# Patient Record
Sex: MALE | Race: ASIAN | Employment: FULL TIME | URBAN - METROPOLITAN AREA
[De-identification: names, ages, dates, MRNs, and addresses within clinical notes are randomized per-mention and may not be internally consistent; named-entity substitution may affect disease eponyms.]

---

## 2017-05-23 ENCOUNTER — OFFICE VISIT (OUTPATIENT)
Dept: HEMATOLOGY | Age: 67
End: 2017-05-23

## 2017-05-23 VITALS
WEIGHT: 127.4 LBS | TEMPERATURE: 97.5 F | DIASTOLIC BLOOD PRESSURE: 77 MMHG | BODY MASS INDEX: 24.07 KG/M2 | SYSTOLIC BLOOD PRESSURE: 128 MMHG | HEART RATE: 61 BPM

## 2017-05-23 DIAGNOSIS — K75.4 AUTOIMMUNE HEPATITIS (HCC): Primary | ICD-10-CM

## 2017-05-23 DIAGNOSIS — K86.1 AUTOIMMUNE SCLEROSING PANCREATITIS (HCC): ICD-10-CM

## 2017-05-23 NOTE — MR AVS SNAPSHOT
Visit Information Date & Time Provider Department Dept. Phone Encounter #  
 5/23/2017  8:30 AM Trena Regalado NP [facility] [address] [place] [address] [id number] Follow-up Instructions Return in about 6 months (around 11/23/2017) for FibroScan. Upcoming Health Maintenance Date Due Hepatitis C Screening 1950 DTaP/Tdap/Td series (1 - Tdap) 2/20/1972 FOBT Q 1 YEAR AGE 50-75 2/20/2001 ZOSTER VACCINE AGE 60> 2/20/2011 GLAUCOMA SCREENING Q2Y 2/20/2016 Pneumococcal 65+ Low/Medium Risk (1 of 2 - PCV13) 2/20/2016 MEDICARE YEARLY EXAM 2/20/2016 INFLUENZA AGE 9 TO ADULT 8/1/2017 Allergies as of 5/23/2017  Review Complete On: 5/23/2017 By: Trena Regalado NP No Known Allergies Current Immunizations  Never Reviewed No immunizations on file. Not reviewed this visit You Were Diagnosed With   
  
 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  
  
   
This list is accurate as of: 5/23/17  9:01 AM.  Always use your most recent med list.  
  
  
  
  
 tacrolimus 1 mg capsule Commonly known as:  PROGRAF Take 1 Cap by mouth every twelve (12) hours. 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 To-Do List   
 11/29/2017 10:00 AM  
  Appointment with Trena Escamilla NP at [facility] ([phone]) Patient Instructions FIBROSCAN PATIENT INFORMATION What is Fibroscan:? 
 
Fibroscan is an ultrasound device that measures liver stiffness by sending a pulse of vibrations through the liver. This translated into an immediate result that can help your healthcare team determine the level of damage to the liver as well as monitor the condition of various liver diseases over time. Fibroscan is helpful in the evaluation of the following conditions: 
 
Chronic Hepatitis C Chronic Hepatitis B Fatty Liver Disease Alcohol Liver Disease Chronic Cholestatic Liver Diseases What happens During the Scan? Patients receiving this exam lie flat on an examination table and raise the right arm above the head. The skin over the right lower rib cage is exposed and the examiner locates the correct area to be scanned. The prove of the scanner is placed directly on the patient and triggered to start. This fells like a gentle flick against the skin and should not be uncomfortable. At least ten (10) readings are taken and the average is calculated to score the amount of liver stiffness or scar tissue. The exam should take 10-20 minutes. What do I need to do to prepare for the scan? Please do not eat or drink anything 2-4 hours  Before your Fibroscan. You should continue taking any prescribed medication and can take small sips of water or clear fluid to do so,  But avoid drinking large amounts of fluid. Please dress comfortably in clothes that will allow for easy access to the right side of the abdomen. Women are discouraged from wearing a dress on the day of the exam. 
 
Are there any special precautions?  
 
Patients who are pregnant or have an implantable device (for example, pacemaker or defibrillator) should not have this exam performed. Patients with a significant amount of fat tissue in the area the probe is pressed may be unable to have test performed. Introducing hospitals & [facility] SERVICES! Dear Maximilian Caballero: Thank you for requesting a AisleFinder account. Our records indicate that you have previously registered for a AisleFinder account but its currently inactive. Please call our AisleFinder support line at [phone]. Additional Information If you have questions, please visit the Frequently Asked Questions section of the AisleFinder website at [email or web address] Remember, AisleFinder is NOT to be used for urgent needs. For medical emergencies, dial 911. Now available from your iPhone and Android! Please provide this summary of care documentation to your next provider. Your primary care clinician is listed as Jojo Huber. If you have any questions after today's visit, please call [phone].

## 2017-05-24 NOTE — PROGRESS NOTES
[address], MD, [facility]     April Sarita Payton, NP Selma Manges, PA-C Norlene Leyden, MD, [place], MD Ellen Moore NP Larene Mccallum, NP        [address] [facility], [address] [address]  22.     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 The active problem list, all pertinent past medical history, medications, endoscopic studies, radiologic findings and laboratory findings related to the liver disorder were reviewed with the patient. The patient also has autoimmune pancreatitis. Both autoimmune diseases were treated with prednisone and Imuran. However, whenever the prednisone dose is lowered he relapse of both the hepatitis and pancreatitis. He was started on tacrolimus in 1/2016. He was instructed to discontinue prednisone and Imuran. He is tolerating tacrolimus well. His liver enzymes and function have both remained normal with his current dose. MRI of the liver was performed in 11/2015. There was a stricture of the common bile duct. Dr. Mirna Goode treated this with a stent but it did not remain open so he placed a metal stent in the PD in 4/2016. The patient feels well today and has no new physical complaints. He continues to complain of increased gas. Probiotic improves his symptoms.  The patient has no limitations in functional activities which can be attributed to the liver disease. Today, patient denies abdominal pain, change in bowel habits, dark urine, myalgias, arthralgias, pruritus and problems concentrating. ALLERGIES:  No Known Allergies    MEDICATIONS  Current Outpatient Prescriptions   Medication Sig    tacrolimus (PROGRAF) 1 mg capsule Take 1 Cap by mouth every twelve (12) hours. 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 SYSTEM REVIEW NOT RELATED TO LIVER DISEASE OR REVIEWED ABOVE:  Constitution systems: Negative for fever, chills, weight gain, weight loss. Eyes: Negative for visual changes. ENT: Negative for sore throat, painful swallowing. Respiratory: Negative for cough, hemoptysis, SOB. Cardiology: Negative for chest pain, palpitations. GI:  Negative for constipation or diarrhea. : Negative for urinary frequency, dysuria, hematuria, nocturia. Skin: Negative for rash. Hematology: Negative for easy bruising, blood clots. Musculo-skelatal: Negative for back pain, muscle pain, weakness. Neurologic: Negative for headaches, dizziness, vertigo, memory problems not related to HE. Psychology: Negative for anxiety, depression. FAMILY HISTORY:  The father  of colon cancer. The mother has the following chronic diseases: has an autoimmune disease. There is no family history of liver disease. There is a family history of autoimmune diseases. SOCIAL HISTORY:  The patient is . The patient has 2 children. The patient has never used tobacco products. The patient has never consumed significant amounts of alcohol. The patient currently works full time in the [employer]. PHYSICAL EXAMINATION:  Visit Vitals    /77    Pulse 61    Temp 97.5 °F (36.4 °C) (Tympanic)    Wt 127 lb 6.4 oz (57.8 kg)    BMI 24.07 kg/m2     General: No acute distress. Eyes: Sclera anicteric. ENT: No oral lesions. Thyroid normal.  Nodes: No adenopathy. Skin: No spider angiomata. No jaundice. No palmar erythema. Respiratory: Lungs clear to auscultation. Cardiovascular: Regular heart rate. No murmurs. No JVD. Abdomen: Soft non-tender. Liver size normal to percussion/palpation. Spleen not palpable. No obvious ascites. Extremities: No edema. No muscle wasting. No gross arthritic changes. Neurologic: Alert and oriented. Cranial nerves grossly intact. No asterixsis. LABORATORY STUDIES:  5/2017: Local Labs  WBC/HGB/PLT: 6.3/12.4/265  AST/ALT/ALP/BILI/ALB: 19/14/87/0.3/4.5  BUN/CR: 23/0.91    [facility] Ref Rng & Units 11/29/2016 8/9/2016 4/29/2016   WBC 3.4 - 10.8 x10E3/uL  6.5 7.5   ANC x10E3/uL  3.6 4.1   HGB g/dL  11.8 14.1    - 379 x10E3/uL  249 268   INR 0.8 - 1.2      AST 0 - 40 IU/L 22 18 21   ALT 0 - 44 IU/L 24 15 18   Alk Phos 39 - 117 IU/L 99 100 106   Bili, Total 0.0 - 1.2 mg/dL 0.4 0.4 0.5   Bili, Direct 0.00 - 0.40 mg/dL      Albumin 3.6 - 4.8 g/dL 4.4 4.2 4.4   BUN 8 - 27 mg/dL 22 19 17   Creat 0.76 - 1.27 mg/dL 1.08 0.86 0.93   Na 136 - 144 mmol/L 139 142 139   K 3.5 - 5.2 mmol/L 5.0 4.9 4.9   Cl 97 - 106 mmol/L 100 105 100   CO2 18 - 29 mmol/L 24 24 23   Glucose 65 - 99 mg/dL 148 (H) 99 113 (H)     Liver Virology and Transplant Immune Suppression Tacrolimus Level   Latest Ref Rng 2.0 - 20.0 ng/mL   11/29/2016 8.3   8/9/2016 9.9   4/29/2016 7.0   3/25/2016 21.4 (HH)     SEROLOGIES:  10/2013. HBsAntigen negative, anti-HBcore positive, anti-HBsurface positive, HBE antigen negative, Anti-HBE positive,HBV DNA undetectable, Ferritin 797,     Serologies Latest Ref Rng 6/3/2014   APRIL, IFA  See patterns   APRIL Pattern  1:160 (H)   C-ANCA Neg:<1:20 titer <1:20   P-ANCA Neg:<1:20 titer <1:20   ANCA Neg:<1:20 titer <1:20   ASMCA 0 - 19 Units 25 (H)   M2 Ab 0.0 - 20.0 Units 3.8     LIVER HISTOLOGY:  2013. Reviewed by MLS. Moderate inflammation with stage 3 fibrosis. 8/2016. FibroScan performed at [facility]. EkPa was 7.4. Suggested fibrosis level is F1.    ENDOSCOPIC PROCEDURES:  2013. EGD by Dr Parminder Gibson. Gastritis and small gastric ulcer. 2013. EGD by Dr Parminder Gibson. Healed gastritis and .  2015. ERCP. Common bile duct stricture. Stent placed. 2016. ERCP by Dr Rosi Walden. Pancreatic stricture. Covered metal stent placed. RADIOLOGY:  10/2013. MRI of liver. Diffuse swelling of pancreas with peripancreatic edema. Encasement of CDB by edematous pancreas head. Normal appearing liver. No liver mass lesions. Normal spleen. No dilated bile ducts. No bile duct strictures. No ascites. 2014. MRI of liver. Short yet severe stricture of the common duct within the pancreatic head without associated pancreatic ductal or intrahepatic biliary dilatation. There is questionable subtle decreased contrast enhancement within the pancreatic head. ERCP with endoscopic ultrasound suggested for further evaluation. 2015. MRCP. Obstruction of CBD in region of pennie hepatitis. Distal CBD is normal. Proximal to obstruction the intrahepatic bile ducts are dilated. OTHER TESTIN2013.  17. ASSESSMENT AND PLAN:  Autoimmune hepatitis with bridging fibrosis in . FibroScan in 2016 demonstrated improvement in his liver disease after treatment with a fibrosis score of stage 1 out of 4. He will continue his current regimen. This has been effective and is well tolerated. Discussed with patient today the importance of continuing this regimen long-term. He has had no abdominal pain and exacerbation of autoimmune pancreatitis since starting tacrolimus in 2016. Will obtain blood work a few days prior to the next appointment in 6 months. This will include tacrolimus blood level (patient instructed to hold tacrolimus until after lab work). This was given to patient today. The patient was directed to continue all current medications at the current dosages.  There are no contraindications for the patient to take any medications that are necessary for treatment of other medical issues. All of the above issues were discussed with the patient. All questions were answered. The patient expressed a clear understanding of the above. [facility] [place] [address] in 6 months with FibroScan.     Veleta Eisenmenger, NP  Liver [place] [address]  Ph: [phone]  Fax: [phone]  Email: [email or web address]

## 2017-10-11 ENCOUNTER — HOSPITAL ENCOUNTER (OUTPATIENT)
Age: 67
Setting detail: OUTPATIENT SURGERY
Discharge: HOME | End: 2017-10-11
Attending: INTERNAL MEDICINE | Admitting: INTERNAL MEDICINE
Payer: COMMERCIAL

## 2017-10-11 ENCOUNTER — ANESTHESIA EVENT (OUTPATIENT)
Dept: ENDOSCOPY | Age: 67
End: 2017-10-11
Payer: COMMERCIAL

## 2017-10-11 ENCOUNTER — ANESTHESIA (OUTPATIENT)
Dept: ENDOSCOPY | Age: 67
End: 2017-10-11
Payer: COMMERCIAL

## 2017-10-11 ENCOUNTER — APPOINTMENT (OUTPATIENT)
Dept: GENERAL RADIOLOGY | Age: 67
End: 2017-10-11
Attending: INTERNAL MEDICINE
Payer: COMMERCIAL

## 2017-10-11 VITALS
SYSTOLIC BLOOD PRESSURE: 113 MMHG | DIASTOLIC BLOOD PRESSURE: 81 MMHG | HEART RATE: 59 BPM | WEIGHT: 126.5 LBS | RESPIRATION RATE: 18 BRPM | OXYGEN SATURATION: 100 % | TEMPERATURE: 97.7 F | BODY MASS INDEX: 23.88 KG/M2 | HEIGHT: 61 IN

## 2017-10-11 LAB — CEA SERPL-MCNC: 0.5 NG/ML

## 2017-10-11 PROCEDURE — 76040000007: Performed by: INTERNAL MEDICINE

## 2017-10-11 PROCEDURE — 77030007288 HC DEV LOK BILI BSC -A: Performed by: INTERNAL MEDICINE

## 2017-10-11 PROCEDURE — 77030009038 HC CATH BILI STN RTVR BSC -C: Performed by: INTERNAL MEDICINE

## 2017-10-11 PROCEDURE — 77030012596 HC SPHNTOM BILI BSC -E: Performed by: INTERNAL MEDICINE

## 2017-10-11 PROCEDURE — 88305 TISSUE EXAM BY PATHOLOGIST: CPT | Performed by: INTERNAL MEDICINE

## 2017-10-11 PROCEDURE — 88112 CYTOPATH CELL ENHANCE TECH: CPT | Performed by: INTERNAL MEDICINE

## 2017-10-11 PROCEDURE — 86301 IMMUNOASSAY TUMOR CA 19-9: CPT | Performed by: INTERNAL MEDICINE

## 2017-10-11 PROCEDURE — C2625 STENT, NON-COR, TEM W/DEL SY: HCPCS | Performed by: INTERNAL MEDICINE

## 2017-10-11 PROCEDURE — 82378 CARCINOEMBRYONIC ANTIGEN: CPT | Performed by: INTERNAL MEDICINE

## 2017-10-11 PROCEDURE — 74011250636 HC RX REV CODE- 250/636

## 2017-10-11 PROCEDURE — 36415 COLL VENOUS BLD VENIPUNCTURE: CPT | Performed by: INTERNAL MEDICINE

## 2017-10-11 PROCEDURE — 77030009426 HC FCPS BIOP ENDOSC BSC -B: Performed by: INTERNAL MEDICINE

## 2017-10-11 PROCEDURE — 77030036933 HC BRSH RX CYTO WREGD BSC -C: Performed by: INTERNAL MEDICINE

## 2017-10-11 PROCEDURE — 74011000250 HC RX REV CODE- 250

## 2017-10-11 PROCEDURE — 76060000032 HC ANESTHESIA 0.5 TO 1 HR: Performed by: INTERNAL MEDICINE

## 2017-10-11 DEVICE — BILIARY STENT WITH NAVIFLEXTM RX DELIVERY SYSTEM
Type: IMPLANTABLE DEVICE | Site: BILE DUCT | Status: FUNCTIONAL
Brand: ADVANIX™ BILIARY

## 2017-10-11 RX ORDER — SODIUM CHLORIDE 0.9 % (FLUSH) 0.9 %
5-10 SYRINGE (ML) INJECTION AS NEEDED
Status: DISCONTINUED | OUTPATIENT
Start: 2017-10-11 | End: 2017-10-11 | Stop reason: HOSPADM

## 2017-10-11 RX ORDER — LIDOCAINE HYDROCHLORIDE 20 MG/ML
INJECTION, SOLUTION EPIDURAL; INFILTRATION; INTRACAUDAL; PERINEURAL AS NEEDED
Status: DISCONTINUED | OUTPATIENT
Start: 2017-10-11 | End: 2017-10-11 | Stop reason: HOSPADM

## 2017-10-11 RX ORDER — FLUMAZENIL 0.1 MG/ML
0.2 INJECTION INTRAVENOUS
Status: DISCONTINUED | OUTPATIENT
Start: 2017-10-11 | End: 2017-10-11 | Stop reason: HOSPADM

## 2017-10-11 RX ORDER — SODIUM CHLORIDE 9 MG/ML
50 INJECTION, SOLUTION INTRAVENOUS CONTINUOUS
Status: DISCONTINUED | OUTPATIENT
Start: 2017-10-11 | End: 2017-10-11 | Stop reason: HOSPADM

## 2017-10-11 RX ORDER — DEXTROMETHORPHAN/PSEUDOEPHED 2.5-7.5/.8
1.2 DROPS ORAL
Status: DISCONTINUED | OUTPATIENT
Start: 2017-10-11 | End: 2017-10-11 | Stop reason: HOSPADM

## 2017-10-11 RX ORDER — ATROPINE SULFATE 0.1 MG/ML
0.5 INJECTION INTRAVENOUS
Status: DISCONTINUED | OUTPATIENT
Start: 2017-10-11 | End: 2017-10-11 | Stop reason: HOSPADM

## 2017-10-11 RX ORDER — SODIUM CHLORIDE 0.9 % (FLUSH) 0.9 %
5-10 SYRINGE (ML) INJECTION EVERY 8 HOURS
Status: DISCONTINUED | OUTPATIENT
Start: 2017-10-11 | End: 2017-10-11 | Stop reason: HOSPADM

## 2017-10-11 RX ORDER — EPINEPHRINE 0.1 MG/ML
1 INJECTION INTRACARDIAC; INTRAVENOUS
Status: DISCONTINUED | OUTPATIENT
Start: 2017-10-11 | End: 2017-10-11 | Stop reason: HOSPADM

## 2017-10-11 RX ORDER — NALOXONE HYDROCHLORIDE 0.4 MG/ML
0.4 INJECTION, SOLUTION INTRAMUSCULAR; INTRAVENOUS; SUBCUTANEOUS
Status: DISCONTINUED | OUTPATIENT
Start: 2017-10-11 | End: 2017-10-11 | Stop reason: HOSPADM

## 2017-10-11 RX ORDER — MIDAZOLAM HYDROCHLORIDE 1 MG/ML
.25-1 INJECTION, SOLUTION INTRAMUSCULAR; INTRAVENOUS
Status: DISCONTINUED | OUTPATIENT
Start: 2017-10-11 | End: 2017-10-11 | Stop reason: HOSPADM

## 2017-10-11 RX ORDER — PROPOFOL 10 MG/ML
INJECTION, EMULSION INTRAVENOUS AS NEEDED
Status: DISCONTINUED | OUTPATIENT
Start: 2017-10-11 | End: 2017-10-11 | Stop reason: HOSPADM

## 2017-10-11 RX ORDER — SODIUM CHLORIDE 9 MG/ML
INJECTION, SOLUTION INTRAVENOUS
Status: DISCONTINUED | OUTPATIENT
Start: 2017-10-11 | End: 2017-10-11 | Stop reason: HOSPADM

## 2017-10-11 RX ORDER — FENTANYL CITRATE 50 UG/ML
100 INJECTION, SOLUTION INTRAMUSCULAR; INTRAVENOUS
Status: DISCONTINUED | OUTPATIENT
Start: 2017-10-11 | End: 2017-10-11 | Stop reason: HOSPADM

## 2017-10-11 RX ADMIN — PROPOFOL 25 MG: 10 INJECTION, EMULSION INTRAVENOUS at 12:56

## 2017-10-11 RX ADMIN — PROPOFOL 25 MG: 10 INJECTION, EMULSION INTRAVENOUS at 13:00

## 2017-10-11 RX ADMIN — PROPOFOL 25 MG: 10 INJECTION, EMULSION INTRAVENOUS at 13:14

## 2017-10-11 RX ADMIN — LIDOCAINE HYDROCHLORIDE 60 MG: 20 INJECTION, SOLUTION EPIDURAL; INFILTRATION; INTRACAUDAL; PERINEURAL at 12:51

## 2017-10-11 RX ADMIN — PROPOFOL 25 MG: 10 INJECTION, EMULSION INTRAVENOUS at 13:04

## 2017-10-11 RX ADMIN — PROPOFOL 25 MG: 10 INJECTION, EMULSION INTRAVENOUS at 13:08

## 2017-10-11 RX ADMIN — PROPOFOL 25 MG: 10 INJECTION, EMULSION INTRAVENOUS at 13:11

## 2017-10-11 RX ADMIN — PROPOFOL 50 MG: 10 INJECTION, EMULSION INTRAVENOUS at 12:51

## 2017-10-11 RX ADMIN — PROPOFOL 25 MG: 10 INJECTION, EMULSION INTRAVENOUS at 13:02

## 2017-10-11 RX ADMIN — PROPOFOL 25 MG: 10 INJECTION, EMULSION INTRAVENOUS at 12:54

## 2017-10-11 RX ADMIN — PROPOFOL 25 MG: 10 INJECTION, EMULSION INTRAVENOUS at 13:16

## 2017-10-11 RX ADMIN — PROPOFOL 25 MG: 10 INJECTION, EMULSION INTRAVENOUS at 13:09

## 2017-10-11 RX ADMIN — PROPOFOL 25 MG: 10 INJECTION, EMULSION INTRAVENOUS at 13:05

## 2017-10-11 RX ADMIN — PROPOFOL 25 MG: 10 INJECTION, EMULSION INTRAVENOUS at 13:07

## 2017-10-11 RX ADMIN — PROPOFOL 25 MG: 10 INJECTION, EMULSION INTRAVENOUS at 12:58

## 2017-10-11 RX ADMIN — PROPOFOL 25 MG: 10 INJECTION, EMULSION INTRAVENOUS at 12:52

## 2017-10-11 RX ADMIN — SODIUM CHLORIDE: 9 INJECTION, SOLUTION INTRAVENOUS at 12:40

## 2017-10-11 NOTE — ROUTINE PROCESS
Kranthi Johnson  1950  [id number]    Situation:  Verbal report received from: Yvonne Cisneros RN  Procedure: Procedure(s):  ENDOSCOPIC RETROGRADE CHOLANGIOPANCREATOGRAPHY  ENDOSCOPIC STONE EXTRACTION/BALLOON SWEEP  ESOPHAGOGASTRODUODENAL (EGD) BIOPSY  ENDOSCOPIC BRUSHING    Background:    Preoperative diagnosis: AUTOIMMUNE CHRONIC PANCREATITIS, COMMOM BILE DUCT CALCULUS, BILE DUCT STRICTURE  Postoperative diagnosis: 1. Bile Duct Stricture  2. Bile Duct Stone    :  Dr. Macie Souza  Assistant(s): Endoscopy Technician-1: Maldonado Rae  Endoscopy RN-1: Milton Ortega RN    Specimens: * No specimens in log *  H. Pylori  no    Assessment:  Intra-procedure medications   Anesthesia gave intra-procedure sedation and medications, see anesthesia flow sheet yes    Intravenous fluids: NS@ KVO     Vital signs stable     Abdominal assessment: round and soft     Recommendation:  Discharge patient per MD order.   Son  Permission to share finding with family or friend yes

## 2017-10-11 NOTE — H&P
118 [facility].  217 [facility] 140 [facility], [address]  [phone]                                History and Physical     NAME: Sera Hooper   :  1950   MRN:  [id number]     HPI:  The patient was seen and examined. Past Surgical History:   Procedure Laterality Date    ABDOMEN SURGERY PROC UNLISTED      hernia repair    HX OTHER SURGICAL      Surgury on testicles     Past Medical History:   Diagnosis Date    Liver disease     cholangitis     Social History   Substance Use Topics    Smoking status: Never Smoker    Smokeless tobacco: Never Used    Alcohol use No     No Known Allergies  Family History   Problem Relation Age of Onset    Cancer Mother     Cancer Sister     Cancer Brother      No current facility-administered medications for this encounter. PHYSICAL EXAM:  General: WD, WN. Alert, cooperative, no acute distress    HEENT: NC, Atraumatic. PERRLA, EOMI. Anicteric sclerae. Lungs:  CTA Bilaterally. No Wheezing/Rhonchi/Rales. Heart:  Regular  rhythm,  No murmur, No Rubs, No Gallops  Abdomen: Soft, Non distended, Non tender.  +Bowel sounds, no HSM  Extremities: No c/c/e  Neurologic:  CN 2-12 gi, Alert and oriented X 3. No acute neurological distress   Psych:   Good insight. Not anxious nor agitated. The heart, lungs and mental status were satisfactory for the administration of GA and for the procedure.       Mallampati score: 2       Assessment:   · Bile duct stricture    Plan:   · Endoscopic procedure  · GA

## 2017-10-11 NOTE — PROGRESS NOTES
Discharge instructions given verbally and in writing to patients son who gave a verbal understanding. Unable to sign the note because the signature pad is not working.

## 2017-10-11 NOTE — DISCHARGE INSTRUCTIONS
118 [facility].  217 [facility] [address] [facility], [address]  [phone]                     DISCHARGE INSTRUCTIONS    Manny Reynoso  [id number]  1950    DISCOMFORT:  Sore throat- throat lozenges or warm salt water gargle  redness at IV site- apply warm compress to area; if redness or soreness persist- contact your physician  Gaseous discomfort- walking, belching will help relieve any discomfort  You may not operate a vehicle for 12 hours  You may not engage in an occupation involving machinery or appliances for rest of today  You may not drink alcoholic beverages for at least 12 hours  Avoid making any critical decisions for at least 24 hour  DIET  You may eat and drink after you leave. You may resume your regular diet - however -  remember your colon is empty and a heavy meal will produce gas. Avoid these foods:  vegetables, fried / greasy foods, carbonated drinks    ACTIVITY  You may resume your normal daily activities   Spend the remainder of the day resting -  avoid any strenuous activity. CALL M.D. ANY SIGN OF   Increasing pain, nausea, vomiting  Abdominal distension (swelling)  New increased bleeding (oral or rectal)  Fever (chills)  Pain in chest area  Bloody discharge from nose or mouth  Shortness of breath    Follow-up Instructions:   Call Dr. Maureen Gardner for any questions or problems. If we took a biopsy please call the office within 2 weeks to discuss your                             pathology results. Telephone # [phone]        Continue same medications.

## 2017-10-11 NOTE — PROGRESS NOTES

## 2017-10-11 NOTE — PROGRESS NOTES
[address] October 11, 2017       RE: Gordon Hollins      To Whom It May Concern,    This is to certify that Gordon Hollins may return to work on 10/17/2017. Please excuse Mr. Guillermo Short from work on 10/11/17-10/16/17 due to medical reasons. Please feel free to contact my office if you have any questions or concerns. Thank you for your assistance in this matter.       Sincerely,  Dr. Pam Harvey   [phone]

## 2017-10-11 NOTE — IP AVS SNAPSHOT
[address] 
[phone] Patient: Farrukh Keller MRN: [id number] SDC:8/96/6690 You are allergic to the following No active allergies Recent Documentation Height Weight BMI Smoking Status 1.549 m 57.4 kg 23.9 kg/m2 Never Smoker Emergency Contacts Name Discharge Info Relation Home Work Mobile Giorgio Trevizo DISCHARGE CAREGIVER [3] Child [2] [phone] Melissa Mathis CAREGIVER [3] Spouse [3] [phone] About your hospitalization You were admitted on:  October 11, 2017 You last received care in the:  [facility] ENDOSCOPY You were discharged on:  October 11, 2017 Unit phone number:  [phone] Why you were hospitalized Your primary diagnosis was:  Not on File Providers Seen During Your Hospitalizations Provider Role Specialty Primary office phone Octavio Candelario MD Attending Provider Gastroenterology [phone] Your Primary Care Physician (PCP) Primary Care Physician Office Phone Office Fax Awilda Dwyer [phone] Follow-up Information None Current Discharge Medication List  
  
ASK your doctor about these medications Dose & Instructions Dispensing Information Comments Morning Noon Evening Bedtime  
 tacrolimus 1 mg capsule Commonly known as:  PROGRAF Your last dose was: Your next dose is:    
   
   
 Dose:  1 mg Take 1 Cap by mouth every twelve (12) hours. 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 
[address]  
[phone] DISCHARGE INSTRUCTIONS Farrukh Keller 
[id number] 
1950 DISCOMFORT: 
 Sore throat- throat lozenges or warm salt water gargle 
redness at IV site- apply warm compress to area; if redness or soreness persist- contact your physician Gaseous discomfort- walking, belching will help relieve any discomfort You may not operate a vehicle for 12 hours You may not engage in an occupation involving machinery or appliances for rest of today You may not drink alcoholic beverages for at least 12 hours Avoid making any critical decisions for at least 24 hour DIET You may eat and drink after you leave. You may resume your regular diet  however -  remember your colon is empty and a heavy meal will produce gas. Avoid these foods:  vegetables, fried / greasy foods, carbonated drinks ACTIVITY You may resume your normal daily activities Spend the remainder of the day resting -  avoid any strenuous activity. CALL M.D. ANY SIGN OF Increasing pain, nausea, vomiting Abdominal distension (swelling) New increased bleeding (oral or rectal) Fever (chills) Pain in chest area Bloody discharge from nose or mouth Shortness of breath Follow-up Instructions: 
 Call Dr. Sho Moreno for any questions or problems. If we took a biopsy please call the office within 2 weeks to discuss your                             pathology results. Telephone # [phone] Continue same medications. Discharge Orders None Introducing [facility] & HEALTH SERVICES! Dear Tressa Obregon: Thank you for requesting a Fangdd account. Our records indicate that you have previously registered for a Fangdd account but its currently inactive. Please call our Fangdd support line at [phone]. Additional Information If you have questions, please visit the Frequently Asked Questions section of the Fangdd website at [email or web address] Remember, Fangdd is NOT to be used for urgent needs. For medical emergencies, dial 911. Now available from your iPhone and Android! General Information Please provide this summary of care documentation to your next provider. Patient Signature:  ____________________________________________________________ Date:  ____________________________________________________________  
  
Marvetta Land Provider Signature:  ____________________________________________________________ Date:  ____________________________________________________________

## 2017-10-11 NOTE — ANESTHESIA POSTPROCEDURE EVALUATION
Post-Anesthesia Evaluation and Assessment    Patient: Farrukh Keller MRN: [id number]  SSN: [id number]    YOB: 1950  Age: 78 y.o. Sex: male       Cardiovascular Function/Vital Signs  Visit Vitals    /65    Pulse 70    Temp 36.8 °C (98.3 °F)    Resp 12    Ht 5' 1\" (1.549 m)    Wt 57.4 kg (126 lb 8 oz)    SpO2 98%    BMI 23.9 kg/m2       Patient is status post MAC anesthesia for Procedure(s):  ENDOSCOPIC RETROGRADE CHOLANGIOPANCREATOGRAPHY  ENDOSCOPIC STONE EXTRACTION/BALLOON SWEEP  ESOPHAGOGASTRODUODENAL (EGD) BIOPSY  ENDOSCOPIC BRUSHING. Nausea/Vomiting: None    Postoperative hydration reviewed and adequate. Pain:  Pain Scale 1: Numeric (0 - 10) (10/11/17 1129)  Pain Intensity 1: 3 (10/11/17 1103)   Managed    Neurological Status: At baseline    Mental Status and Level of Consciousness: Arousable    Pulmonary Status:   O2 Device: Nasal cannula (10/11/17 1320)   Adequate oxygenation and airway patent    Complications related to anesthesia: None    Post-anesthesia assessment completed.  No concerns    Signed By: Viviane Gitelman, MD     October 11, 2017

## 2017-10-11 NOTE — ANESTHESIA PREPROCEDURE EVALUATION
Anesthetic History   No history of anesthetic complications            Review of Systems / Medical History  Patient summary reviewed, nursing notes reviewed and pertinent labs reviewed    Pulmonary  Within defined limits                 Neuro/Psych   Within defined limits           Cardiovascular                  Exercise tolerance: >4 METS     GI/Hepatic/Renal           Liver disease    Comments: Common bile duct obstruction Endo/Other             Other Findings   Comments: Pt has an oral ulcer on right of hard palate         Physical Exam    Airway  Mallampati: I  TM Distance: > 6 cm  Neck ROM: normal range of motion   Mouth opening: Normal     Cardiovascular    Rhythm: regular  Rate: normal         Dental  No notable dental hx       Pulmonary  Breath sounds clear to auscultation               Abdominal         Other Findings            Anesthetic Plan    ASA: 2  Anesthesia type: MAC          Induction: Intravenous  Anesthetic plan and risks discussed with: Patient

## 2017-10-11 NOTE — PROCEDURES
118 [facility].  217 [facility] [address], [address]  [phone]                   ERCP NOTE    NAME:  Dana Holley   :   1950   MRN:   [id number]     Date/Time:  10/11/2017 1:32 PM    Procedure Type:   ERCPwith biliary stone removal, biliary stent placement, biliary tissue sampling     Indications: abnormal CT/MRCP  Pre-operative Diagnosis: see indication above  Post-operative Diagnosis:  See findings below  : Isrrael Soto MD    Referring Provider:     Rubi Arshad MD    Sedation:  MAC anesthesia    Procedure Details:  After informed consent was obtained with all risks and benefits of procedure explained, the patient was taken to the fluoroscopy suite and placed in the prone position. Upon sequential sedation as per above, the Olympus duodenoscope [id number]   was inserted via the mouthpeice and carefully advanced to the second portion of the duodenum. The quality of visualization was good. The duodenoscope was withdrawn into a short position. Findings:   Esophagus: not examined in detail  Stomach: not examined in detail  Duodenum/jejunum: not examined in detail  Ampulla:-normal   previous sphincterotomy  Cholangiogram: -Bile duct was cannulated using Dreamwire. Contrast was injected. A stricture was seen in the distal common bile duct immediatelyu proximal to the ampulla. This was 1cm in length. Shoulder effect was seen. An oblong filing defect was seen in the mid common bile duct. Biliary brushings and biopsy were performed. Balloon sweep with an extraction balloon 9-12 mm injected below yielded sludge and an oblong 15 mm X 6 mm stone. No stone remained. A 1-0 Fr X 7 cm plastic biliary stent (Advanix) was placed. Bile was draining well and stent was in good position. Pancreatogram:not performed    Specimen Removed: * No specimens in log *    Complications: None. EBL:  None.     Interventions:    Pancreatic: none  Biliary: Bile duct was cannulated using Dreamwire. Contrast was injected. A stricture was seen in the distal common bile duct immediatelyu proximal to the ampulla. This was 1cm in length. Shoulder effect was seen. An oblong filing defect was seen in the mid common bile duct. Biliary brushings and biopsy were performed. Balloon sweep with an extraction balloon 9-12 mm injected below yielded sludge and an oblong 15 mm X 6 mm stone. No stone remained. A 1-0 Fr X 7 cm plastic biliary stent (AdvaniDubizzle) was placed. Bile was draining well and stent was in good position.      Impression:  -Bile duct stricture and stented  -Bile duct stone- extracted    Recommendations:   -Await cytology and histology results  -CEA and CA 19-9 levels  -Consider spyglass for biliary tissue sampling based upon the results  -ERCP with stent change in 3 months unless a spyglass procedure is performed earlier based upon the biopsy results      Discharge Disposition:  Home following recovery in [facility] Aster Kovacs MD  10/11/2017  1:32 PM

## 2017-10-13 LAB — CANCER AG19-9 SERPL-ACNC: 8 U/ML (ref 0–35)

## 2017-11-29 ENCOUNTER — OFFICE VISIT (OUTPATIENT)
Dept: HEMATOLOGY | Age: 67
End: 2017-11-29

## 2017-11-29 VITALS
WEIGHT: 126.4 LBS | BODY MASS INDEX: 23.86 KG/M2 | DIASTOLIC BLOOD PRESSURE: 66 MMHG | HEIGHT: 61 IN | HEART RATE: 65 BPM | TEMPERATURE: 96.1 F | OXYGEN SATURATION: 99 % | SYSTOLIC BLOOD PRESSURE: 109 MMHG

## 2017-11-29 DIAGNOSIS — K86.1 AUTOIMMUNE SCLEROSING PANCREATITIS (HCC): ICD-10-CM

## 2017-11-29 DIAGNOSIS — K75.4 AUTOIMMUNE HEPATITIS (HCC): Primary | ICD-10-CM

## 2017-11-29 RX ORDER — TACROLIMUS 1 MG/1
1 CAPSULE ORAL EVERY 12 HOURS
Qty: 180 CAP | Refills: 3 | Status: SHIPPED | OUTPATIENT
Start: 2017-11-29 | End: 2019-01-23 | Stop reason: SDUPTHER

## 2017-11-29 NOTE — MR AVS SNAPSHOT
Visit Information Date & Time Provider Department Dept. Phone Encounter #  
 11/29/2017 10:00 AM April G Bunny Castillo, [facility] 219 [id number] Upcoming Health Maintenance Date Due Hepatitis C Screening 1950 DTaP/Tdap/Td series (1 - Tdap) 2/20/1972 FOBT Q 1 YEAR AGE 50-75 2/20/2001 ZOSTER VACCINE AGE 60> 12/20/2010 GLAUCOMA SCREENING Q2Y 2/20/2016 Pneumococcal 65+ Low/Medium Risk (1 of 2 - PCV13) 2/20/2016 MEDICARE YEARLY EXAM 2/20/2016 Influenza Age 5 to Adult 8/1/2017 Allergies as of 11/29/2017  Review Complete On: 11/29/2017 By: Jose Coe LPN No Known Allergies Current Immunizations  Never Reviewed No immunizations on file. Not reviewed this visit Vitals BP Pulse Temp Height(growth percentile) 109/66 (BP 1 Location: Left arm, BP Patient Position: Sitting) 65 96.1 °F (35.6 °C) (Tympanic) 5' 1\" (1.549 m) Weight(growth percentile) SpO2 BMI Smoking Status 126 lb 6.4 oz (57.3 kg) 99% 23.88 kg/m2 Never Smoker BMI and BSA Data Body Mass Index Body Surface Area  
 23.88 kg/m 2 1.57 m 2 Preferred Pharmacy Pharmacy Name Phone Min Alejandro [address] [facility], [address] [place] [address] [phone] Your Updated Medication List  
  
   
This list is accurate as of: 11/29/17 10:06 AM.  Always use your most recent med list.  
  
  
  
  
 CALCIUM 600 + D 600-125 mg-unit Tab Generic drug:  calcium-cholecalciferol (d3) Take  by mouth. FISH  mg Cap Generic drug:  Omega-3 Fatty Acids Take  by mouth. [address] Take  by mouth. tacrolimus 1 mg capsule Commonly known as:  PROGRAF Take 1 Cap by mouth every twelve (12) hours. 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  
 tacrolimus (PROGRAF) 1 mg capsule 3  
 Sig: Take 1 Cap by mouth every twelve (12) hours. 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  
 Pharmacy: [facility] [address]  [address] [place] [address] Cruz Camara Ph #: [phone] Route: Oral  
  
Introducing [facility] & HEALTH SERVICES! Dear Sandra Watson: Thank you for requesting a Songkick account. Our records indicate that you have previously registered for a Songkick account but its currently inactive. Please call our Songkick support line at [phone]. Additional Information If you have questions, please visit the Frequently Asked Questions section of the Songkick website at [email or web address] Remember, Songkick is NOT to be used for urgent needs. For medical emergencies, dial 911. Now available from your iPhone and Android! Please provide this summary of care documentation to your next provider. Your primary care clinician is listed as Terese Carias. If you have any questions after today's visit, please call [phone].

## 2017-11-29 NOTE — PROGRESS NOTES
[address] Marybel Kirkpatrick MD, Marcelo Wesley, [facility] [place], [place]       Sanya Hunt, MATTY Sood, [facility]   RONI Zendejas NP        at [address], [address] [address]     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 [address] K75.4        Ignacio Cheek [facility] for management of autoimmune hepatitis. The active problem list, all pertinent past medical history, medications, endoscopic studies, radiologic findings and laboratory findings related to the liver disorder were reviewed with the patient. The patient also has autoimmune pancreatitis. Both autoimmune diseases were treated with prednisone and Imuran. However, whenever the prednisone dose is lowered, both the hepatitis and pancreatitis flare. 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 Patient presents to the clinic today for a Fibroscan to reassess liver fibrosis or scarring. Fibroscan in August 2016 demonstrated portal fibrosis after successful treatment of AIH. He was started on tacrolimus in 1/2016. He was instructed to discontinue prednisone and Imuran. He is tolerating tacrolimus well. His liver enzymes and function have both remained normal with his current dose. MRI of the liver was performed in 11/2015. There was a stricture of the common bile duct. Dr. Jeannette Grullon treated this with a stent but it did not remain open so he placed a metal stent in the PD in 4/2016.  In 2017, Dr. Kobe Flores went back in and place another stent and removed a bile duct stone with no complications. The patient feels well today and has no new physical complaints. He continues to complain of increased gas. Probiotic improves his symptoms. The patient has no limitations in functional activities which can be attributed to the liver disease. Today, patient denies abdominal pain, change in bowel habits, dark urine, myalgias, arthralgias, pruritus and problems concentrating. ALLERGIES:  No Known Allergies    MEDICATIONS  Current Outpatient Prescriptions   Medication Sig    calcium-cholecalciferol, d3, (CALCIUM 600 + D) 600-125 mg-unit tab Take  by mouth.  Omega-3 Fatty Acids (FISH OIL) 300 mg cap Take  by mouth.  GLUCOSAM/CHOND/HYALU/CF BORATE (MOVE FREE MatchMate.Me PO) Take  by mouth.  tacrolimus (PROGRAF) 1 mg capsule Take 1 Cap by mouth every twelve (12) hours. 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 SYSTEM REVIEW NOT RELATED TO LIVER DISEASE OR REVIEWED ABOVE:  Constitution systems: Negative for fever, chills, weight gain, weight loss. Eyes: Negative for visual changes. ENT: Negative for sore throat, painful swallowing. Respiratory: Negative for cough, hemoptysis, SOB. Cardiology: Negative for chest pain, palpitations. GI:  Negative for constipation or diarrhea. : Negative for urinary frequency, dysuria, hematuria, nocturia. Skin: Negative for rash. Hematology: Negative for easy bruising, blood clots. Musculo-skelatal: Negative for back pain, muscle pain, weakness. Neurologic: Negative for headaches, dizziness, vertigo, memory problems not related to HE. Psychology: Negative for anxiety, depression. FAMILY HISTORY:  The father  of colon cancer. The mother has the following chronic diseases: has an autoimmune disease. There is no family history of liver disease. There is a family history of autoimmune diseases. SOCIAL HISTORY:  The patient is . The patient has 2 children. The patient has never used tobacco products. The patient has never consumed significant amounts of alcohol. The patient currently works full time in the [employer]. PHYSICAL EXAMINATION:  Visit Vitals    /66 (BP 1 Location: Left arm, BP Patient Position: Sitting)    Pulse 65    Temp 96.1 °F (35.6 °C) (Tympanic)    Ht 5' 1\" (1.549 m)    Wt 126 lb 6.4 oz (57.3 kg)    SpO2 99%    BMI 23.88 kg/m2     General: No acute distress. Eyes: Sclera anicteric. ENT: No oral lesions. Thyroid normal.  Nodes: No adenopathy. Skin: No spider angiomata. No jaundice. No palmar erythema. Respiratory: Lungs clear to auscultation. Cardiovascular: Regular heart rate. No murmurs. No JVD. Abdomen: Soft non-tender. Liver size normal to percussion/palpation. Spleen not palpable. No obvious ascites. Extremities: No edema. No muscle wasting. No gross arthritic changes. Neurologic: Alert and oriented. Cranial nerves grossly intact. No asterixsis. LABORATORY STUDIES:  11/2017: Local Labs-results have been requested.   WBC/HGB/PLT:   AST/ALT/ALP/BILI/ALB:   BUN/CR:    5/2017: Local Labs  WBC/HGB/PLT: 6.3/12.4/265  AST/ALT/ALP/BILI/ALB: 19/14/87/0.3/4.5  BUN/CR: 23/0.91    Liver [facility] Latest Ref Rng & Units 11/29/2016 8/9/2016 4/29/2016   WBC 3.4 - 10.8 x10E3/uL  6.5 7.5   ANC x10E3/uL  3.6 4.1   HGB g/dL  11.8 14.1    - 379 x10E3/uL  249 268   INR 0.8 - 1.2      AST 0 - 40 IU/L 22 18 21   ALT 0 - 44 IU/L 24 15 18   Alk Phos 39 - 117 IU/L 99 100 106   Bili, Total 0.0 - 1.2 mg/dL 0.4 0.4 0.5   Bili, Direct 0.00 - 0.40 mg/dL      Albumin 3.6 - 4.8 g/dL 4.4 4.2 4.4   BUN 8 - 27 mg/dL 22 19 17   Creat 0.76 - 1.27 mg/dL 1.08 0.86 0.93   Na 136 - 144 mmol/L 139 142 139   K 3.5 - 5.2 mmol/L 5.0 4.9 4.9   Cl 97 - 106 mmol/L 100 105 100   CO2 18 - 29 mmol/L 24 24 23   Glucose 65 - 99 mg/dL 148 (H) 99 113 (H)     Liver Virology and Transplant Immune Suppression Tacrolimus Level   Latest Ref Rng 2.0 - 20.0 ng/mL   11/29/2016 8.3   8/9/2016 9.9   4/29/2016 7.0   3/25/2016 21.4 (HH)     SEROLOGIES:  10/2013. HBsAntigen negative, anti-HBcore positive, anti-HBsurface positive, HBE antigen negative, Anti-HBE positive,HBV DNA undetectable, Ferritin 797,     Serologies Latest Ref Rng 6/3/2014   APRIL, IFA  See patterns   APRIL Pattern  1:160 (H)   C-ANCA Neg:<1:20 titer <1:20   P-ANCA Neg:<1:20 titer <1:20   ANCA Neg:<1:20 titer <1:20   ASMCA 0 - 19 Units 25 (H)   M2 Ab 0.0 - 20.0 Units 3.8     LIVER HISTOLOGY:  2013. Reviewed by MLS. Moderate inflammation with stage 3 fibrosis. 8/2016. [facility]. EkPa was 7.4. Suggested fibrosis level is F1.  11/2017. FibroScan performed at [facility]. EkPa was 8.1. Suggested fibrosis level is F1.    ENDOSCOPIC PROCEDURES:  5/2013. EGD by Dr Juan Chandler. Gastritis and small gastric ulcer. 7/2013. EGD by Dr Juan Chandler. Healed gastritis and .  12/2015. ERCP. Common bile duct stricture. Stent placed. 4/2016. ERCP by Dr Daryl Dumont. Pancreatic stricture. Covered metal stent placed. RADIOLOGY:  10/2013. MRI of liver. Diffuse swelling of pancreas with peripancreatic edema. Encasement of CDB by edematous pancreas head. Normal appearing liver. No liver mass lesions. Normal spleen. No dilated bile ducts. No bile duct strictures. No ascites. 8/2014. MRI of liver. Short yet severe stricture of the common duct within the pancreatic head without associated pancreatic ductal or intrahepatic biliary dilatation. There is questionable subtle decreased contrast enhancement within the pancreatic head. ERCP with endoscopic ultrasound suggested for further evaluation. 11/2015. MRCP. Obstruction of CBD in region of pennie hepatitis. Distal CBD is normal. Proximal to obstruction the intrahepatic bile ducts are dilated. 2017. ERCP By Dr. Kittie Buerger. Bile duct stricture and stented. Bile duct stone-extracted. OTHER TESTIN2013.  17. ASSESSMENT AND PLAN:  Autoimmune hepatitis with bridging fibrosis in . FibroScan in 2016 and today continue to demonstrate improvement in his liver disease after treatment with a fibrosis score of stage 1 out of 4. He will continue his current regimen. This has been effective and is well tolerated. Discussed with patient today the importance of continuing this regimen long-term. Fibrosis will be checked annually. Liver enzymes and liver function have been normal. Labs were done 1 week ago. We have not received his results yet. Will call to request a copy of his most recent results. He has had no abdominal pain and exacerbation of autoimmune pancreatitis since starting tacrolimus in 2016. Will obtain blood work a few days prior to the next appointment in 6 months. This will include tacrolimus blood level (patient instructed to hold tacrolimus until after lab work). This was given to patient today. The patient was directed to continue all current medications at the current dosages. There are no contraindications for the patient to take any medications that are necessary for treatment of other medical issues. All of the above issues were discussed with the patient. All questions were answered. The patient expressed a clear understanding of the above. [address] [address] in 6 months.     Iqra Perez NP  Liver [place] [address]  Ph: [phone]  Fax: [phone]  Email: [email or web address]

## 2018-01-03 ENCOUNTER — ANESTHESIA (OUTPATIENT)
Dept: ENDOSCOPY | Age: 68
End: 2018-01-03
Payer: COMMERCIAL

## 2018-01-03 ENCOUNTER — ANESTHESIA EVENT (OUTPATIENT)
Dept: ENDOSCOPY | Age: 68
End: 2018-01-03
Payer: COMMERCIAL

## 2018-01-03 ENCOUNTER — HOSPITAL ENCOUNTER (OUTPATIENT)
Age: 68
Setting detail: OUTPATIENT SURGERY
Discharge: HOME | End: 2018-01-03
Attending: INTERNAL MEDICINE | Admitting: INTERNAL MEDICINE
Payer: COMMERCIAL

## 2018-01-03 ENCOUNTER — APPOINTMENT (OUTPATIENT)
Dept: GENERAL RADIOLOGY | Age: 68
End: 2018-01-03
Attending: INTERNAL MEDICINE
Payer: COMMERCIAL

## 2018-01-03 VITALS
WEIGHT: 126 LBS | DIASTOLIC BLOOD PRESSURE: 89 MMHG | BODY MASS INDEX: 23.81 KG/M2 | TEMPERATURE: 97.6 F | OXYGEN SATURATION: 99 % | HEART RATE: 70 BPM | RESPIRATION RATE: 34 BRPM | SYSTOLIC BLOOD PRESSURE: 134 MMHG

## 2018-01-03 PROCEDURE — 74011250636 HC RX REV CODE- 250/636

## 2018-01-03 PROCEDURE — 74011636320 HC RX REV CODE- 636/320: Performed by: INTERNAL MEDICINE

## 2018-01-03 PROCEDURE — 74011000250 HC RX REV CODE- 250

## 2018-01-03 PROCEDURE — 77030012596 HC SPHNTOM BILI BSC -E: Performed by: INTERNAL MEDICINE

## 2018-01-03 PROCEDURE — 88112 CYTOPATH CELL ENHANCE TECH: CPT | Performed by: INTERNAL MEDICINE

## 2018-01-03 PROCEDURE — 76060000033 HC ANESTHESIA 1 TO 1.5 HR: Performed by: INTERNAL MEDICINE

## 2018-01-03 PROCEDURE — 76040000008: Performed by: INTERNAL MEDICINE

## 2018-01-03 PROCEDURE — 88305 TISSUE EXAM BY PATHOLOGIST: CPT | Performed by: INTERNAL MEDICINE

## 2018-01-03 PROCEDURE — 77030026438 HC STYL ET INTUB CARD -A: Performed by: ANESTHESIOLOGY

## 2018-01-03 PROCEDURE — 77030021561 HC FCPS BIOP SPYBITE BSC -F: Performed by: INTERNAL MEDICINE

## 2018-01-03 PROCEDURE — 77030007288 HC DEV LOK BILI BSC -A: Performed by: INTERNAL MEDICINE

## 2018-01-03 PROCEDURE — 77030013992 HC SNR POLYP ENDOSC BSC -B: Performed by: INTERNAL MEDICINE

## 2018-01-03 PROCEDURE — 77030008684 HC TU ET CUF COVD -B: Performed by: ANESTHESIOLOGY

## 2018-01-03 PROCEDURE — C2625 STENT, NON-COR, TEM W/DEL SY: HCPCS | Performed by: INTERNAL MEDICINE

## 2018-01-03 PROCEDURE — 77030036655 HC CATH ENDOSC ACC DEL SPYSCP BSC -H1: Performed by: INTERNAL MEDICINE

## 2018-01-03 PROCEDURE — 77030009426 HC FCPS BIOP ENDOSC BSC -B: Performed by: INTERNAL MEDICINE

## 2018-01-03 DEVICE — BILIARY STENT WITH NAVIFLEXTM RX DELIVERY SYSTEM
Type: IMPLANTABLE DEVICE | Site: BILE DUCT | Status: FUNCTIONAL
Brand: ADVANIX™ BILIARY

## 2018-01-03 RX ORDER — SODIUM CHLORIDE 0.9 % (FLUSH) 0.9 %
5-10 SYRINGE (ML) INJECTION AS NEEDED
Status: DISCONTINUED | OUTPATIENT
Start: 2018-01-03 | End: 2018-01-03 | Stop reason: HOSPADM

## 2018-01-03 RX ORDER — FENTANYL CITRATE 50 UG/ML
100 INJECTION, SOLUTION INTRAMUSCULAR; INTRAVENOUS
Status: DISCONTINUED | OUTPATIENT
Start: 2018-01-03 | End: 2018-01-03 | Stop reason: HOSPADM

## 2018-01-03 RX ORDER — ROCURONIUM BROMIDE 10 MG/ML
INJECTION, SOLUTION INTRAVENOUS AS NEEDED
Status: DISCONTINUED | OUTPATIENT
Start: 2018-01-03 | End: 2018-01-03 | Stop reason: HOSPADM

## 2018-01-03 RX ORDER — SODIUM CHLORIDE 0.9 % (FLUSH) 0.9 %
5-10 SYRINGE (ML) INJECTION EVERY 8 HOURS
Status: DISCONTINUED | OUTPATIENT
Start: 2018-01-03 | End: 2018-01-03 | Stop reason: HOSPADM

## 2018-01-03 RX ORDER — FLUMAZENIL 0.1 MG/ML
0.2 INJECTION INTRAVENOUS
Status: DISCONTINUED | OUTPATIENT
Start: 2018-01-03 | End: 2018-01-03 | Stop reason: HOSPADM

## 2018-01-03 RX ORDER — ONDANSETRON 2 MG/ML
INJECTION INTRAMUSCULAR; INTRAVENOUS AS NEEDED
Status: DISCONTINUED | OUTPATIENT
Start: 2018-01-03 | End: 2018-01-03 | Stop reason: HOSPADM

## 2018-01-03 RX ORDER — FENTANYL CITRATE 50 UG/ML
INJECTION, SOLUTION INTRAMUSCULAR; INTRAVENOUS AS NEEDED
Status: DISCONTINUED | OUTPATIENT
Start: 2018-01-03 | End: 2018-01-03 | Stop reason: HOSPADM

## 2018-01-03 RX ORDER — ATROPINE SULFATE 0.1 MG/ML
0.5 INJECTION INTRAVENOUS
Status: DISCONTINUED | OUTPATIENT
Start: 2018-01-03 | End: 2018-01-03 | Stop reason: HOSPADM

## 2018-01-03 RX ORDER — GLYCOPYRROLATE 0.2 MG/ML
INJECTION INTRAMUSCULAR; INTRAVENOUS AS NEEDED
Status: DISCONTINUED | OUTPATIENT
Start: 2018-01-03 | End: 2018-01-03 | Stop reason: HOSPADM

## 2018-01-03 RX ORDER — NEOSTIGMINE METHYLSULFATE 1 MG/ML
INJECTION INTRAVENOUS AS NEEDED
Status: DISCONTINUED | OUTPATIENT
Start: 2018-01-03 | End: 2018-01-03 | Stop reason: HOSPADM

## 2018-01-03 RX ORDER — FENTANYL CITRATE 50 UG/ML
INJECTION, SOLUTION INTRAMUSCULAR; INTRAVENOUS
Status: COMPLETED
Start: 2018-01-03 | End: 2018-01-03

## 2018-01-03 RX ORDER — MIDAZOLAM HYDROCHLORIDE 1 MG/ML
.25-1 INJECTION, SOLUTION INTRAMUSCULAR; INTRAVENOUS
Status: DISCONTINUED | OUTPATIENT
Start: 2018-01-03 | End: 2018-01-03 | Stop reason: HOSPADM

## 2018-01-03 RX ORDER — DEXTROMETHORPHAN/PSEUDOEPHED 2.5-7.5/.8
1.2 DROPS ORAL
Status: DISCONTINUED | OUTPATIENT
Start: 2018-01-03 | End: 2018-01-03 | Stop reason: HOSPADM

## 2018-01-03 RX ORDER — SODIUM CHLORIDE 9 MG/ML
INJECTION, SOLUTION INTRAVENOUS
Status: DISCONTINUED | OUTPATIENT
Start: 2018-01-03 | End: 2018-01-03 | Stop reason: HOSPADM

## 2018-01-03 RX ORDER — EPINEPHRINE 0.1 MG/ML
1 INJECTION INTRACARDIAC; INTRAVENOUS
Status: DISCONTINUED | OUTPATIENT
Start: 2018-01-03 | End: 2018-01-03 | Stop reason: HOSPADM

## 2018-01-03 RX ORDER — NALOXONE HYDROCHLORIDE 0.4 MG/ML
0.4 INJECTION, SOLUTION INTRAMUSCULAR; INTRAVENOUS; SUBCUTANEOUS
Status: DISCONTINUED | OUTPATIENT
Start: 2018-01-03 | End: 2018-01-03 | Stop reason: HOSPADM

## 2018-01-03 RX ORDER — SUCCINYLCHOLINE CHLORIDE 20 MG/ML
INJECTION INTRAMUSCULAR; INTRAVENOUS AS NEEDED
Status: DISCONTINUED | OUTPATIENT
Start: 2018-01-03 | End: 2018-01-03 | Stop reason: HOSPADM

## 2018-01-03 RX ORDER — LIDOCAINE HYDROCHLORIDE 20 MG/ML
INJECTION, SOLUTION EPIDURAL; INFILTRATION; INTRACAUDAL; PERINEURAL AS NEEDED
Status: DISCONTINUED | OUTPATIENT
Start: 2018-01-03 | End: 2018-01-03 | Stop reason: HOSPADM

## 2018-01-03 RX ORDER — PROPOFOL 10 MG/ML
INJECTION, EMULSION INTRAVENOUS AS NEEDED
Status: DISCONTINUED | OUTPATIENT
Start: 2018-01-03 | End: 2018-01-03 | Stop reason: HOSPADM

## 2018-01-03 RX ORDER — SODIUM CHLORIDE 9 MG/ML
50 INJECTION, SOLUTION INTRAVENOUS CONTINUOUS
Status: DISCONTINUED | OUTPATIENT
Start: 2018-01-03 | End: 2018-01-03 | Stop reason: HOSPADM

## 2018-01-03 RX ADMIN — ROCURONIUM BROMIDE 5 MG: 10 INJECTION, SOLUTION INTRAVENOUS at 10:19

## 2018-01-03 RX ADMIN — IOPAMIDOL 5 ML: 612 INJECTION, SOLUTION INTRAVENOUS at 10:18

## 2018-01-03 RX ADMIN — PROPOFOL 150 MG: 10 INJECTION, EMULSION INTRAVENOUS at 10:19

## 2018-01-03 RX ADMIN — ONDANSETRON 4 MG: 2 INJECTION INTRAMUSCULAR; INTRAVENOUS at 11:00

## 2018-01-03 RX ADMIN — LIDOCAINE HYDROCHLORIDE 50 MG: 20 INJECTION, SOLUTION EPIDURAL; INFILTRATION; INTRACAUDAL; PERINEURAL at 10:19

## 2018-01-03 RX ADMIN — ROCURONIUM BROMIDE 15 MG: 10 INJECTION, SOLUTION INTRAVENOUS at 10:34

## 2018-01-03 RX ADMIN — SUCCINYLCHOLINE CHLORIDE 120 MG: 20 INJECTION INTRAMUSCULAR; INTRAVENOUS at 10:19

## 2018-01-03 RX ADMIN — GLYCOPYRROLATE 0.4 MG: 0.2 INJECTION INTRAMUSCULAR; INTRAVENOUS at 11:13

## 2018-01-03 RX ADMIN — NEOSTIGMINE METHYLSULFATE 3 MG: 1 INJECTION INTRAVENOUS at 11:13

## 2018-01-03 RX ADMIN — SODIUM CHLORIDE: 9 INJECTION, SOLUTION INTRAVENOUS at 11:00

## 2018-01-03 RX ADMIN — FENTANYL CITRATE 50 MCG: 50 INJECTION, SOLUTION INTRAMUSCULAR; INTRAVENOUS at 10:19

## 2018-01-03 RX ADMIN — SODIUM CHLORIDE: 9 INJECTION, SOLUTION INTRAVENOUS at 09:43

## 2018-01-03 RX ADMIN — ROCURONIUM BROMIDE 10 MG: 10 INJECTION, SOLUTION INTRAVENOUS at 10:45

## 2018-01-03 NOTE — DISCHARGE INSTRUCTIONS
118 [facility].  [address] [facility] [address] [facility], [address]  [phone]                     DISCHARGE INSTRUCTIONS    María Ibanez  [id number]  1950    DISCOMFORT:  Sore throat- throat lozenges or warm salt water gargle  redness at IV site- apply warm compress to area; if redness or soreness persist- contact your physician  Gaseous discomfort- walking, belching will help relieve any discomfort  You may not operate a vehicle for 12 hours  You may not engage in an occupation involving machinery or appliances for rest of today  You may not drink alcoholic beverages for at least 12 hours  Avoid making any critical decisions for at least 24 hour  DIET  You may eat and drink after you leave. You may resume your regular diet - however -  remember your colon is empty and a heavy meal will produce gas. Avoid these foods:  vegetables, fried / greasy foods, carbonated drinks    ACTIVITY  You may resume your normal daily activities   Spend the remainder of the day resting -  avoid any strenuous activity. CALL M.D. ANY SIGN OF   Increasing pain, nausea, vomiting  Abdominal distension (swelling)  New increased bleeding (oral or rectal)  Fever (chills)  Pain in chest area  Bloody discharge from nose or mouth  Shortness of breath    Follow-up Instructions:   Call Dr. Terry Owen for any questions or problems. If we took a biopsy please call the office within 2 weeks to discuss your                             pathology results. Telephone # [phone]        Continue same medications.

## 2018-01-03 NOTE — PERIOP NOTES

## 2018-01-03 NOTE — IP AVS SNAPSHOT
[address] [facility] [address] 
[phone] Patient: Hay Gonzales MRN: [id number] LUIS:5/42/9725 About your hospitalization You were admitted on:  January 3, 2018 You last received care in the:  [facility] ENDOSCOPY You were discharged on:  January 3, 2018 Why you were hospitalized Your primary diagnosis was:  Not on File Follow-up Information None Discharge Orders None A check abdullahi indicates which time of day the medication should be taken. My Medications CONTINUE taking these medications Instructions Each Dose to Equal  
 Morning Noon Evening Bedtime CALCIUM 600 + D 600-125 mg-unit Tab Generic drug:  calcium-cholecalciferol (d3) Your last dose was: Your next dose is: Take  by mouth. FISH  mg Cap Generic drug:  Omega-3 Fatty Acids Your last dose was: Your next dose is: Take  by mouth. [address] Your last dose was: Your next dose is: Take  by mouth. tacrolimus 1 mg capsule Commonly known as:  PROGRAF Your last dose was: Your next dose is: Take 1 Cap by mouth every twelve (12) hours. 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  
 Morning Noon Evening Bedtime CALCIUM 600 + D 600-125 mg-unit Tab Generic drug:  calcium-cholecalciferol (d3) Your last dose was: Your next dose is: Take  by mouth. FISH  mg Cap Generic drug:  Omega-3 Fatty Acids Your last dose was: Your next dose is: Take  by mouth.   
     
   
   
   
  
 [address]  
   
 Your last dose was: Your next dose is: Take  by mouth. tacrolimus 1 mg capsule Commonly known as:  PROGRAF Your last dose was: Your next dose is: Take 1 Cap by mouth every twelve (12) hours. 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 
[address] [facility], [address] 
[phone] DISCHARGE INSTRUCTIONS Severa Brittle 
[id number] 
1950 DISCOMFORT: 
Sore throat- throat lozenges or warm salt water gargle 
redness at IV site- apply warm compress to area; if redness or soreness persist- contact your physician Gaseous discomfort- walking, belching will help relieve any discomfort You may not operate a vehicle for 12 hours You may not engage in an occupation involving machinery or appliances for rest of today You may not drink alcoholic beverages for at least 12 hours Avoid making any critical decisions for at least 24 hour DIET You may eat and drink after you leave. You may resume your regular diet  however -  remember your colon is empty and a heavy meal will produce gas. Avoid these foods:  vegetables, fried / greasy foods, carbonated drinks ACTIVITY You may resume your normal daily activities Spend the remainder of the day resting -  avoid any strenuous activity. CALL M.D. ANY SIGN OF Increasing pain, nausea, vomiting Abdominal distension (swelling) New increased bleeding (oral or rectal) Fever (chills) Pain in chest area Bloody discharge from nose or mouth Shortness of breath Follow-up Instructions: 
 Call Dr. Elise Reynolds for any questions or problems. If we took a biopsy please call the office within 2 weeks to discuss your                             pathology results. Telephone # [phone] Continue same medications. Introducing [facility] & HEALTH SERVICES! Dear Naty Espinoza: Thank you for requesting a Xercise4less account. Our records indicate that you have previously registered for a Xercise4less account but its currently inactive. Please call our Xercise4less support line at [phone]. Additional Information If you have questions, please visit the Frequently Asked Questions section of the Xercise4less website at [email or web address]...[email or web address] Remember, Xercise4less is NOT to be used for urgent needs. For medical emergencies, dial 911. Now available from your iPhone and Android! Unresulted Labs-Please follow up with your PCP about these lab tests Order Current Status XR ENDO ERCP COMP BILIARY PANC SI In process Providers Seen During Your Hospitalization Provider Specialty Primary office phone Fran Hendrickson MD Gastroenterology [phone] Your Primary Care Physician (PCP) Primary Care Physician Office Phone Office Fax Alida Richardson [phone] You are allergic to the following No active allergies Recent Documentation Weight BMI Smoking Status 57.2 kg 23.81 kg/m2 Never Smoker Emergency Contacts Name Discharge Info Relation Home Work Mobile Giorgio Trevizo DISCHARGE CAREGIVER [3] Child [2] [phone] Yang An CAREGIVER [3] Spouse [3] [phone] Patient Belongings The following personal items are in your possession at time of discharge: 
  Dental Appliances: None  Visual Aid: Glasses Please provide this summary of care documentation to your next provider. Signatures-by signing, you are acknowledging that this After Visit Summary has been reviewed with you and you have received a copy. Patient Signature:  ____________________________________________________________ Date:  ____________________________________________________________ `    
    
 Provider Signature:  ____________________________________________________________ Date:  ____________________________________________________________

## 2018-01-03 NOTE — IP AVS SNAPSHOT
[address] [facility] [address] [place] [address] 
[phone] Patient: Kanika Paula MRN: [id number] EI:5/30/8171 A check abdullahi indicates which time of day the medication should be taken. My Medications CONTINUE taking these medications Instructions Each Dose to Equal  
 Morning Noon Evening Bedtime CALCIUM 600 + D 600-125 mg-unit Tab Generic drug:  calcium-cholecalciferol (d3) Your last dose was: Your next dose is: Take  by mouth. FISH  mg Cap Generic drug:  Omega-3 Fatty Acids Your last dose was: Your next dose is: Take  by mouth. [address] Your last dose was: Your next dose is: Take  by mouth. tacrolimus 1 mg capsule Commonly known as:  PROGRAF Your last dose was: Your next dose is: Take 1 Cap by mouth every twelve (12) hours. 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

## 2018-01-03 NOTE — ANESTHESIA PREPROCEDURE EVALUATION
Anesthetic History   No history of anesthetic complications            Review of Systems / Medical History  Patient summary reviewed, nursing notes reviewed and pertinent labs reviewed    Pulmonary  Within defined limits                 Neuro/Psych   Within defined limits           Cardiovascular  Within defined limits                     GI/Hepatic/Renal           Liver disease     Endo/Other  Within defined limits           Other Findings              Physical Exam    Airway  Mallampati: II  TM Distance: > 6 cm  Neck ROM: normal range of motion   Mouth opening: Normal     Cardiovascular  Regular rate and rhythm,  S1 and S2 normal,  no murmur, click, rub, or gallop             Dental    Dentition: Upper dentition intact and Lower dentition intact     Pulmonary  Breath sounds clear to auscultation               Abdominal  GI exam deferred       Other Findings            Anesthetic Plan    ASA: 2  Anesthesia type: general          Induction: Intravenous  Anesthetic plan and risks discussed with: Patient

## 2018-01-03 NOTE — PROGRESS NOTES
[address] January 3, 2018       RE: Darion Hunter      To Whom It May Concern,    This is to certify that Darion Hunter was unable to attend work on January 3-6, 2018 due to medical reasons. Please feel free to contact my office if you have any questions or concerns. Thank you for your assistance in this matter.       Sincerely,  Dr. Janel Huertas [phone]

## 2018-01-03 NOTE — PROCEDURES
[address]  [address] [address]  [place], [address]  [phone]                   ERCP NOTE    NAME:  Diana Owusu   :   1950   MRN:   [id number]     Date/Time:  1/3/2018 11:16 AM    Procedure Type:   ERCPwith biliary stent change, biliary tissue sampling     Indications: Bile duct stricture, autoimmune pancreatitis  Pre-operative Diagnosis: see indication above  Post-operative Diagnosis:  See findings below  : Adri Dugan MD    Referring Provider:    Cindi Jimenes MD, Qing Herzog MD    Sedation:  General anesthesia    Procedure Details:  After informed consent was obtained with all risks and benefits of procedure explained, the patient was taken to the fluoroscopy suite and placed in the prone position. Upon sequential sedation as per above, the Olympus duodenoscope [id number]   was inserted via the mouthpeice and carefully advanced to the second portion of the duodenum. The quality of visualization was good. The duodenoscope was withdrawn into a short position. Findings:   Esophagus: not examined in detail  Stomach: not examined in detail  Duodenum/jejunum: normal  Ampulla:previous sphincterotomy  protruding stent  Cholangiogram: -One bile duct stent was removed using a snare. Stent was sent for evaluation for malignant cells to pathology. Bile duct was then cannulated using Dreamwire 0.035 in. Mild stenosis and irregularity was noted in distal common bile duct immediately proximal to the ampulla. The bile duct proximal to this area was normal. Bile duct was then cannulated with a Cholangioscope (SpRudder) over the guidewire and advanced to the biliary bifurcation. Irrigation with sterile saline was performed to enable good visualization. Persistent suctioning The biliary bifurcation was normal. There was mild mucosal irregularity in the common hepatic duct immediately proximal to the cystic duct insertion.  Mild irregularity was also noted at the distal common bile duct immediately above the ampulla. No definite mass was seen. Biliary washings were also obtained for cytology. A 10 Fr X 7 cm Advanix plastic biliary stent was placed. Stent was in good position and bile was draning freely. Pancreatogram:not performed    Specimen Removed:   ID Type Source Tests Collected by Time Destination   1 : CBD, common bile duct biopsy Preservative   Mikey Ortiz MD 1/3/2018 1104 Pathology   2 : ampullary bx Preservative   Mikey Ortiz MD 1/3/2018 1105 Pathology   3. Biliary stent for cytology  4. Bile duct washings for cytology    Complications: None. EBL:  None. Interventions:    Pancreatic: none  Biliary: One bile duct stent was removed using a snare. Stent was sent for evaluation for malignant cells to pathology. Bile duct was then cannulated using Dreamwire 0.035 in. Mild stenosis and irregularity was noted in distal common bile duct immediately proximal to the ampulla. The bile duct proximal to this area was normal. Bile duct was then cannulated with a Cholangioscope ([employer]) over the guidewire and advanced to the biliary bifurcation. Irrigation with sterile saline was performed to enable good visualization. Persistent suctioning The biliary bifurcation was normal. There was mild mucosal irregularity in the common hepatic duct immediately proximal to the cystic duct insertion. Mild irregularity was also noted at the distal common bile duct immediately above the ampulla. No definite mass was seen. Biliary washings were also obtained for cytology. A 10 Fr X 7 cm Advanix plastic biliary stent was placed. Stent was in good position and bile was draning freely.      Impression:  -Bile duct stricture- sampled and stented    Recommendations:    Clear liquid diet and advance as tolerated  ERCP with stent change in 3 months  Await pathology and cytology results  Watch for the complications      Discharge Disposition:  Home following recovery in Endoscopy    Antionekayley Wang MD  1/3/2018  11:16 AM

## 2018-01-03 NOTE — ROUTINE PROCESS
Jenelle Vieira  1950  [id number]    Situation:  Verbal report received from: Cyndy Andre  Procedure: Procedure(s):  ENDOSCOPIC RETROGRADE CHOLANGIOPANCREATOGRAPHY DIRECT VISUALIZATION (SPYGLASS)  ENDOSCOPIC RETROGRADE CHOLANGIOPANCREATOGRAPHY  ENDOSCOPIC FOREIGN BODY REMOVAL  ESOPHAGOGASTRODUODENAL (EGD) BIOPSY    Background:    Preoperative diagnosis: STRICTURE OF BILE DUCT   COMMON BILE DUCT CALCULUS   AUTOIMMUNE CHRONIC PANCREATITIS   Postoperative diagnosis: STRICTURE OF BILE DUCT    :  Dr. Estephanie Bales  Assistant(s): End[facility]ician[facility]  Endoscopy RN-1: Reyna Mayes RN    Specimens:   ID Type Source Tests Collected by Time Destination   1 : CBD, common bile duct biopsy Preservative   Lilliam Rivas MD 1/3/2018 1104 Pathology   2 : ampullary bx Preservative   Lilliam Rivas MD 1/3/2018 1105 Pathology     H. Pylori  no    Assessment:  Intra-procedure medications     Anesthesia gave intra-procedure sedation and medications, see anesthesia flow sheet yes    Intravenous fluids: NS@ KVO     Vital signs stable     Abdominal assessment: round and soft     Recommendation:  Discharge patient per MD order.   Return to floor  Family or Friend   Permission to share finding with family or friend yes

## 2018-01-03 NOTE — ANESTHESIA POSTPROCEDURE EVALUATION
Post-Anesthesia Evaluation and Assessment    Patient: Demetrice Steward MRN: [id number]  SSN: [id number]    YOB: 1950  Age: 78 y.o. Sex: male       Cardiovascular Function/Vital Signs  Visit Vitals    /89    Pulse 70    Temp 36.4 °C (97.6 °F)    Resp 16    Wt 57.2 kg (126 lb)    SpO2 99%    BMI 23.81 kg/m2       Patient is status post general anesthesia for Procedure(s):  ENDOSCOPIC RETROGRADE CHOLANGIOPANCREATOGRAPHY DIRECT VISUALIZATION (SPYGLASS)  ENDOSCOPIC RETROGRADE CHOLANGIOPANCREATOGRAPHY  ENDOSCOPIC FOREIGN BODY REMOVAL  ESOPHAGOGASTRODUODENAL (EGD) BIOPSY. Nausea/Vomiting: None    Postoperative hydration reviewed and adequate. Pain:  Pain Scale 1: Numeric (0 - 10) (01/03/18 1219)  Pain Intensity 1: 3 (01/03/18 1219)   Managed    Neurological Status: At baseline    Mental Status and Level of Consciousness: Arousable    Pulmonary Status:   O2 Device: Room air (01/03/18 1219)   Adequate oxygenation and airway patent    Complications related to anesthesia: None    Post-anesthesia assessment completed.  No concerns    Signed By: Pinky Duke MD     January 3, 2018

## 2018-01-03 NOTE — H&P
[address] [facility].  [address] [facility] 140 [facility], [address]  [phone]                                History and Physical     NAME: John Espinoza   :  1950   MRN:  [id number]     HPI:  The patient was seen and examined. Past Surgical History:   Procedure Laterality Date    ABDOMEN SURGERY PROC UNLISTED      hernia repair    HX OTHER SURGICAL      Surgury on testicles     Past Medical History:   Diagnosis Date    Liver disease     cholangitis     Social History   Substance Use Topics    Smoking status: Never Smoker    Smokeless tobacco: Never Used    Alcohol use No     No Known Allergies  Family History   Problem Relation Age of Onset    Cancer Mother     Cancer Sister     Cancer Brother      Current Facility-Administered Medications   Medication Dose Route Frequency    fentaNYL citrate (PF) 50 mcg/mL injection        glucagon (GLUCAGEN) injection 1 mg  1 mg IntraVENous ONCE         PHYSICAL EXAM:  General: WD, WN. Alert, cooperative, no acute distress    HEENT: NC, Atraumatic. PERRLA, EOMI. Anicteric sclerae. Lungs:  CTA Bilaterally. No Wheezing/Rhonchi/Rales. Heart:  Regular  rhythm,  No murmur, No Rubs, No Gallops  Abdomen: Soft, Non distended, Non tender.  +Bowel sounds, no HSM  Extremities: No c/c/e  Neurologic:  CN 2-12 gi, Alert and oriented X 3. No acute neurological distress   Psych:   Good insight. Not anxious nor agitated. The heart, lungs and mental status were satisfactory for the administration of GA and for the procedure.       Mallampati score: 2       Assessment:   · Bile duct stricture    Plan:   · Endoscopic procedure  · GA

## 2018-05-11 ENCOUNTER — ANESTHESIA EVENT (OUTPATIENT)
Dept: ENDOSCOPY | Age: 68
End: 2018-05-11
Payer: COMMERCIAL

## 2018-05-11 ENCOUNTER — HOSPITAL ENCOUNTER (OUTPATIENT)
Age: 68
Setting detail: OUTPATIENT SURGERY
Discharge: HOME | End: 2018-05-11
Attending: INTERNAL MEDICINE | Admitting: INTERNAL MEDICINE
Payer: COMMERCIAL

## 2018-05-11 ENCOUNTER — APPOINTMENT (OUTPATIENT)
Dept: GENERAL RADIOLOGY | Age: 68
End: 2018-05-11
Attending: INTERNAL MEDICINE
Payer: COMMERCIAL

## 2018-05-11 ENCOUNTER — ANESTHESIA (OUTPATIENT)
Dept: ENDOSCOPY | Age: 68
End: 2018-05-11
Payer: COMMERCIAL

## 2018-05-11 VITALS
HEART RATE: 73 BPM | WEIGHT: 125 LBS | RESPIRATION RATE: 17 BRPM | DIASTOLIC BLOOD PRESSURE: 75 MMHG | SYSTOLIC BLOOD PRESSURE: 121 MMHG | TEMPERATURE: 97.6 F | OXYGEN SATURATION: 100 % | HEIGHT: 61 IN | BODY MASS INDEX: 23.6 KG/M2

## 2018-05-11 LAB
ATRIAL RATE: 59 BPM
CALCULATED P AXIS, ECG09: 60 DEGREES
CALCULATED R AXIS, ECG10: 67 DEGREES
CALCULATED T AXIS, ECG11: 53 DEGREES
DIAGNOSIS, 93000: NORMAL
P-R INTERVAL, ECG05: 128 MS
Q-T INTERVAL, ECG07: 468 MS
QRS DURATION, ECG06: 144 MS
QTC CALCULATION (BEZET), ECG08: 463 MS
VENTRICULAR RATE, ECG03: 59 BPM

## 2018-05-11 PROCEDURE — 74011636320 HC RX REV CODE- 636/320: Performed by: INTERNAL MEDICINE

## 2018-05-11 PROCEDURE — 77030007288 HC DEV LOK BILI BSC -A: Performed by: INTERNAL MEDICINE

## 2018-05-11 PROCEDURE — 77030012596 HC SPHNTOM BILI BSC -E: Performed by: INTERNAL MEDICINE

## 2018-05-11 PROCEDURE — 74011250636 HC RX REV CODE- 250/636

## 2018-05-11 PROCEDURE — 77030009038 HC CATH BILI STN RTVR BSC -C: Performed by: INTERNAL MEDICINE

## 2018-05-11 PROCEDURE — 88305 TISSUE EXAM BY PATHOLOGIST: CPT | Performed by: INTERNAL MEDICINE

## 2018-05-11 PROCEDURE — 93005 ELECTROCARDIOGRAM TRACING: CPT

## 2018-05-11 PROCEDURE — 74011000250 HC RX REV CODE- 250

## 2018-05-11 PROCEDURE — 77030013992 HC SNR POLYP ENDOSC BSC -B: Performed by: INTERNAL MEDICINE

## 2018-05-11 PROCEDURE — C2625 STENT, NON-COR, TEM W/DEL SY: HCPCS | Performed by: INTERNAL MEDICINE

## 2018-05-11 PROCEDURE — 74011250636 HC RX REV CODE- 250/636: Performed by: INTERNAL MEDICINE

## 2018-05-11 PROCEDURE — 76040000007: Performed by: INTERNAL MEDICINE

## 2018-05-11 PROCEDURE — 76060000032 HC ANESTHESIA 0.5 TO 1 HR: Performed by: INTERNAL MEDICINE

## 2018-05-11 PROCEDURE — 77030009426 HC FCPS BIOP ENDOSC BSC -B: Performed by: INTERNAL MEDICINE

## 2018-05-11 DEVICE — BILIARY STENT WITH NAVIFLEXTM RX DELIVERY SYSTEM
Type: IMPLANTABLE DEVICE | Site: BILE DUCT | Status: FUNCTIONAL
Brand: ADVANIX™ BILIARY

## 2018-05-11 RX ORDER — EPINEPHRINE 0.1 MG/ML
1 INJECTION INTRACARDIAC; INTRAVENOUS
Status: DISCONTINUED | OUTPATIENT
Start: 2018-05-11 | End: 2018-05-11 | Stop reason: HOSPADM

## 2018-05-11 RX ORDER — FLUMAZENIL 0.1 MG/ML
0.2 INJECTION INTRAVENOUS
Status: DISCONTINUED | OUTPATIENT
Start: 2018-05-11 | End: 2018-05-11 | Stop reason: HOSPADM

## 2018-05-11 RX ORDER — SODIUM CHLORIDE 0.9 % (FLUSH) 0.9 %
SYRINGE (ML) INJECTION
Status: DISCONTINUED
Start: 2018-05-11 | End: 2018-05-11 | Stop reason: HOSPADM

## 2018-05-11 RX ORDER — FENTANYL CITRATE 50 UG/ML
100 INJECTION, SOLUTION INTRAMUSCULAR; INTRAVENOUS
Status: DISCONTINUED | OUTPATIENT
Start: 2018-05-11 | End: 2018-05-11 | Stop reason: HOSPADM

## 2018-05-11 RX ORDER — SODIUM CHLORIDE 0.9 % (FLUSH) 0.9 %
5-10 SYRINGE (ML) INJECTION AS NEEDED
Status: DISCONTINUED | OUTPATIENT
Start: 2018-05-11 | End: 2018-05-11 | Stop reason: HOSPADM

## 2018-05-11 RX ORDER — MIDAZOLAM HYDROCHLORIDE 1 MG/ML
.25-1 INJECTION, SOLUTION INTRAMUSCULAR; INTRAVENOUS
Status: DISCONTINUED | OUTPATIENT
Start: 2018-05-11 | End: 2018-05-11 | Stop reason: HOSPADM

## 2018-05-11 RX ORDER — LIDOCAINE HYDROCHLORIDE 20 MG/ML
INJECTION, SOLUTION EPIDURAL; INFILTRATION; INTRACAUDAL; PERINEURAL AS NEEDED
Status: DISCONTINUED | OUTPATIENT
Start: 2018-05-11 | End: 2018-05-11 | Stop reason: HOSPADM

## 2018-05-11 RX ORDER — ATROPINE SULFATE 0.1 MG/ML
0.5 INJECTION INTRAVENOUS
Status: DISCONTINUED | OUTPATIENT
Start: 2018-05-11 | End: 2018-05-11 | Stop reason: HOSPADM

## 2018-05-11 RX ORDER — DEXTROMETHORPHAN/PSEUDOEPHED 2.5-7.5/.8
1.2 DROPS ORAL
Status: DISCONTINUED | OUTPATIENT
Start: 2018-05-11 | End: 2018-05-11 | Stop reason: HOSPADM

## 2018-05-11 RX ORDER — NALOXONE HYDROCHLORIDE 0.4 MG/ML
0.4 INJECTION, SOLUTION INTRAMUSCULAR; INTRAVENOUS; SUBCUTANEOUS
Status: DISCONTINUED | OUTPATIENT
Start: 2018-05-11 | End: 2018-05-11 | Stop reason: HOSPADM

## 2018-05-11 RX ORDER — SODIUM CHLORIDE 0.9 % (FLUSH) 0.9 %
5-10 SYRINGE (ML) INJECTION EVERY 8 HOURS
Status: DISCONTINUED | OUTPATIENT
Start: 2018-05-11 | End: 2018-05-11 | Stop reason: HOSPADM

## 2018-05-11 RX ORDER — PROPOFOL 10 MG/ML
INJECTION, EMULSION INTRAVENOUS AS NEEDED
Status: DISCONTINUED | OUTPATIENT
Start: 2018-05-11 | End: 2018-05-11 | Stop reason: HOSPADM

## 2018-05-11 RX ORDER — DEXAMETHASONE SODIUM PHOSPHATE 4 MG/ML
INJECTION, SOLUTION INTRA-ARTICULAR; INTRALESIONAL; INTRAMUSCULAR; INTRAVENOUS; SOFT TISSUE AS NEEDED
Status: DISCONTINUED | OUTPATIENT
Start: 2018-05-11 | End: 2018-05-11 | Stop reason: HOSPADM

## 2018-05-11 RX ORDER — ONDANSETRON 2 MG/ML
INJECTION INTRAMUSCULAR; INTRAVENOUS AS NEEDED
Status: DISCONTINUED | OUTPATIENT
Start: 2018-05-11 | End: 2018-05-11 | Stop reason: HOSPADM

## 2018-05-11 RX ORDER — SODIUM CHLORIDE 9 MG/ML
50 INJECTION, SOLUTION INTRAVENOUS CONTINUOUS
Status: DISCONTINUED | OUTPATIENT
Start: 2018-05-11 | End: 2018-05-11 | Stop reason: HOSPADM

## 2018-05-11 RX ADMIN — PROPOFOL 20 MG: 10 INJECTION, EMULSION INTRAVENOUS at 08:43

## 2018-05-11 RX ADMIN — DEXAMETHASONE SODIUM PHOSPHATE 4 MG: 4 INJECTION, SOLUTION INTRA-ARTICULAR; INTRALESIONAL; INTRAMUSCULAR; INTRAVENOUS; SOFT TISSUE at 08:34

## 2018-05-11 RX ADMIN — PROPOFOL 20 MG: 10 INJECTION, EMULSION INTRAVENOUS at 08:41

## 2018-05-11 RX ADMIN — PROPOFOL 30 MG: 10 INJECTION, EMULSION INTRAVENOUS at 08:37

## 2018-05-11 RX ADMIN — PROPOFOL 30 MG: 10 INJECTION, EMULSION INTRAVENOUS at 08:38

## 2018-05-11 RX ADMIN — SODIUM CHLORIDE: 900 INJECTION, SOLUTION INTRAVENOUS at 08:00

## 2018-05-11 RX ADMIN — PROPOFOL 50 MG: 10 INJECTION, EMULSION INTRAVENOUS at 08:33

## 2018-05-11 RX ADMIN — PROPOFOL 20 MG: 10 INJECTION, EMULSION INTRAVENOUS at 08:45

## 2018-05-11 RX ADMIN — PROPOFOL 50 MG: 10 INJECTION, EMULSION INTRAVENOUS at 08:35

## 2018-05-11 RX ADMIN — ONDANSETRON 4 MG: 2 INJECTION INTRAMUSCULAR; INTRAVENOUS at 08:34

## 2018-05-11 RX ADMIN — PROPOFOL 30 MG: 10 INJECTION, EMULSION INTRAVENOUS at 08:47

## 2018-05-11 RX ADMIN — LIDOCAINE HYDROCHLORIDE 40 MG: 20 INJECTION, SOLUTION EPIDURAL; INFILTRATION; INTRACAUDAL; PERINEURAL at 08:33

## 2018-05-11 NOTE — ANESTHESIA POSTPROCEDURE EVALUATION
Post-Anesthesia Evaluation and Assessment    Patient: Hemant Damon MRN: [id number]  SSN: [id number]    YOB: 1950  Age: 80 y.o. Sex: male       Cardiovascular Function/Vital Signs  Visit Vitals    /69    Pulse (!) 58    Temp 36.4 °C (97.6 °F)    Resp 24    Ht 5' 1\" (1.549 m)    Wt 56.7 kg (125 lb)    SpO2 100%    BMI 23.62 kg/m2       Patient is status post MAC anesthesia for Procedure(s):  ENDOSCOPIC RETROGRADE CHOLANGIOPANCREATOGRAPHY (ERCP)  ENDOSCOPY WITH PROSTHESIS OR STENT REMOVAL  ENDOSCOPIC STONE EXTRACTION/BALLOON SWEEP  ESOPHAGOGASTRODUODENAL (EGD) BIOPSY  ENDOSCOPY WITH PROSTHESIS OR STENT PLACEMENT. Nausea/Vomiting: None    Postoperative hydration reviewed and adequate. Pain:  Pain Scale 1: Numeric (0 - 10) (05/11/18 0915)  Pain Intensity 1: 0 (05/11/18 0915)   Managed    Neurological Status: At baseline    Mental Status and Level of Consciousness: Arousable    Pulmonary Status:   O2 Device: Room air (05/11/18 0910)   Adequate oxygenation and airway patent    Complications related to anesthesia: None    Post-anesthesia assessment completed.  No concerns    Signed By: Wesley Dennis MD     May 11, 2018

## 2018-05-11 NOTE — IP AVS SNAPSHOT
[address] [facility] 1400 [address] [place] [address] 
[phone] Patient: Liliane Cordoba MRN: [id number] [place] Home:5/61/8438 About your hospitalization You were admitted on:  May 11, 2018 You last received care in the:  [address] [place] [address] ENDOSCOPY You were discharged on:  May 11, 2018 Why you were hospitalized Your primary diagnosis was:  Not on File Follow-up Information None Your Scheduled Appointments Tuesday May 22, 2018  2:00 PM EDT Follow Up with April [address] ([facility]) 200 [facility] [phone] [address] [place] [address]  
[phone] Discharge Orders None A check abdullahi indicates which time of day the medication should be taken. My Medications ASK your doctor about these medications Instructions Each Dose to Equal  
 Morning Noon Evening Bedtime CALCIUM 600 + D 600-125 mg-unit Tab Generic drug:  calcium-cholecalciferol (d3) Your last dose was: Your next dose is: Take  by mouth. FISH  mg Cap Generic drug:  Omega-3 Fatty Acids Your last dose was: Your next dose is: Take  by mouth. [address] Your last dose was: Your next dose is: Take  by mouth. tacrolimus 1 mg capsule Commonly known as:  PROGRAF Your last dose was: Your next dose is: Take 1 Cap by mouth every twelve (12) hours. 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 
[address] [place] [address] 
[phone] DISCHARGE INSTRUCTIONS Liliane Cordoba 
[id number] 
1950 DISCOMFORT: 
Sore throat- throat lozenges or warm salt water gargle redness at IV site- apply warm compress to area; if redness or soreness persist- contact your physician Gaseous discomfort- walking, belching will help relieve any discomfort You may not operate a vehicle for 12 hours You may not engage in an occupation involving machinery or appliances for rest of today You may not drink alcoholic beverages for at least 12 hours Avoid making any critical decisions for at least 24 hour DIET You may eat and drink after you leave. You may resume your regular diet  however -  remember your colon is empty and a heavy meal will produce gas. Avoid these foods:  vegetables, fried / greasy foods, carbonated drinks ACTIVITY You may resume your normal daily activities Spend the remainder of the day resting -  avoid any strenuous activity. CALL M.D. ANY SIGN OF Increasing pain, nausea, vomiting Abdominal distension (swelling) New increased bleeding (oral or rectal) Fever (chills) Pain in chest area Bloody discharge from nose or mouth Shortness of breath Follow-up Instructions: 
 Call Dr. Cesia Jarvis for any questions or problems. If we took a biopsy please call the office within 2 weeks to discuss your pathology results. Telephone # [phone] Continue same medications. Introducing [facility]! Dear Avni Abernathy: Thank you for requesting a [employer] account. Our records indicate that you have previously registered for a [employer]ber account but its currently inactive. Please call our [employer]ber support line at [phone]. Additional Information If you have questions, please visit the Frequently Asked Questions section of the [employer] website at [email or web address]. [employer]/. Remember, [employer]ber is NOT to be used for urgent needs. For medical emergencies, dial 911. Now available from your iPhone and Android! Introducing [employer]aga As a [employer] patient, I wanted to make you aware of our electronic visit tool called [employer]. 28[employer] allows you to connect within minutes with a medical provider 24 hours a day, seven days a week via a mobile device or tablet or logging into a secure website from your computer. You can access [employer] from anywhere in the [place]. A virtual visit might be right for you when you have a simple condition and feel like you just dont want to get out of bed, or cant get away from work for an appointment, when your regular [facility]  provider is not available (evenings, weekends or holidays), or when youre out of town and need minor care. Electronic visits cost only $49 and if the [employer] provider determines a prescription is needed to treat your condition, one can be electronically transmitted to a nearby pharmacy*. Please take a moment to enroll today if you have not already done so. The enrollment process is free and takes just a few minutes. To enroll, please download the [employer] azalia to your tablet or phone, or visit [email or web address] to enroll on your computer. And, as an [address] [place] [address] patient with a [employer] account, the results of your visits will be scanned into your electronic medical record and your primary care provider will be able to view the scanned results. We urge you to continue to see your regular [facility]  provider for your ongoing medical care. And while your primary care provider may not be the one available when you seek a [employer] virtual visit, the peace of mind you get from getting a real diagnosis real time can be priceless. For more information on [employer], view our Frequently Asked Questions (FAQs) at [email or web address]. Sincerely, 
 
Bakari Christianson MD 
Chief Medical Officer Jean Claude Mays *:  certain medications cannot be prescribed via [employer] Unresulted Labs-Please follow up with your PCP about these lab tests Order Current Status XR ENDO ERCP COMP BILIARY PANC SI In process EKG, 12 LEAD, INITIAL Preliminary result Providers Seen During Your Hospitalization Provider Specialty Primary office phone Lola Sorenson MD Gastroenterology [phone] Your Primary Care Physician (PCP) Primary Care Physician Office Phone Office Fax Tank Sanchez [phone] You are allergic to the following No active allergies Recent Documentation Height Weight BMI Smoking Status 1.549 m 56.7 kg 23.62 kg/m2 Never Smoker Emergency Contacts Name Discharge Info Relation Home Work Mobile Giorgio Trevizo DISCHARGE CAREGIVER [3] Child [2] [phone] Glade Hammans CAREGIVER [3] Spouse [3] [phone] Patient Belongings The following personal items are in your possession at time of discharge: 
     Visual Aid: Glasses Please provide this summary of care documentation to your next provider. Signatures-by signing, you are acknowledging that this After Visit Summary has been reviewed with you and you have received a copy. Patient Signature:  ____________________________________________________________ Date:  ____________________________________________________________  
  
Eyad Merino Provider Signature:  ____________________________________________________________ Date:  ____________________________________________________________

## 2018-05-11 NOTE — ROUTINE PROCESS
Clarita Tyler  1950  [id number]    Situation:  Verbal report received from: Liseth Stern RN  Procedure: Procedure(s):  ENDOSCOPIC RETROGRADE CHOLANGIOPANCREATOGRAPHY (ERCP)  ENDOSCOPY WITH PROSTHESIS OR STENT REMOVAL  ENDOSCOPIC STONE EXTRACTION/BALLOON SWEEP  ESOPHAGOGASTRODUODENAL (EGD) BIOPSY    Background:    Preoperative diagnosis: COMMON BILE DUCT CALCULUS, STRICTURE OF BILE DUCT  Postoperative diagnosis: 1. Bile Duct Stricture  2.  Abnormal Mucosa of the Ampulla    :  Dr. Donna Rdz  Assistant(s): Endoscopy Technician-1: Kimmy Ferris  Endoscopy RN-1: Carolina Davis RN    Specimens:   ID Type Source Tests Collected by Time Destination   1 : Ampullary Biopsy for Abnormal Mucosa Preservative   Shawna Saunders MD 5/11/2018 5082 Pathology     H. Pylori  no    Assessment:  Intra-procedure medications         Anesthesia gave intra-procedure sedation and medications, see anesthesia flow sheet yes    Intravenous fluids: NS@ KVO     Vital signs stable     Abdominal assessment: round and soft     Recommendation:  Discharge patient per MD order  Family or Friend Pt son   Permission to share finding with family or friend yes

## 2018-05-11 NOTE — PROGRESS NOTES

## 2018-05-11 NOTE — PROGRESS NOTES
Copy of EKG sent home with family and discussed at discharge. Pt to follow up with PCP next week and may need possible f/u with a cardiologist. Family understood d/charge instructions.

## 2018-05-11 NOTE — PROCEDURES
118 [facility].  [address] [place] [address] [address], [address]  [phone]                   ERCP NOTE    NAME:  Osmin Rodriguez   :   1950   MRN:   [id number]     Date/Time:  2018 8:58 AM    Procedure Type:   ERCPwith biliary stent change, ampullary biopsy     Indications: biliary obstruction  Pre-operative Diagnosis: see indication above  Post-operative Diagnosis:  See findings below  : Janice Holt MD    Referring Provider:    Laura Siegel MD , Mario Alberto Reyes MD    Sedation:  MAC anesthesia    Procedure Details:  After informed consent was obtained with all risks and benefits of procedure explained, the patient was taken to the fluoroscopy suite and placed in the prone position. Upon sequential sedation as per above, the Olympus duodenoscope [id number]   was inserted via the mouthpeice and carefully advanced to the second portion of the duodenum. The quality of visualization was good. The duodenoscope was withdrawn into a short position. Findings:   Esophagus: not examined in detail  Stomach: not examined in detail  Duodenum/jejunum: normal  Ampulla:protruding stent  Mucosa appeared scaly at the ampulla  Cholangiogram: -Bile duct stent was removed using a snare. Bile duct was then cannulated using a Dreamwire. Contrast was injected. Mild stenosis and irregularity was noted at the ampullary level. No definite mass was seen. Balloon sweep with 9-12 mm yelded no stones or sludge and balloon came out smoothly without much resistance. However, the spontaneous drainage of contrast was poor. Hence a 10 Fr X 7 cm Advanix biliary stent was placed. Biopsy of the ampulla and distal bile duct was obtained. Pancreatogram:not performed    Specimen Removed:   ID Type Source Tests Collected by Time Destination   1 : Ampullary Biopsy for Abnormal Mucosa Preservative   Janice Holt MD 2018 9399 Pathology       Complications: None.      EBL: None.    Interventions:    Pancreatic: none  Biliary: Bile duct stent was removed using a snare. Bile duct was then cannulated using a Dreamwire. Contrast was injected. Mild stenosis and irregularity was noted at the ampullary level. No definite mass was seen. Balloon sweep with 9-12 mm yelded no stones or sludge and balloon came out smoothly without much resistance. However, the spontaneous drainage of contrast was poor. Hence a 10 Fr X 7 cm Advanix biliary stent was placed. Biopsy of the ampulla and distal bile duct was obtained. Impression:  -Bile duct stricture-stent changed  -Abnormal mucosa ampulla-biopsied    Recommendations:    Clear liquid diet and advance as tolerated. Resume normal medication(s).     ERCP and stent change in 3-4 months  Await pathology      Discharge Disposition:  Home following recovery in Endoscopy    Jason Adams MD  5/11/2018  8:58 AM

## 2018-05-11 NOTE — DISCHARGE INSTRUCTIONS
118 [facility].  217 [facility] [address] [facility], [address]  [phone]                     DISCHARGE INSTRUCTIONS    Messi Sweeney  [id number]  1950    DISCOMFORT:  Sore throat- throat lozenges or warm salt water gargle  redness at IV site- apply warm compress to area; if redness or soreness persist- contact your physician  Gaseous discomfort- walking, belching will help relieve any discomfort  You may not operate a vehicle for 12 hours  You may not engage in an occupation involving machinery or appliances for rest of today  You may not drink alcoholic beverages for at least 12 hours  Avoid making any critical decisions for at least 24 hour  DIET  You may eat and drink after you leave. You may resume your regular diet - however -  remember your colon is empty and a heavy meal will produce gas. Avoid these foods:  vegetables, fried / greasy foods, carbonated drinks    ACTIVITY  You may resume your normal daily activities   Spend the remainder of the day resting -  avoid any strenuous activity. CALL M.D. ANY SIGN OF   Increasing pain, nausea, vomiting  Abdominal distension (swelling)  New increased bleeding (oral or rectal)  Fever (chills)  Pain in chest area  Bloody discharge from nose or mouth  Shortness of breath    Follow-up Instructions:   Call Dr. Hay Gil for any questions or problems. If we took a biopsy please call the office within 2 weeks to discuss your pathology results. Telephone # [phone]       Continue same medications.

## 2018-05-11 NOTE — ANESTHESIA PREPROCEDURE EVALUATION
Anesthetic History   No history of anesthetic complications            Review of Systems / Medical History  Patient summary reviewed, nursing notes reviewed and pertinent labs reviewed    Pulmonary  Within defined limits                 Neuro/Psych   Within defined limits           Cardiovascular  Within defined limits                Exercise tolerance: >4 METS     GI/Hepatic/Renal           Liver disease     Endo/Other  Within defined limits           Other Findings              Physical Exam    Airway  Mallampati: II  TM Distance: > 6 cm  Neck ROM: normal range of motion   Mouth opening: Normal     Cardiovascular  Regular rate and rhythm,  S1 and S2 normal,  no murmur, click, rub, or gallop             Dental    Dentition: Upper dentition intact and Lower dentition intact     Pulmonary  Breath sounds clear to auscultation               Abdominal  GI exam deferred       Other Findings            Anesthetic Plan    ASA: 2  Anesthesia type: general          Induction: Intravenous  Anesthetic plan and risks discussed with: Patient

## 2018-05-15 NOTE — H&P
118 [facility].  217 [facility] 140 [facility], [address]  [phone]                                History and Physical     NAME: Kelvin Rogers   :  1950   MRN:  [id number]     HPI:  The patient was seen and examined. Past Surgical History:   Procedure Laterality Date    ABDOMEN SURGERY PROC UNLISTED      hernia repair    HX OTHER SURGICAL      Surgury on testicles     Past Medical History:   Diagnosis Date    Liver disease     cholangitis     Social History   Substance Use Topics    Smoking status: Never Smoker    Smokeless tobacco: Never Used    Alcohol use No     No Known Allergies  Family History   Problem Relation Age of Onset    Cancer Mother     Cancer Sister     Cancer Brother      No current facility-administered medications for this encounter. Current Outpatient Prescriptions   Medication Sig    calcium-cholecalciferol, d3, (CALCIUM 600 + D) 600-125 mg-unit tab Take  by mouth.  Omega-3 Fatty Acids (FISH OIL) 300 mg cap Take  by mouth.  GLUCOSAM/CHOND/HYALU/CF BORATE (MOVE FREE JOINT HEALTH PO) Take  by mouth.  tacrolimus (PROGRAF) 1 mg capsule Take 1 Cap by mouth every twelve (12) hours. 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 Alert, cooperative, no acute distress    HEENT: NC, Atraumatic. PERRLA, EOMI. Anicteric sclerae. Lungs:  CTA Bilaterally. No Wheezing/Rhonchi/Rales. Heart:  Regular  rhythm,  No murmur, No Rubs, No Gallops  Abdomen: Soft, Non distended, Non tender.  +Bowel sounds, no HSM  Extremities: No c/c/e  Neurologic:  CN 2-12 gi, Alert and oriented X 3. No acute neurological distress   Psych:   Good insight. Not anxious nor agitated. The heart, lungs and mental status were satisfactory for the administration of MAC sedation and for the procedure.       Mallampati score: 2       Assessment:   · Bile duct stricture    Plan:   · Endoscopic procedure  · MAC sedation ·

## 2018-05-16 LAB
ALBUMIN SERPL-MCNC: 4.6 G/DL (ref 3.6–4.8)
ALBUMIN/GLOB SERPL: 2.1 {RATIO} (ref 1.2–2.2)
ALP SERPL-CCNC: 70 IU/L (ref 39–117)
ALT SERPL-CCNC: 14 IU/L (ref 0–44)
AST SERPL-CCNC: 18 IU/L (ref 0–40)
BILIRUB SERPL-MCNC: 0.4 MG/DL (ref 0–1.2)
BUN SERPL-MCNC: 20 MG/DL (ref 8–27)
BUN/CREAT SERPL: 22 (ref 10–24)
CALCIUM SERPL-MCNC: 9.1 MG/DL (ref 8.6–10.2)
CHLORIDE SERPL-SCNC: 104 MMOL/L (ref 96–106)
CO2 SERPL-SCNC: 21 MMOL/L (ref 18–29)
CREAT SERPL-MCNC: 0.9 MG/DL (ref 0.76–1.27)
ERYTHROCYTE [DISTWIDTH] IN BLOOD BY AUTOMATED COUNT: 14.4 % (ref 12.3–15.4)
GFR SERPLBLD CREATININE-BSD FMLA CKD-EPI: 102 ML/MIN/1.73
GFR SERPLBLD CREATININE-BSD FMLA CKD-EPI: 88 ML/MIN/1.73
GLOBULIN SER CALC-MCNC: 2.2 G/DL (ref 1.5–4.5)
GLUCOSE SERPL-MCNC: 106 MG/DL (ref 65–99)
HCT VFR BLD AUTO: 36.1 % (ref 37.5–51)
HGB BLD-MCNC: 11.7 G/DL (ref 13–17.7)
MCH RBC QN AUTO: 29.9 PG (ref 26.6–33)
MCHC RBC AUTO-ENTMCNC: 32.4 G/DL (ref 31.5–35.7)
MCV RBC AUTO: 92 FL (ref 79–97)
PLATELET # BLD AUTO: 256 X10E3/UL (ref 150–379)
POTASSIUM SERPL-SCNC: 4.4 MMOL/L (ref 3.5–5.2)
PROT SERPL-MCNC: 6.8 G/DL (ref 6–8.5)
RBC # BLD AUTO: 3.91 X10E6/UL (ref 4.14–5.8)
SODIUM SERPL-SCNC: 140 MMOL/L (ref 134–144)
TACROLIMUS, 700249: 3.5 NG/ML (ref 2–20)
WBC # BLD AUTO: 7.3 X10E3/UL (ref 3.4–10.8)

## 2018-05-22 ENCOUNTER — OFFICE VISIT (OUTPATIENT)
Dept: HEMATOLOGY | Age: 68
End: 2018-05-22

## 2018-05-22 VITALS
WEIGHT: 122 LBS | TEMPERATURE: 96.8 F | OXYGEN SATURATION: 99 % | BODY MASS INDEX: 23.05 KG/M2 | DIASTOLIC BLOOD PRESSURE: 70 MMHG | SYSTOLIC BLOOD PRESSURE: 112 MMHG | HEART RATE: 75 BPM

## 2018-05-22 DIAGNOSIS — K75.4 AUTOIMMUNE HEPATITIS (HCC): ICD-10-CM

## 2018-05-22 DIAGNOSIS — K86.1 AUTOIMMUNE SCLEROSING PANCREATITIS (HCC): Primary | ICD-10-CM

## 2018-05-22 DIAGNOSIS — K86.1 AUTOIMMUNE SCLEROSING PANCREATITIS (HCC): ICD-10-CM

## 2018-05-22 NOTE — PROGRESS NOTES
[address] Marybel Kirkpatrick MD, Nikki Cornelius, [facility] [place], [place]       RONI Lorenzo, MATTY Reed, [facility]   RONI Gutierrez NP        at [address], [address] [address]     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 The active problem list, all pertinent past medical history, medications, endoscopic studies, radiologic findings and laboratory findings related to the liver disorder were reviewed with the patient. The patient also has autoimmune pancreatitis. Both autoimmune diseases were treated with prednisone and Imuran. However, whenever the prednisone dose is lowered, both the hepatitis and pancreatitis flare. 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 Fibroscan in August 2016 and November 2017 both demonstrated portal fibrosis after successful treatment of AIH. He was started on tacrolimus in 1/2016. He was instructed to discontinue prednisone and Imuran. He is tolerating tacrolimus well. His liver enzymes and function have both remained normal with his current dose. MRI of the liver was performed in 11/2015. There was a stricture of the common bile duct. Dr. Cesia Jarvis treated this with a stent but it did not remain open so he placed a metal stent in the PD in 4/2016.  In October 2017, Dr. Cesia Jarvis went back in and placed another stent and removed a bile duct stone with no complications. The patient feels well today and has no new physical complaints. He continues to complain of increased gas and occasional abdominal discomfort. Probiotic improves his symptoms. The patient has no limitations in functional activities which can be attributed to the liver disease. Today, patient denies change in bowel habits, dark urine, myalgias, arthralgias, pruritus and problems concentrating. ALLERGIES:  No Known Allergies    MEDICATIONS  Current Outpatient Prescriptions   Medication Sig    calcium-cholecalciferol, d3, (CALCIUM 600 + D) 600-125 mg-unit tab Take  by mouth.  Omega-3 Fatty Acids (FISH OIL) 300 mg cap Take  by mouth.  GLUCOSAM/CHOND/HYALU/CF BORATE (MOVE FREE Joinity PO) Take  by mouth.  tacrolimus (PROGRAF) 1 mg capsule Take 1 Cap by mouth every twelve (12) hours. 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 SYSTEM REVIEW NOT RELATED TO LIVER DISEASE OR REVIEWED ABOVE:  Constitution systems: Negative for fever, chills, weight gain, weight loss. Eyes: Negative for visual changes. ENT: Negative for sore throat, painful swallowing. Respiratory: Negative for cough, hemoptysis, SOB. Cardiology: Negative for chest pain, palpitations. GI:  Negative for constipation or diarrhea. : Negative for urinary frequency, dysuria, hematuria, nocturia. Skin: Negative for rash. Hematology: Negative for easy bruising, blood clots. Musculo-skelatal: Negative for back pain, muscle pain, weakness. Neurologic: Negative for headaches, dizziness, vertigo, memory problems not related to HE. Psychology: Negative for anxiety, depression. FAMILY HISTORY:  The father  of colon cancer. The mother has the following chronic diseases: has an autoimmune disease. There is no family history of liver disease.   There is a family history of autoimmune diseases. SOCIAL HISTORY:  The patient is . The patient has 2 children. The patient has never used tobacco products. The patient has never consumed significant amounts of alcohol. The patient currently works full time in the [employer] at [email or web address]. PHYSICAL EXAMINATION:  Visit Vitals    /70 (BP 1 Location: Right arm, BP Patient Position: Sitting)    Pulse 75    Temp 96.8 °F (36 °C) (Tympanic)    Wt 122 lb (55.3 kg)    SpO2 99%    BMI 23.05 kg/m2     General: No acute distress. Eyes: Sclera anicteric. ENT: No oral lesions. Thyroid normal.  Nodes: No adenopathy. Skin: No spider angiomata. No jaundice. No palmar erythema. Respiratory: Lungs clear to auscultation. Cardiovascular: Regular heart rate. No murmurs. No JVD. Abdomen: Soft non-tender. Liver size normal to percussion/palpation. Spleen not palpable. No obvious ascites. Extremities: No edema. No muscle wasting. No gross arthritic changes. Neurologic: Alert and oriented. Cranial nerves grossly intact. No asterixsis. LABORATORY STUDIES:  Liver [place] [address] & Units 5/15/2018 11/29/2016   WBC 3.4 - 10.8 x10E3/uL 7.3    ANC x10E3/uL     HGB 13.0 - 17.7 g/dL 11.7 (L)     - 379 x10E3/uL 256    INR 0.8 - 1.2     AST 0 - 40 IU/L 18 22   ALT 0 - 44 IU/L 14 24   Alk Phos 39 - 117 IU/L 70 99   Bili, Total 0.0 - 1.2 mg/dL 0.4 0.4   Bili, Direct 0.00 - 0.40 mg/dL     Albumin 3.6 - 4.8 g/dL 4.6 4.4   BUN 8 - 27 mg/dL 20 22   Creat 0.76 - 1.27 mg/dL 0.90 1.08   Na 134 - 144 mmol/L 140 139   K 3.5 - 5.2 mmol/L 4.4 5.0   Cl 96 - 106 mmol/L 104 100   CO2 18 - 29 mmol/L 21 24   Glucose 65 - 99 mg/dL 106 (H) 148 (H)     Liver Virology and Transplant Immune Suppression Tacrolimus Level   Latest Ref Rng & Units 2.0 - 20.0 ng/mL   5/15/2018 3.5   11/29/2016 8.3   8/9/2016 9.9   4/29/2016 7.0   3/25/2016 21.4 (HH)     SEROLOGIES:  10/2013.   HBsAntigen negative, anti-HBcore positive, anti-HBsurface positive, HBE antigen negative, Anti-HBE positive,HBV DNA undetectable, Ferritin 797,     Serologies Latest Ref Rng 6/3/2014   APRIL, IFA  See patterns   APRIL Pattern  1:160 (H)   C-ANCA Neg:<1:20 titer <1:20   P-ANCA Neg:<1:20 titer <1:20   ANCA Neg:<1:20 titer <1:20   ASMCA 0 - 19 Units 25 (H)   M2 Ab 0.0 - 20.0 Units 3.8     LIVER HISTOLOGY:  . Reviewed by MLS. Moderate inflammation with stage 3 fibrosis. 2016. [facility]. EkPa was 7.4. Suggested fibrosis level is F1.  2017. FibroScan performed at [facility]. EkPa was 8.1. Suggested fibrosis level is F1.    ENDOSCOPIC PROCEDURES:  2013. EGD by Dr Jeff Bond. Gastritis and small gastric ulcer. 2013. EGD by Dr Jeff Bond. Healed gastritis and .  2015. ERCP. Common bile duct stricture. Stent placed. 2016. ERCP by Dr Manuel Doherty. Pancreatic stricture. Covered metal stent placed. RADIOLOGY:  10/2013. MRI of liver. Diffuse swelling of pancreas with peripancreatic edema. Encasement of CDB by edematous pancreas head. Normal appearing liver. No liver mass lesions. Normal spleen. No dilated bile ducts. No bile duct strictures. No ascites. 2014. MRI of liver. Short yet severe stricture of the common duct within the pancreatic head without associated pancreatic ductal or intrahepatic biliary dilatation. There is questionable subtle decreased contrast enhancement within the pancreatic head. ERCP with endoscopic ultrasound suggested for further evaluation. 2015. MRCP. Obstruction of CBD in region of pennie hepatitis. Distal CBD is normal. Proximal to obstruction the intrahepatic bile ducts are dilated. 2017. ERCP By Dr. Manuel Doherty. Bile duct stricture and stented. Bile duct stone-extracted. OTHER TESTIN2013.  17. ASSESSMENT AND PLAN:  Autoimmune hepatitis with bridging fibrosis in .  FibroScan in 2016 and 2017 demonstrated improvement in his liver disease after treatment with a fibrosis score of stage 1 out of 4. He will continue his current regimen. This has been effective and is well tolerated. Discussed with patient today the importance of continuing this regimen long-term. Fibrosis will be checked annually. Liver enzymes and liver function have been normal.     Will obtain blood work a few days prior to the next appointment in 6-8 months. This will include tacrolimus blood level (patient instructed to hold tacrolimus until after lab work). This was given to patient today. The patient was directed to continue all current medications at the current dosages. There are no contraindications for the patient to take any medications that are necessary for treatment of other medical issues. All of the above issues were discussed with the patient. All questions were answered. The patient expressed a clear understanding of the above. [address] [place] [address] in 8 months with FibroScan.     Matthew Michael NP  Liver [place] [address]  Ph: [phone]  Fax: [phone]  Email: [email or web address]

## 2018-05-22 NOTE — MR AVS SNAPSHOT
2700 [facility] [phone] 1400 [address] [place] [address] 
[phone] Patient: Gary Kohler MRN: [id number] ZHW:[id number] Visit Information Date & Time Provider Department Dept. Phone Encounter #  
 5/22/2018  2:00 PM [facility] 219 [id number] Follow-up Instructions Return in about 8 months (around 1/22/2019) for FIbroScan. Upcoming Health Maintenance Date Due Hepatitis C Screening 1950 DTaP/Tdap/Td series (1 - Tdap) 2/20/1972 FOBT Q 1 YEAR AGE 50-75 2/20/2001 ZOSTER VACCINE AGE 60> 12/20/2010 GLAUCOMA SCREENING Q2Y 2/20/2016 Pneumococcal 65+ Low/Medium Risk (1 of 2 - PCV13) 2/20/2016 MEDICARE YEARLY EXAM 5/11/2018 Influenza Age 5 to Adult 8/1/2018 Allergies as of 5/22/2018  Review Complete On: 5/22/2018 By: Trena Andrews NP No Known Allergies Current Immunizations  Never Reviewed No immunizations on file. Not reviewed this visit You Were Diagnosed With   
  
 Codes Comments Autoimmune sclerosing pancreatitis ([facility] [address]    -  Primary ICD-10-CM: K86.1 ICD-9-CM: 577.8 Autoimmune hepatitis ([facility] [address]     ICD-10-CM: K75.4 ICD-9-CM: 571.42 Vitals BP Pulse Temp Weight(growth percentile) SpO2 BMI  
 112/70 (BP 1 Location: Right arm, BP Patient Position: Sitting) 75 96.8 °F (36 °C) (Tympanic) 122 lb (55.3 kg) 99% 23.05 kg/m2 Smoking Status Never Smoker BMI and BSA Data Body Mass Index Body Surface Area 23.05 kg/m 2 1.54 m 2 [facility] [address], [address] [phone] Your Updated Medication List  
  
   
This list is accurate as of 5/22/18  2:32 PM.  Always use your most recent med list.  
  
  
  
  
 CALCIUM 600 + D 600-125 mg-unit Tab Generic drug:  calcium-cholecalciferol (d3) Take  by mouth. FISH  mg Cap Generic drug:  Omega-3 Fatty Acids Take  by mouth. [address] Take  by mouth. tacrolimus 1 mg capsule Commonly known as:  PROGRAF Take 1 Cap by mouth every twelve (12) hours. 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 To-Do List   
 05/22/2018 Lab:  CBC W/O DIFF   
  
 05/22/2018 Lab:  METABOLIC PANEL, COMPREHENSIVE   
  
 05/22/2018 Lab:  TACROLIMUS, WHOLE BLOOD   
  
 01/23/2019 10:30 AM  
  Appointment with Trena Nevarez NP at [place] [address] ([phone]) Patient Instructions FIBROSCAN PATIENT INFORMATION What is Fibroscan:? 
 
Fibroscan is an ultrasound device that measures liver stiffness by sending a pulse of vibrations through the liver. This translated into an immediate result that can help your healthcare team determine the level of damage to the liver as well as monitor the condition of various liver diseases over time. Fibroscan is helpful in the evaluation of the following conditions: 
 
Chronic Hepatitis C Chronic Hepatitis B Fatty Liver Disease Alcohol Liver Disease Chronic Cholestatic Liver Diseases What happens During the Scan? Patients receiving this exam lie flat on an examination table and raise the right arm above the head. The skin over the right lower rib cage is exposed and the examiner locates the correct area to be scanned. The prove of the scanner is placed directly on the patient and triggered to start. This fells like a gentle flick against the skin and should not be uncomfortable. At least ten (10) readings are taken and the average is calculated to score the amount of liver stiffness or scar tissue. The exam should take 10-20 minutes. What do I need to do to prepare for the scan? Please do not eat or drink anything 2-4 hours  Before your Fibroscan. You should continue taking any prescribed medication and can take small sips of water or clear fluid to do so,  But avoid drinking large amounts of fluid. Please dress comfortably in clothes that will allow for easy access to the right side of the abdomen. Women are discouraged from wearing a dress on the day of the exam. 
 
Are there any special precautions? Patients who are pregnant or have an implantable device (for example, pacemaker or defibrillator) should not have this exam performed. Patients with a significant amount of fat tissue in the area the probe is pressed may be unable to have test performed. Introducing [facility] SERVICES! Dear Rony Antoine: Thank you for requesting a "[employer]" account. Our records indicate that you have previously registered for a "[employer]" account but its currently inactive. Please call our "[employer]" support line at [phone]. Additional Information If you have questions, please visit the Frequently Asked Questions section of the "[employer]" website at [email or web address] Remember, "[employer]" is NOT to be used for urgent needs. For medical emergencies, dial 911. Now available from your iPhone and Android! Please provide this summary of care documentation to your next provider. Your primary care clinician is listed as Katie Recio. If you have any questions after today's visit, please call [phone].

## 2018-05-22 NOTE — PROGRESS NOTES
1. Have you been to the ER, urgent care clinic since your last visit? Hospitalized since your last visit? No    2. Have you seen or consulted any other health care providers outside of the [address] [place] [address] since your last visit? Include any pap smears or colon screening. No    Chief Complaint   Patient presents with    Follow-up     Visit Vitals    /70 (BP 1 Location: Right arm, BP Patient Position: Sitting)    Pulse 75    Temp 96.8 °F (36 °C) (Tympanic)    Wt 122 lb (55.3 kg)    SpO2 99%    BMI 23.05 kg/m2     PHQ over the last two weeks 5/22/2018   Little interest or pleasure in doing things Not at all   Feeling down, depressed or hopeless Not at all   Total Score PHQ 2 0     Abuse Screening Questionnaire 5/22/2018   Do you ever feel afraid of your partner? N   Are you in a relationship with someone who physically or mentally threatens you? N   Is it safe for you to go home?  iMah Loco

## 2018-09-25 ENCOUNTER — ANESTHESIA EVENT (OUTPATIENT)
Dept: ENDOSCOPY | Age: 68
End: 2018-09-25
Payer: COMMERCIAL

## 2018-09-25 NOTE — ANESTHESIA PREPROCEDURE EVALUATION
Anesthetic History No history of anesthetic complications Review of Systems / Medical History Patient summary reviewed, nursing notes reviewed and pertinent labs reviewed Pulmonary Within defined limits Neuro/Psych Within defined limits Cardiovascular Within defined limits GI/Hepatic/Renal 
  
 
Hepatitis Liver disease Comments: pancreatitis Endo/Other Within defined limits Other Findings Physical Exam 
 
Airway Mallampati: II 
TM Distance: > 6 cm Neck ROM: normal range of motion Mouth opening: Normal 
 
 Cardiovascular Regular rate and rhythm,  S1 and S2 normal,  no murmur, click, rub, or gallop Dental 
No notable dental hx Pulmonary Breath sounds clear to auscultation Abdominal 
GI exam deferred Other Findings Anesthetic Plan ASA: 2 Anesthesia type: MAC Induction: Intravenous Anesthetic plan and risks discussed with: Patient

## 2018-09-26 ENCOUNTER — APPOINTMENT (OUTPATIENT)
Dept: GENERAL RADIOLOGY | Age: 68
End: 2018-09-26
Attending: INTERNAL MEDICINE
Payer: COMMERCIAL

## 2018-09-26 ENCOUNTER — ANESTHESIA (OUTPATIENT)
Dept: ENDOSCOPY | Age: 68
End: 2018-09-26
Payer: COMMERCIAL

## 2018-09-26 ENCOUNTER — HOSPITAL ENCOUNTER (OUTPATIENT)
Age: 68
Setting detail: OUTPATIENT SURGERY
Discharge: HOME | End: 2018-09-26
Attending: INTERNAL MEDICINE | Admitting: INTERNAL MEDICINE
Payer: COMMERCIAL

## 2018-09-26 VITALS
DIASTOLIC BLOOD PRESSURE: 89 MMHG | RESPIRATION RATE: 15 BRPM | TEMPERATURE: 97.9 F | SYSTOLIC BLOOD PRESSURE: 135 MMHG | OXYGEN SATURATION: 100 % | HEART RATE: 60 BPM

## 2018-09-26 PROCEDURE — 74011250636 HC RX REV CODE- 250/636: Performed by: INTERNAL MEDICINE

## 2018-09-26 PROCEDURE — 74011250636 HC RX REV CODE- 250/636

## 2018-09-26 PROCEDURE — 74011636320 HC RX REV CODE- 636/320: Performed by: INTERNAL MEDICINE

## 2018-09-26 PROCEDURE — 77030013992 HC SNR POLYP ENDOSC BSC -B: Performed by: INTERNAL MEDICINE

## 2018-09-26 PROCEDURE — 76060000032 HC ANESTHESIA 0.5 TO 1 HR: Performed by: INTERNAL MEDICINE

## 2018-09-26 PROCEDURE — 76040000007: Performed by: INTERNAL MEDICINE

## 2018-09-26 PROCEDURE — 77030012596 HC SPHNTOM BILI BSC -E: Performed by: INTERNAL MEDICINE

## 2018-09-26 PROCEDURE — 77030009038 HC CATH BILI STN RTVR BSC -C: Performed by: INTERNAL MEDICINE

## 2018-09-26 PROCEDURE — 77030007288 HC DEV LOK BILI BSC -A: Performed by: INTERNAL MEDICINE

## 2018-09-26 RX ORDER — ATROPINE SULFATE 0.1 MG/ML
0.5 INJECTION INTRAVENOUS
Status: DISCONTINUED | OUTPATIENT
Start: 2018-09-26 | End: 2018-09-26 | Stop reason: HOSPADM

## 2018-09-26 RX ORDER — DEXTROMETHORPHAN/PSEUDOEPHED 2.5-7.5/.8
1.2 DROPS ORAL
Status: DISCONTINUED | OUTPATIENT
Start: 2018-09-26 | End: 2018-09-26 | Stop reason: HOSPADM

## 2018-09-26 RX ORDER — LIDOCAINE HYDROCHLORIDE 20 MG/ML
INJECTION, SOLUTION EPIDURAL; INFILTRATION; INTRACAUDAL; PERINEURAL AS NEEDED
Status: DISCONTINUED | OUTPATIENT
Start: 2018-09-26 | End: 2018-09-26 | Stop reason: HOSPADM

## 2018-09-26 RX ORDER — SODIUM CHLORIDE 9 MG/ML
50 INJECTION, SOLUTION INTRAVENOUS CONTINUOUS
Status: DISCONTINUED | OUTPATIENT
Start: 2018-09-26 | End: 2018-09-26 | Stop reason: HOSPADM

## 2018-09-26 RX ORDER — LEVOFLOXACIN 5 MG/ML
500 INJECTION, SOLUTION INTRAVENOUS ONCE
Status: COMPLETED | OUTPATIENT
Start: 2018-09-26 | End: 2018-09-26

## 2018-09-26 RX ORDER — FENTANYL CITRATE 50 UG/ML
100 INJECTION, SOLUTION INTRAMUSCULAR; INTRAVENOUS
Status: DISCONTINUED | OUTPATIENT
Start: 2018-09-26 | End: 2018-09-26 | Stop reason: HOSPADM

## 2018-09-26 RX ORDER — MIDAZOLAM HYDROCHLORIDE 1 MG/ML
.25-1 INJECTION, SOLUTION INTRAMUSCULAR; INTRAVENOUS
Status: DISCONTINUED | OUTPATIENT
Start: 2018-09-26 | End: 2018-09-26 | Stop reason: HOSPADM

## 2018-09-26 RX ORDER — FLUMAZENIL 0.1 MG/ML
0.2 INJECTION INTRAVENOUS
Status: DISCONTINUED | OUTPATIENT
Start: 2018-09-26 | End: 2018-09-26 | Stop reason: HOSPADM

## 2018-09-26 RX ORDER — SODIUM CHLORIDE 0.9 % (FLUSH) 0.9 %
5-10 SYRINGE (ML) INJECTION AS NEEDED
Status: DISCONTINUED | OUTPATIENT
Start: 2018-09-26 | End: 2018-09-26 | Stop reason: HOSPADM

## 2018-09-26 RX ORDER — PROPOFOL 10 MG/ML
INJECTION, EMULSION INTRAVENOUS AS NEEDED
Status: DISCONTINUED | OUTPATIENT
Start: 2018-09-26 | End: 2018-09-26 | Stop reason: HOSPADM

## 2018-09-26 RX ORDER — SODIUM CHLORIDE 9 MG/ML
INJECTION, SOLUTION INTRAVENOUS
Status: DISCONTINUED | OUTPATIENT
Start: 2018-09-26 | End: 2018-09-26 | Stop reason: HOSPADM

## 2018-09-26 RX ORDER — NALOXONE HYDROCHLORIDE 0.4 MG/ML
0.4 INJECTION, SOLUTION INTRAMUSCULAR; INTRAVENOUS; SUBCUTANEOUS
Status: DISCONTINUED | OUTPATIENT
Start: 2018-09-26 | End: 2018-09-26 | Stop reason: HOSPADM

## 2018-09-26 RX ORDER — SODIUM CHLORIDE 0.9 % (FLUSH) 0.9 %
5-10 SYRINGE (ML) INJECTION EVERY 8 HOURS
Status: DISCONTINUED | OUTPATIENT
Start: 2018-09-26 | End: 2018-09-26 | Stop reason: HOSPADM

## 2018-09-26 RX ORDER — EPINEPHRINE 0.1 MG/ML
1 INJECTION INTRACARDIAC; INTRAVENOUS
Status: DISCONTINUED | OUTPATIENT
Start: 2018-09-26 | End: 2018-09-26 | Stop reason: HOSPADM

## 2018-09-26 RX ADMIN — PROPOFOL 50 MG: 10 INJECTION, EMULSION INTRAVENOUS at 08:22

## 2018-09-26 RX ADMIN — LIDOCAINE HYDROCHLORIDE 100 MG: 20 INJECTION, SOLUTION EPIDURAL; INFILTRATION; INTRACAUDAL; PERINEURAL at 08:15

## 2018-09-26 RX ADMIN — LEVOFLOXACIN 500 MG: 5 INJECTION, SOLUTION INTRAVENOUS at 09:12

## 2018-09-26 RX ADMIN — PROPOFOL 50 MG: 10 INJECTION, EMULSION INTRAVENOUS at 08:24

## 2018-09-26 RX ADMIN — PROPOFOL 50 MG: 10 INJECTION, EMULSION INTRAVENOUS at 08:15

## 2018-09-26 RX ADMIN — PROPOFOL 50 MG: 10 INJECTION, EMULSION INTRAVENOUS at 08:27

## 2018-09-26 RX ADMIN — SODIUM CHLORIDE: 9 INJECTION, SOLUTION INTRAVENOUS at 08:09

## 2018-09-26 RX ADMIN — PROPOFOL 50 MG: 10 INJECTION, EMULSION INTRAVENOUS at 08:18

## 2018-09-26 NOTE — ANESTHESIA POSTPROCEDURE EVALUATION
Post-Anesthesia Evaluation and Assessment Patient: Abida Burkett MRN: [id number]  SSN: [id number] YOB: 1950  Age: 80 y.o. Sex: male Cardiovascular Function/Vital Signs Visit Vitals  /89  Pulse 60  Temp 36.6 °C (97.9 °F)  Resp 15  SpO2 100% Patient is status post MAC anesthesia for Procedure(s): ENDOSCOPIC RETROGRADE CHOLANGIOPANCREATOGRAPHY (ERCP) ENDOSCOPIC FOREIGN BODY REMOVAL 
ENDOSCOPIC STONE EXTRACTION/BALLOON SWEEP. Nausea/Vomiting: None Postoperative hydration reviewed and adequate. Pain: 
Pain Scale 1: Numeric (0 - 10) (09/26/18 7663) Pain Intensity 1: 0 (09/26/18 1963) Managed Neurological Status: At baseline Mental Status and Level of Consciousness: Arousable Pulmonary Status:  
O2 Device: Room air (09/26/18 0945) Adequate oxygenation and airway patent Complications related to anesthesia: None Post-anesthesia assessment completed. No concerns Signed By: Awilda Torres MD   
 September 26, 2018

## 2018-09-26 NOTE — PROCEDURES
118 [facility].  217 [facility] 140 [address], [address]  [phone]                   ERCP NOTE    NAME:  Renee Whiteside   :   1950   MRN:   [id number]     Date/Time:  2018 8:31 AM    Procedure Type:   ERCPwith biliary stent removal, biliary sludge removal     Indications: biliary obstruction  Pre-operative Diagnosis: see indication above  Post-operative Diagnosis:  See findings below  : Roxanne Gamboa MD    Referring Provider:    Casie Stover MD, Emerson Nguyen MD    Sedation:  MAC anesthesia    Procedure Details:  After informed consent was obtained with all risks and benefits of procedure explained, the patient was taken to the fluoroscopy suite and placed in the prone position. Upon sequential sedation as per above, the Olympus duodenoscope [id number]   was inserted via the mouthpeice and carefully advanced to the second portion of the duodenum. The quality of visualization was good. The duodenoscope was withdrawn into a short position. Findings:   Esophagus: not examined in detail  Stomach: not examined in detail  Duodenum/jejunum: normal  Ampulla:protruding stent  Mucosa appeared scaly at the ampulla  Cholangiogram: One plastic bile duct stent was removed using a snare. Bile duct was then cannulated using a Dreamwire. Contrast was injected. Mild stenosis was noted at the ampullary level. No definite mass was seen. Small 5 mm filing defect was seen in distal bile duct. Balloon sweep with 9-12 mm yelded small amount of sludge. Contrast was draining smoothly and balloon came out smoothly without much resistance. Pancreatogram: not performed    Specimen Removed: * No specimens in log *    Complications: None. EBL:  None. Interventions:    Pancreatic: none  Biliary: One plastic bile duct stent was removed using a snare. Bile duct was then cannulated using a Dreamwire. Contrast was injected. Mild stenosis was noted at the ampullary level.  No definite mass was seen. Small 5 mm filing defect was seen in distal bile duct. Balloon sweep with 9-12 mm yelded small amount of sludge. Contrast was draining smoothly and balloon came out smoothly without much resistance. Impression:    -Bile duct stricture-resolved  -Biliary sludge-extracted    Recommendations:    Clear liquid diet and advance as tolerated. Resume normal medication(s).     Levaquin 500 mg 1 daily for 7 days  Watch for fever, pain, jaundice  May need metallic stent if stricture recurs      Discharge Disposition:  [facility] following recovery in Endoscopy    Dot Russell MD  9/26/2018  8:31 AM

## 2018-09-26 NOTE — IP AVS SNAPSHOT
Adrianva [address] [place] [address] 
[phone] Patient: Kaya Maldonado MRN: [id number] QUH:8/69/1756 About your hospitalization You were admitted on:  September 26, 2018 You last received care in the:  [facility] ENDOSCOPY You were discharged on:  September 26, 2018 Why you were hospitalized Your primary diagnosis was:  Not on File Follow-up Information None Discharge Orders None A check abdullahi indicates which time of day the medication should be taken. My Medications CONTINUE taking these medications Instructions Each Dose to Equal  
 Morning Noon Evening Bedtime CALCIUM 600 + D 600-125 mg-unit Tab Generic drug:  calcium-cholecalciferol (d3) Your last dose was: Your next dose is: Take  by mouth. FISH  mg Cap Generic drug:  Omega-3 Fatty Acids Your last dose was: Your next dose is: Take  by mouth. [address] Your last dose was: Your next dose is: Take  by mouth. tacrolimus 1 mg capsule Commonly known as:  PROGRAF Your last dose was: Your next dose is: Take 1 Cap by mouth every twelve (12) hours. 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 
217 [facility] [address] [place] [address],  [address] 
[phone] DISCHARGE INSTRUCTIONS Kaya Maldonado 
[id number] 
1950 DISCOMFORT: 
Sore throat- throat lozenges or warm salt water gargle 
redness at IV site- apply warm compress to area; if redness or soreness persist- contact your physician Gaseous discomfort- walking, belching will help relieve any discomfort You may not operate a vehicle for 12 hours You may not engage in an occupation involving machinery or appliances for rest of today You may not drink alcoholic beverages for at least 12 hours Avoid making any critical decisions for at least 24 hour DIET You may eat and drink after you leave. You may resume your regular diet  however -  remember your colon is empty and a heavy meal will produce gas. Avoid these foods:  vegetables, fried / greasy foods, carbonated drinks ACTIVITY You may resume your normal daily activities Spend the remainder of the day resting -  avoid any strenuous activity. CALL M.D. ANY SIGN OF Increasing pain, nausea, vomiting Abdominal distension (swelling) New increased bleeding (oral or rectal) Fever (chills) Pain in chest area Bloody discharge from nose or mouth Shortness of breath Follow-up Instructions: 
 Call Dr. Bob Moeller for any questions or problems. If we took a biopsy please call the office within 2 weeks to discuss your                             pathology results. Telephone # [phone] Continue same medications. ENDOSCOPY FINDINGS: 
 Your endoscopy showed bile duct stricture-resolved. Applied [employer] Activation Thank you for requesting access to [employer]. Please follow the instructions below to securely access and download your online medical record. [employer] allows you to send messages to your doctor, view your test results, renew your prescriptions, schedule appointments, and more. How Do I Sign Up? 1. In your internet browser, go to [email or web address] 
2. Click on the First Time User? Click Here link in the Sign In box. You will be redirect to the New Member Sign Up page. 3. Enter your [employer] Access Code exactly as it appears below. You will not need to use this code after youve completed the sign-up process. If you do not sign up before the expiration date, you must request a new code.  
 
[employer] Access Code: [email or web address] 
 Expires: 2018  9:30 AM (This is the date your [facility] access code will ) 4. Enter the last four digits of your Social Security Number (xxxx) and Date of Birth (mm/dd/yyyy) as indicated and click Submit. You will be taken to the next sign-up page. 5. Create a SwipeStationt ID. This will be your [facility] login ID and cannot be changed, so think of one that is secure and easy to remember. 6. Create a [facility] password. You can change your password at any time. 7. Enter your Password Reset Question and Answer. This can be used at a later time if you forget your password. 8. Enter your e-mail address. You will receive e-mail notification when new information is available in [address] 9. Click Sign Up. You can now view and download portions of your medical record. 10. Click the Download Summary menu link to download a portable copy of your medical information. Additional Information If you have questions, please visit the Frequently Asked Questions section of the [facility] website at [email or web address] Remember, [facility] is NOT to be used for urgent needs. For medical emergencies, dial 911. Introducing [facility]! [employer] introduces [facility] patient portal. Now you can access parts of your medical record, email your doctor's office, and request medication refills online. 1. In your internet browser, go to [email or web address] 2. Click on the First Time User? Click Here link in the Sign In box. You will see the New Member Sign Up page. 3. Enter your [facility] Access Code exactly as it appears below. You will not need to use this code after youve completed the sign-up process. If you do not sign up before the expiration date, you must request a new code. · [facility] Access Code: [email or web address] Expires: 2018  9:30 AM 
 
4.  Enter the last four digits of your Social Security Number (xxxx) and Date of Birth (mm/dd/yyyy) as indicated and click Submit. You will be taken to the next sign-up page. 5. Create a Ambit Biosciencest ID. This will be your [employer] login ID and cannot be changed, so think of one that is secure and easy to remember. 6. Create a [employer] password. You can change your password at any time. 7. Enter your Password Reset Question and Answer. This can be used at a later time if you forget your password. 8. Enter your e-mail address. You will receive e-mail notification when new information is available in [address] 9. Click Sign Up. You can now view and download portions of your medical record. 10. Click the Download Summary menu link to download a portable copy of your medical information. If you have questions, please visit the Frequently Asked Questions section of the [employer] website. Remember, [employer] is NOT to be used for urgent needs. For medical emergencies, dial 911. Now available from your iPhone and Android! Introducing Baldemar Fraga As a [facility] patient, I wanted to make you aware of our electronic visit tool called Baldemar Fraga. [facility] 24/7 allows you to connect within minutes with a medical provider 24 hours a day, seven days a week via a mobile device or tablet or logging into a secure website from your computer. You can access Baldemar Fraga from anywhere in the [place]. A virtual visit might be right for you when you have a simple condition and feel like you just dont want to get out of bed, or cant get away from work for an appointment, when your regular [facility] provider is not available (evenings, weekends or holidays), or when youre out of town and need minor care. Electronic visits cost only $49 and if the [facility] 24/7 provider determines a prescription is needed to treat your condition, one can be electronically transmitted to a nearby pharmacy*. Please take a moment to enroll today if you have not already done so. The enrollment process is free and takes just a few minutes. To enroll, please download the [employer] 24/7 azalia to your tablet or phone, or visit [email or web address] to enroll on your computer. And, as an [address] [place] [address] patient with a [employer] account, the results of your visits will be scanned into your electronic medical record and your primary care provider will be able to view the scanned results. We urge you to continue to see your regular [employer] provider for your ongoing medical care. And while your primary care provider may not be the one available when you seek a [employer] virtual visit, the peace of mind you get from getting a real diagnosis real time can be priceless. For more information on Vonvo[employer], view our Frequently Asked Questions (FAQs) at [email or web address]. Sincerely, 
 
Agus Owens MD 
Chief Medical Officer [facility] Yumiko Mays *:  certain medications cannot be prescribed via Vonvo.com Unresulted Labs-Please follow up with your PCP about these lab tests Order Current Status XR ENDO ERCP COMP BILIARY PANC SI In process Providers Seen During Your Hospitalization Provider Specialty Primary office phone Josue Hernández MD Gastroenterology [phone] Your Primary Care Physician (PCP) Primary Care Physician Office Phone Office Fax Tre Espitia [phone] You are allergic to the following No active allergies Recent Documentation Smoking Status Never Smoker Emergency Contacts Name Discharge Info Relation Home Work Mobile JoselineGiorgio PIPPA CAREGIVER [3] Child [2] [phone] Prisca Villegas CAREGIVER [3] Spouse [3] [phone] Patient Belongings The following personal items are in your possession at time of discharge: 
  Dental Appliances: None  Visual Aid: Glasses Please provide this summary of care documentation to your next provider. Signatures-by signing, you are acknowledging that this After Visit Summary has been reviewed with you and you have received a copy. Patient Signature:  ____________________________________________________________ Date:  ____________________________________________________________  
  
Isabella Sleight Provider Signature:  ____________________________________________________________ Date:  ____________________________________________________________

## 2018-09-26 NOTE — IP AVS SNAPSHOT
[address] 
[phone] Patient: Antwan Zimmer MRN: [id number] IHT:6/52/7104 A check abdullahi indicates which time of day the medication should be taken. My Medications CONTINUE taking these medications Instructions Each Dose to Equal  
 Morning Noon Evening Bedtime CALCIUM 600 + D 600-125 mg-unit Tab Generic drug:  calcium-cholecalciferol (d3) Your last dose was: Your next dose is: Take  by mouth. FISH  mg Cap Generic drug:  Omega-3 Fatty Acids Your last dose was: Your next dose is: Take  by mouth. [address] Your last dose was: Your next dose is: Take  by mouth. tacrolimus 1 mg capsule Commonly known as:  PROGRAF Your last dose was: Your next dose is: Take 1 Cap by mouth every twelve (12) hours. 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

## 2018-09-26 NOTE — H&P
118 [facility]. 
217 [facility] [address] [place], [address]  
[phone] History and Physical  
 
NAME: Aurelia Soares :  1950 MRN:  [id number] HPI:  The patient was seen and examined. Past Surgical History:  
Procedure Laterality Date  ABDOMEN SURGERY PROC UNLISTED    
 hernia repair  HX OTHER SURGICAL Surgury on testicles Past Medical History:  
Diagnosis Date  Liver disease   
 cholangitis Social History Substance Use Topics  Smoking status: Never Smoker  Smokeless tobacco: Never Used  Alcohol use No  
 
No Known Allergies Family History Problem Relation Age of Onset  Cancer Mother  Cancer Sister  Cancer Brother Current Facility-Administered Medications Medication Dose Route Frequency  glucagon (GLUCAGEN) injection 1 mg  1 mg IntraVENous ONCE PHYSICAL EXAM: 
General: WD, WN. Alert, cooperative, no acute distress   
HEENT: NC, Atraumatic. PERRLA, EOMI. Anicteric sclerae. Lungs:  CTA Bilaterally. No Wheezing/Rhonchi/Rales. Heart:  Regular  rhythm,  No murmur, No Rubs, No Gallops Abdomen: Soft, Non distended, Non tender.  +Bowel sounds, no HSM Extremities: No c/c/e Neurologic:  CN 2-12 gi, Alert and oriented X 3. No acute neurological distress Psych:   Good insight. Not anxious nor agitated. The heart, lungs and mental status were satisfactory for the administration of MAC sedation and for the procedure. Mallampati score: 3 Assessment:  
· Bile duct stricture Plan:  
· Endoscopic procedure · MAC sedation ·

## 2018-09-26 NOTE — DISCHARGE INSTRUCTIONS
118 [facility]  217 [facility] 7300 [facility], [address]  [phone]                     DISCHARGE INSTRUCTIONS    Edilia Payne  [id number]  1950    DISCOMFORT:  Sore throat- throat lozenges or warm salt water gargle  redness at IV site- apply warm compress to area; if redness or soreness persist- contact your physician  Gaseous discomfort- walking, belching will help relieve any discomfort  You may not operate a vehicle for 12 hours  You may not engage in an occupation involving machinery or appliances for rest of today  You may not drink alcoholic beverages for at least 12 hours  Avoid making any critical decisions for at least 24 hour  DIET  You may eat and drink after you leave. You may resume your regular diet - however -  remember your colon is empty and a heavy meal will produce gas. Avoid these foods:  vegetables, fried / greasy foods, carbonated drinks    ACTIVITY  You may resume your normal daily activities   Spend the remainder of the day resting -  avoid any strenuous activity. CALL M.D. ANY SIGN OF   Increasing pain, nausea, vomiting  Abdominal distension (swelling)  New increased bleeding (oral or rectal)  Fever (chills)  Pain in chest area  Bloody discharge from nose or mouth  Shortness of breath    Follow-up Instructions:   Call Dr. Karina Cortes for any questions or problems. If we took a biopsy please call the office within 2 weeks to discuss your                             pathology results. Telephone # [phone]        Continue same medications. ENDOSCOPY FINDINGS:   Your endoscopy showed bile duct stricture-resolved. LastRoom Activation    Thank you for requesting access to LastRoom. Please follow the instructions below to securely access and download your online medical record. LastRoom allows you to send messages to your doctor, view your test results, renew your prescriptions, schedule appointments, and more. How Do I Sign Up? 1.  In your internet browser, go to [email or web address]  2. Click on the First Time User? Click Here link in the Sign In box. You will be redirect to the New Member Sign Up page. 3. Enter your [employer] Access Code exactly as it appears below. You will not need to use this code after youve completed the sign-up process. If you do not sign up before the expiration date, you must request a new code. [employer] Access Code: Richrd Paget  Expires: 2018  9:30 AM (This is the date your [employer] access code will )    4. Enter the last four digits of your Social Security Number (xxxx) and Date of Birth (mm/dd/yyyy) as indicated and click Submit. You will be taken to the next sign-up page. 5. Create a Mixx ID. This will be your [employer] login ID and cannot be changed, so think of one that is secure and easy to remember. 6. Create a Mixx password. You can change your password at any time. 7. Enter your Password Reset Question and Answer. This can be used at a later time if you forget your password. 8. Enter your e-mail address. You will receive e-mail notification when new information is available in [address] 9. Click Sign Up. You can now view and download portions of your medical record. 10. Click the Download Summary menu link to download a portable copy of your medical information. Additional Information    If you have questions, please visit the Frequently Asked Questions section of the [employer] website at [email or web address] Remember, [employer] is NOT to be used for urgent needs. For medical emergencies, dial 911.

## 2018-09-26 NOTE — ROUTINE PROCESS
Leatha Dos Santos 
1950 
[id number] Situation: 
Verbal report received from: [facility] Procedure: Procedure(s): ENDOSCOPIC RETROGRADE CHOLANGIOPANCREATOGRAPHY (ERCP) ENDOSCOPIC FOREIGN BODY REMOVAL 
ENDOSCOPIC STONE EXTRACTION/BALLOON SWEEP Background: 
 
Preoperative diagnosis: COMMON BILE DUCT CALCULUS, AUTOIMMUNE CHRONIC PANCREATITIS Postoperative diagnosis: 1. Bililary Stricture resolved 2. Stent Removal 
3. Balloon Sweep :  Dr. Dea Martinez 
Assistant(s): Endoscopy Technician-1: Rimma Jerez Endoscopy RN-1: Alonso Merlos Specimens: * No specimens in log * H. Pylori  no Assessment: 
Intra-procedure medications Anesthesia gave intra-procedure sedation and medications, see anesthesia flow sheet yes Intravenous fluids: NS@ Donnamae Days Vital signs stable Abdominal assessment: round and soft Recommendation: 
Discharge patient per MD order. Family or Friend Spouse Permission to share finding with family or friend yes

## 2019-01-02 DIAGNOSIS — K86.1 AUTOIMMUNE SCLEROSING PANCREATITIS (HCC): ICD-10-CM

## 2019-01-02 DIAGNOSIS — K75.4 AUTOIMMUNE HEPATITIS (HCC): Primary | ICD-10-CM

## 2019-01-15 LAB
ALBUMIN SERPL-MCNC: 4.8 G/DL (ref 3.6–4.8)
ALBUMIN/GLOB SERPL: 1.8 {RATIO} (ref 1.2–2.2)
ALP SERPL-CCNC: 85 IU/L (ref 39–117)
ALT SERPL-CCNC: 17 IU/L (ref 0–44)
AST SERPL-CCNC: 21 IU/L (ref 0–40)
BILIRUB SERPL-MCNC: 0.4 MG/DL (ref 0–1.2)
BUN SERPL-MCNC: 20 MG/DL (ref 8–27)
BUN/CREAT SERPL: 21 (ref 10–24)
CALCIUM SERPL-MCNC: 9.3 MG/DL (ref 8.6–10.2)
CHLORIDE SERPL-SCNC: 103 MMOL/L (ref 96–106)
CO2 SERPL-SCNC: 20 MMOL/L (ref 20–29)
CREAT SERPL-MCNC: 0.94 MG/DL (ref 0.76–1.27)
ERYTHROCYTE [DISTWIDTH] IN BLOOD BY AUTOMATED COUNT: 13.7 % (ref 12.3–15.4)
GLOBULIN SER CALC-MCNC: 2.6 G/DL (ref 1.5–4.5)
GLUCOSE SERPL-MCNC: 111 MG/DL (ref 65–99)
HCT VFR BLD AUTO: 38.4 % (ref 37.5–51)
HGB BLD-MCNC: 12.6 G/DL (ref 13–17.7)
MCH RBC QN AUTO: 30.2 PG (ref 26.6–33)
MCHC RBC AUTO-ENTMCNC: 32.8 G/DL (ref 31.5–35.7)
MCV RBC AUTO: 92 FL (ref 79–97)
PLATELET # BLD AUTO: 268 X10E3/UL (ref 150–379)
POTASSIUM SERPL-SCNC: 4.6 MMOL/L (ref 3.5–5.2)
PROT SERPL-MCNC: 7.4 G/DL (ref 6–8.5)
RBC # BLD AUTO: 4.17 X10E6/UL (ref 4.14–5.8)
SODIUM SERPL-SCNC: 140 MMOL/L (ref 134–144)
SPECIMEN STATUS REPORT, ROLRST: NORMAL
TACROLIMUS, 700249: 5.2 NG/ML (ref 2–20)
WBC # BLD AUTO: 7.2 X10E3/UL (ref 3.4–10.8)

## 2019-01-23 ENCOUNTER — OFFICE VISIT (OUTPATIENT)
Dept: HEMATOLOGY | Age: 69
End: 2019-01-23

## 2019-01-23 VITALS
TEMPERATURE: 98 F | SYSTOLIC BLOOD PRESSURE: 137 MMHG | BODY MASS INDEX: 23.81 KG/M2 | OXYGEN SATURATION: 98 % | HEART RATE: 63 BPM | DIASTOLIC BLOOD PRESSURE: 88 MMHG | WEIGHT: 126 LBS

## 2019-01-23 DIAGNOSIS — K86.1 AUTOIMMUNE SCLEROSING PANCREATITIS (HCC): ICD-10-CM

## 2019-01-23 DIAGNOSIS — K76.0 FATTY LIVER DISEASE, NONALCOHOLIC: ICD-10-CM

## 2019-01-23 DIAGNOSIS — K75.4 AUTOIMMUNE HEPATITIS (HCC): Primary | ICD-10-CM

## 2019-01-23 RX ORDER — TACROLIMUS 1 MG/1
CAPSULE ORAL
Qty: 135 CAP | Refills: 3 | Status: SHIPPED | OUTPATIENT
Start: 2019-01-23 | End: 2019-12-17 | Stop reason: SDUPTHER

## 2019-01-23 NOTE — PROGRESS NOTES
1. Have you been to the ER, urgent care clinic since your last visit? Hospitalized since your last visit? No    2. Have you seen or consulted any other health care providers outside of the [facility] since your last visit? Include any pap smears or colon screening. No   Chief Complaint   Patient presents with    Follow-up     Fibroscan      Visit Vitals  /88 (BP 1 Location: Right arm, BP Patient Position: Sitting)   Pulse 63   Temp 98 °F (36.7 °C) (Tympanic)   Wt 126 lb (57.2 kg)   SpO2 98%   BMI 23.81 kg/m²     PHQ over the last two weeks 1/23/2019   Little interest or pleasure in doing things Not at all   Feeling down, depressed, irritable, or hopeless Not at all   Total Score PHQ 2 0     Learning Assessment 1/23/2019   PRIMARY LEARNER Patient   BARRIERS PRIMARY LEARNER NONE   CO-LEARNER CAREGIVER No   PRIMARY LANGUAGE ENGLISH   LEARNER PREFERENCE PRIMARY LISTENING   ANSWERED BY patient   RELATIONSHIP SELF     Abuse Screening Questionnaire 1/23/2019   Do you ever feel afraid of your partner? N   Are you in a relationship with someone who physically or mentally threatens you? N   Is it safe for you to go home? Y     Fall Risk Assessment, last 12 mths 1/23/2019   Able to walk? Yes   Fall in past 12 months?  No

## 2019-01-23 NOTE — PROGRESS NOTES
[address] Marybel Kirkpatrick MD, [address], Cite [facility], [place]       Fredia Gram, NP Brooke Goodell, PA-C Lea Shutter, HIWOT-BC   RONI Murphy NP        at [facility]     [address], [address] [address]     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 The active problem list, all pertinent past medical history, medications, endoscopic studies, radiologic findings and laboratory findings related to the liver disorder were reviewed with the patient. The patient also has autoimmune pancreatitis. Both autoimmune diseases were treated with prednisone and Imuran. However, whenever the prednisone dose is lowered, both the autoimmune hepatitis and pancreatitis flare. He was started on tacrolimus in 1/2016. He was instructed to discontinue prednisone and Imuran. He is tolerating tacrolimus well. His liver enzymes and function have both remained normal with his current dose. 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 Repeat Fibroscans annually have demonstrated portal fibrosis/mild liver disease after successful treatment of AIH. Patient presents to the clinic today for another Fibroscan to reassess liver fibrosis. MRI of the liver was performed in 11/2015. There was a stricture of the common bile duct.  Dr. Marah Ma treated this with a stent but it did not remain open so he placed a metal stent in the PD in 2016. In 2017, Dr. Gutierrez Stephens went back in and placed another stent and removed a bile duct stone with no complications. In 2018, Dr. Gutierrez Stephens successfully removed the biliary stent and some sludge. The patient feels well today and has no new physical complaints. He has ongoing gas and occasional abdominal discomfort. Probiotic improves his symptoms. The patient has no limitations in functional activities which can be attributed to the liver disease. Today, patient denies change in bowel habits, dark urine, myalgias, arthralgias, pruritus and problems concentrating. ALLERGIES:  No Known Allergies    MEDICATIONS  Current Outpatient Medications   Medication Sig    calcium-cholecalciferol, d3, (CALCIUM 600 + D) 600-125 mg-unit tab Take  by mouth.  Omega-3 Fatty Acids (FISH OIL) 300 mg cap Take  by mouth.  GLUCOSAM/CHOND/HYALU/CF BORATE (MOVE FREE 4-Tell PO) Take  by mouth.  tacrolimus (PROGRAF) 1 mg capsule Take 1 Cap by mouth every twelve (12) hours. 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 SYSTEM REVIEW NOT RELATED TO LIVER DISEASE OR REVIEWED ABOVE:  Constitution systems: Negative for fever, chills, weight gain, weight loss. Eyes: Negative for visual changes. ENT: Negative for sore throat, painful swallowing. Respiratory: Negative for cough, hemoptysis, SOB. Cardiology: Negative for chest pain, palpitations. GI:  Negative for constipation or diarrhea. : Negative for urinary frequency, dysuria, hematuria, nocturia. Skin: Negative for rash. Hematology: Negative for easy bruising, blood clots. Musculo-skelatal: Negative for back pain, muscle pain, weakness. Neurologic: Negative for headaches, dizziness, vertigo, memory problems not related to HE. Psychology: Negative for anxiety, depression. FAMILY HISTORY:  The father  of colon cancer.     The mother has the following chronic diseases: has an autoimmune disease. There is no family history of liver disease. There is a family history of autoimmune diseases. SOCIAL HISTORY:  The patient is . The patient has 2 children. The patient has never used tobacco products. The patient has never consumed significant amounts of alcohol. The patient currently works full time in the [employer] at [employer]. PHYSICAL EXAMINATION:  Visit Vitals  /88 (BP 1 Location: Right arm, BP Patient Position: Sitting)   Pulse 63   Temp 98 °F (36.7 °C) (Tympanic)   Wt 126 lb (57.2 kg)   SpO2 98%   BMI 23.81 kg/m²     General: No acute distress. Eyes: Sclera anicteric. ENT: No oral lesions. Thyroid normal.  Nodes: No adenopathy. Skin: No spider angiomata. No jaundice. No palmar erythema. Respiratory: Lungs clear to auscultation. Cardiovascular: Regular heart rate. No murmurs. No JVD. Abdomen: Soft non-tender. Liver size normal to percussion/palpation. Spleen not palpable. No obvious ascites. Extremities: No edema. No muscle wasting. No gross arthritic changes. Neurologic: Alert and oriented. Cranial nerves grossly intact. No asterixsis.     LABORATORY STUDIES:  Liver [place] of [id number] & Units 1/14/2019 5/15/2018   WBC 3.4 - 10.8 x10E3/uL 7.2 7.3   ANC x10E3/uL     HGB 13.0 - 17.7 g/dL 12.6 (L) 11.7 (L)    - 379 x10E3/uL 268 256   AST 0 - 40 IU/L 21 18   ALT 0 - 44 IU/L 17 14   Alk Phos 39 - 117 IU/L 85 70   Bili, Total 0.0 - 1.2 mg/dL 0.4 0.4   Bili, Direct 0.00 - 0.40 mg/dL     Albumin 3.6 - 4.8 g/dL 4.8 4.6   BUN 8 - 27 mg/dL 20 20   Creat 0.76 - 1.27 mg/dL 0.94 0.90   Na 134 - 144 mmol/L 140 140   K 3.5 - 5.2 mmol/L 4.6 4.4   Cl 96 - 106 mmol/L 103 104   CO2 20 - 29 mmol/L 20 21   Glucose 65 - 99 mg/dL 111 (H) 106 (H)     Liver Virology and Transplant Immune Suppression Tacrolimus Level   Latest Ref Rng & Units 2.0 - 20.0 ng/mL   1/14/2019 5.2   5/15/2018 3.5 11/29/2016 8.3   8/9/2016 9.9   4/29/2016 7.0   3/25/2016 21.4 (HH)     SEROLOGIES:  10/2013. HBsAntigen negative, anti-HBcore positive, anti-HBsurface positive, HBE antigen negative, Anti-HBE positive,HBV DNA undetectable, Ferritin 797,     Serologies Latest Ref Rng 6/3/2014   APRIL, IFA  See patterns   APRIL Pattern  1:160 (H)   C-ANCA Neg:<1:20 titer <1:20   P-ANCA Neg:<1:20 titer <1:20   ANCA Neg:<1:20 titer <1:20   ASMCA 0 - 19 Units 25 (H)   M2 Ab 0.0 - 20.0 Units 3.8     LIVER HISTOLOGY:  2013. Reviewed by MLS. Moderate inflammation with stage 3 fibrosis. 8/2016. FibroScan performed at [address]. EkPa was 7.4. Suggested fibrosis level is F1.  11/2017. FibroScan performed at [address]. EkPa was 8.1. Suggested fibrosis level is F1.  1/2019. FibroScan performed at [address]. EkPa was 6.3. Suggested fibrosis level is F1. CAP score is 330, suggestive of significant steatosis. ENDOSCOPIC PROCEDURES:  5/2013. EGD by Dr Marjan Grove. Gastritis and small gastric ulcer. 7/2013. EGD by Dr Marjan Grove. Healed gastritis and .  12/2015. ERCP. Common bile duct stricture. Stent placed. 4/2016. ERCP by Dr West Perez. Pancreatic stricture. Covered metal stent placed. 5/2018. ERCP by Dr. West Perez. Bile duct stricture-stent changed. Abnormal mucosa ampulla-biopsied  9/2018. ERCP by Dr. West Perez. Bile duct stricture-resolved. Biliary sludge-extracted. RADIOLOGY:  10/2013. MRI of liver. Diffuse swelling of pancreas with peripancreatic edema. Encasement of CDB by edematous pancreas head. Normal appearing liver. No liver mass lesions. Normal spleen. No dilated bile ducts. No bile duct strictures. No ascites. 8/2014. MRI of liver. Short yet severe stricture of the common duct within the pancreatic head without associated pancreatic ductal or intrahepatic biliary dilatation. There is questionable subtle decreased contrast enhancement within the pancreatic head.  ERCP with endoscopic ultrasound suggested for further evaluation. 2015. MRCP. Obstruction of CBD in region of pennie hepatitis. Distal CBD is normal. Proximal to obstruction the intrahepatic bile ducts are dilated. 2017. ERCP By Dr. Drew Garcia. Bile duct stricture and stented. Bile duct stone-extracted. OTHER TESTIN2013.  17. ASSESSMENT AND PLAN:  Autoimmune hepatitis with bridging fibrosis in . Repeat FibroScans have demonstrated improvement in his liver disease after treatment with a fibrosis score of stage 1 out of 4. He will continue his current regimen. This has been effective and is well tolerated. Discussed with patient today the importance of continuing this regimen long-term. Fibrosis will be checked annually. Liver enzymes and liver function remain within normal range. Fatty liver. FibroScan today suggested significant steatosis within the liver. Results were discussed in detail with patient. Encouraged patient to follow a healthy diet and remain active. Will obtain blood work a few days prior to the next appointment in 6-8 months. This will include tacrolimus blood level (patient instructed to hold tacrolimus until after lab work) and LFTs. This was given to patient today. The patient was directed to continue all current medications at the current dosages. There are no contraindications for the patient to take any medications that are necessary for treatment of other medical issues. All of the above issues were discussed with the patient. All questions were answered. The patient expressed a clear understanding of the above. [facility] 87 in 8 months with FibroScan.     Josiah Heck NP  Liver [place] of [address], [address]  Ph: [phone]  Fax: [phone]

## 2019-07-24 LAB
ALBUMIN SERPL-MCNC: 4.8 G/DL (ref 3.6–4.8)
ALBUMIN/GLOB SERPL: 1.9 {RATIO} (ref 1.2–2.2)
ALP SERPL-CCNC: 84 IU/L (ref 39–117)
ALT SERPL-CCNC: 13 IU/L (ref 0–44)
AST SERPL-CCNC: 17 IU/L (ref 0–40)
BILIRUB SERPL-MCNC: 0.5 MG/DL (ref 0–1.2)
BUN SERPL-MCNC: 19 MG/DL (ref 8–27)
BUN/CREAT SERPL: 19 (ref 10–24)
CALCIUM SERPL-MCNC: 9.3 MG/DL (ref 8.6–10.2)
CHLORIDE SERPL-SCNC: 104 MMOL/L (ref 96–106)
CO2 SERPL-SCNC: 24 MMOL/L (ref 20–29)
CREAT SERPL-MCNC: 1 MG/DL (ref 0.76–1.27)
ERYTHROCYTE [DISTWIDTH] IN BLOOD BY AUTOMATED COUNT: 14 % (ref 12.3–15.4)
GLOBULIN SER CALC-MCNC: 2.5 G/DL (ref 1.5–4.5)
GLUCOSE SERPL-MCNC: 95 MG/DL (ref 65–99)
HCT VFR BLD AUTO: 40.5 % (ref 37.5–51)
HGB BLD-MCNC: 12.4 G/DL (ref 13–17.7)
MCH RBC QN AUTO: 28.4 PG (ref 26.6–33)
MCHC RBC AUTO-ENTMCNC: 30.6 G/DL (ref 31.5–35.7)
MCV RBC AUTO: 93 FL (ref 79–97)
PLATELET # BLD AUTO: 282 X10E3/UL (ref 150–450)
POTASSIUM SERPL-SCNC: 4.9 MMOL/L (ref 3.5–5.2)
PROT SERPL-MCNC: 7.3 G/DL (ref 6–8.5)
RBC # BLD AUTO: 4.36 X10E6/UL (ref 4.14–5.8)
SODIUM SERPL-SCNC: 142 MMOL/L (ref 134–144)
SPECIMEN STATUS REPORT, ROLRST: NORMAL
WBC # BLD AUTO: 8.4 X10E3/UL (ref 3.4–10.8)

## 2019-07-25 LAB — TACROLIMUS, 700249: 8.4 NG/ML (ref 2–20)

## 2019-07-31 ENCOUNTER — OFFICE VISIT (OUTPATIENT)
Dept: HEMATOLOGY | Age: 69
End: 2019-07-31

## 2019-07-31 VITALS
OXYGEN SATURATION: 92 % | DIASTOLIC BLOOD PRESSURE: 78 MMHG | SYSTOLIC BLOOD PRESSURE: 122 MMHG | BODY MASS INDEX: 23.43 KG/M2 | HEART RATE: 65 BPM | WEIGHT: 124 LBS | TEMPERATURE: 98.1 F

## 2019-07-31 DIAGNOSIS — K86.1 AUTOIMMUNE SCLEROSING PANCREATITIS (HCC): ICD-10-CM

## 2019-07-31 DIAGNOSIS — K75.4 AUTOIMMUNE HEPATITIS (HCC): Primary | ICD-10-CM

## 2019-07-31 NOTE — PROGRESS NOTES
[address] [facility], MD, [address], Cite Teddy Marin, MD Marisa Vazquez PA-JEREMIAH Lobo, [facility]-BC     Trena RICCI Aneta, [facility]   Chetna Arzola, P-JEREMIAH Ramirezjuana Guillenez, [facility]       Leroy Skaggsgigi [facility] 136    at [address], [address] [address].    [phone]    FAX: [address] [place] [address], [address], [address]    [phone]    FAX: [phone]     Patient Care Team:  Elizabeth Allen MD as PCP - General (Internal Medicine)  Elisha Oconnor MD (Gastroenterology)  Gladis Cabrera MD (Gastroenterology)    Patient Active Problem List   Diagnosis Code    Autoimmune sclerosing pancreatitis [facility]) K86.1    Autoimmune hepatitis [facility]) K75.4     Son Flaco Horn and wife are present at visit to help with translation, although patient's English is excellent. Sameer Drew returns to the [place] [address] for management of autoimmune hepatitis. The active problem list, all pertinent past medical history, medications, endoscopic studies, radiologic findings and laboratory findings related to the liver disorder were reviewed with the patient. The patient also has autoimmune pancreatitis. Both autoimmune diseases were treated with prednisone and Imuran in the past, however when the prednisone dose was lowered, both the autoimmune hepatitis and pancreatitis would flare. He was started on tacrolimus in 1/2016 and with this medication, he has had great response and has been able to discontinue prednisone and Imuran. He is tolerating tacrolimus well with alternate day dosing 1 mg daily for two days and 2 mg.  His liver enzymes and function have both remained normal with his current dose in reviewing labs from last week.    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 Repeat Fibroscans annually have demonstrated portal fibrosis/mild liver disease after successful treatment of AIH. MRI of the liver was performed in 11/2015. There was a stricture of the common bile duct. Dr. Ladi Cortez treated this with a stent but it did not remain open so he placed a metal stent in the PD in 4/2016. In October 2017, Dr. Ladi Cortez went back in and placed another stent and removed a bile duct stone with no complications. In September 2018, Dr. Ladi Cortez successfully removed the biliary stent and some sludge, no additional stenting done at that time. Patient has been doing well without GI symptoms for the past 8-9 months. He has had no indication for return office evaluation with Dr Ladi Cortez. He reports that while visiting in [facility] in 6/2019, he had an MRI which showed no new concern, but there was some suggestion of what sounds like pancreatic atrophy. He has thus far had no issues with glucose intolerance/DM. The patient feels well today and has no new physical complaints. He has ongoing gas and occasional abdominal discomfort. Probiotic improves his symptoms. The patient has no limitations in functional activities which can be attributed to the liver disease. Today, patient denies change in bowel habits, dark urine, myalgias, arthralgias, pruritus and problems concentrating. ALLERGIES:  No Known Allergies    MEDICATIONS  Current Outpatient Medications   Medication Sig    Lactobacillus acidophilus (PROBIOTIC PO) Take  by mouth.  tacrolimus (PROGRAF) 1 mg capsule Take 1 tab every other day; 2 tabs every other day.  calcium-cholecalciferol, d3, (CALCIUM 600 + D) 600-125 mg-unit tab Take  by mouth.  Omega-3 Fatty Acids (FISH OIL) 300 mg cap Take  by mouth.  GLUCOSAM/CHOND/HYALU/CF BORATE (MOVE FREE [employer] PO) Take  by mouth. No current facility-administered medications for this visit.       SYSTEM REVIEW NOT RELATED TO LIVER DISEASE OR REVIEWED ABOVE:  Constitution systems: Negative for fever, chills, weight gain, weight loss. Eyes: Negative for visual changes. ENT: Negative for sore throat, painful swallowing. Respiratory: Negative for cough, hemoptysis, SOB. Cardiology: Negative for chest pain, palpitations. GI:  Negative for constipation or diarrhea. : Negative for urinary frequency, dysuria, hematuria, nocturia. Skin: Negative for rash. Hematology: Negative for easy bruising, blood clots. Musculo-skeletal: Negative for back pain, muscle pain, weakness. Neurologic: Negative for headaches, dizziness, vertigo, memory problems not related to HE. Psychology: Negative for anxiety, depression. FAMILY HISTORY:  The father  of colon cancer. The mother has the following chronic diseases: has an autoimmune disease. There is no family history of liver disease. There is a family history of autoimmune diseases. SOCIAL HISTORY:  The patient is . The patient has 2 children. The patient has never used tobacco products. The patient has never consumed significant amounts of alcohol. The patient currently works full time in the [employer] at [employer]. PHYSICAL EXAMINATION:  Visit Vitals  /78 (BP 1 Location: Right arm, BP Patient Position: Sitting)   Pulse 65   Temp 98.1 °F (36.7 °C) (Tympanic)   Wt 124 lb (56.2 kg)   SpO2 92%   BMI 23.43 kg/m²     General: No acute distress. Eyes: Sclera anicteric. ENT: No oral lesions. Thyroid normal.  Nodes: No adenopathy. Skin: No spider angiomata. No jaundice. No palmar erythema. Respiratory: Lungs clear to auscultation. Cardiovascular: Regular heart rate. No murmurs. No JVD. Abdomen: Soft non-tender. Liver size normal to percussion/palpation. Spleen not palpable. No obvious ascites. Extremities: No edema. No muscle wasting. No gross arthritic changes. Neurologic: Alert and oriented. Cranial nerves grossly intact.  No spencer. LABORATORY STUDIES:  Liver [place] of [id number]  [address] Units 7/23/2019 1/14/2019   WBC 3.4 - 10.8 x10E3/uL 8.4 7.2   ANC x10E3/uL     HGB 13.0 - 17.7 g/dL 12.4 (L) 12.6 (L)    - 450 x10E3/uL 282 268   AST 0 - 40 IU/L 17 21   ALT 0 - 44 IU/L 13 17   Alk Phos 39 - 117 IU/L 84 85   Bili, Total 0.0 - 1.2 mg/dL 0.5 0.4   Bili, Direct 0.00 - 0.40 mg/dL     Albumin 3.6 - 4.8 g/dL 4.8 4.8   BUN 8 - 27 mg/dL 19 20   Creat 0.76 - 1.27 mg/dL 1.00 0.94   Na 134 - 144 mmol/L 142 140   K 3.5 - 5.2 mmol/L 4.9 4.6   Cl 96 - 106 mmol/L 104 103   CO2 20 - 29 mmol/L 24 20   Glucose 65 - 99 mg/dL 95 111 (H)   Magnesium 1.6 - 2.4 mg/dL       Liver [place] [address] Ref Rng & Units 5/15/2018   WBC 3.4 - 10.8 x10E3/uL 7.3   ANC x10E3/uL    HGB 13.0 - 17.7 g/dL 11.7 (L)    - 450 x10E3/uL 256   AST 0 - 40 IU/L 18   ALT 0 - 44 IU/L 14   Alk Phos 39 - 117 IU/L 70   Bili, Total 0.0 - 1.2 mg/dL 0.4   Bili, Direct 0.00 - 0.40 mg/dL    Albumin 3.6 - 4.8 g/dL 4.6   BUN 8 - 27 mg/dL 20   Creat 0.76 - 1.27 mg/dL 0.90   Na 134 - 144 mmol/L 140   K 3.5 - 5.2 mmol/L 4.4   Cl 96 - 106 mmol/L 104   CO2 20 - 29 mmol/L 21   Glucose 65 - 99 mg/dL 106 (H)   Magnesium 1.6 - 2.4 mg/dL      Liver Virology and Transplant Immune Suppression Latest Ref Rng & Units 7/23/2019   Tacrolimus Level 2.0 - 20.0 ng/mL 8.4     Liver Virology and Transplant Immune Suppression Latest Ref Rng & Units 1/14/2019   Tacrolimus Level 2.0 - 20.0 ng/mL 5.2     Liver Virology and Transplant Immune Suppression Latest Ref Rng & Units 5/15/2018   Tacrolimus Level 2.0 - 20.0 ng/mL 3.5     SEROLOGIES:  10/2013. HBsAntigen negative, anti-HBcore positive, anti-HBsurface positive, HBE antigen negative, Anti-HBE positive,HBV DNA undetectable, Ferritin 797.     Serologies Latest Ref Rng 6/3/2014   APRIL, IFA  See patterns   APRIL Pattern  1:160 (H)   C-ANCA Neg:<1:20 titer <1:20   P-ANCA Neg:<1:20 titer <1:20   ANCA Neg:<1:20 titer <1:20   ASMCA 0 - 19 Units 25 (H)   M2 Ab 0.0 - 20.0 Units 3.8     LIVER HISTOLOGY:  2013. Reviewed by MLS. Moderate inflammation with stage 3 fibrosis. 8/2016. FibroScan performed at [address]. EkPa was 7.4. Suggested fibrosis level is F1.  11/2017. FibroScan performed at [address]. EkPa was 8.1. Suggested fibrosis level is F1.  1/2019. FibroScan performed at [address]. EkPa was 6.3. Suggested fibrosis level is F1. CAP score is 330, suggestive of significant steatosis. ENDOSCOPIC PROCEDURES:  5/2013. EGD by Dr Oralia Longo. Gastritis and small gastric ulcer. 7/2013. EGD by Dr Oralia Longo. Healed gastritis and .  12/2015. ERCP. Common bile duct stricture. Stent placed. 4/2016. ERCP by Dr J Luis Black. Pancreatic stricture. Covered metal stent placed. 11/2017. ERCP By Dr. J Luis Black. Bile duct stricture and stented. Bile duct stone-extracted. 5/2018. ERCP by Dr. J Luis Black. Bile duct stricture-stent changed. Abnormal mucosa ampulla-biopsied  9/2018. ERCP by Dr. J Luis Black. Bile duct stricture-resolved. Biliary sludge-extracted. RADIOLOGY:  10/2013. MRI of liver. Diffuse swelling of pancreas with peripancreatic edema. Encasement of CDB by edematous pancreas head. Normal appearing liver. No liver mass lesions. Normal spleen. No dilated bile ducts. No bile duct strictures. No ascites. 8/2014. MRI of liver. Short yet severe stricture of the common duct within the pancreatic head without associated pancreatic ductal or intrahepatic biliary dilatation. There is questionable subtle decreased contrast enhancement within the pancreatic head. ERCP with endoscopic ultrasound suggested for further evaluation. 11/2015. MRCP. Obstruction of CBD in region of pennie hepatitis. Distal CBD is normal. Proximal to obstruction the intrahepatic bile ducts are dilated. 6/2019. MRI performed in [facility]. Per patient no findings, possible pancreatic atrophy.     OTHER TESTING:  Cancer Screening Latest Ref Rng & Units 10/11/2017 6/3/2014   CA 19-9 0 - 35 U/mL 8 69 (H)   CEA ng/mL 0.5    CEA 0.0 - 4.7 ng/mL  1.2   7/2013.  17. ASSESSMENT AND PLAN:  Autoimmune hepatitis with bridging fibrosis in 2013. Repeat FibroScans have demonstrated improvement in his liver disease after treatment with a fibrosis score of stage 1 out of 4. Recent labs have continued to show normal liver enzymes and a slightly high tac level. I have asked him to decrease the dose of tac to 1 mg daily and we will repeat lab values in 6 weeks, lab slip given to patient. Fibrosis will be checked annually and I have asked for this to be scheduled at his next office visit follow-up. Liver enzymes and liver function remain within normal range. Biliary obstruction. Removal of stent by Dr Stephen Castro in 9/2018 and no further indication for replacement at present. Patient's recent labs and symptoms indicate stability. Will defer any additional evaluation with Dr Stephen Castro pending patients symptoms. The patient was directed to continue all current medications at the current dosages. There are no contraindications for the patient to take any medications that are necessary for treatment of other medical issues. All of the above issues were discussed with the patient. All questions were answered. The patient expressed a clear understanding of the above. [address] [facility] 87 in 4-5 months with FibroScan. Recheck of labs in 6 weeks locally.     Paula Benson PA-C  Liver [place] of [address] [place] [address], [address] [facility], [facility] 22.  [phone]

## 2019-07-31 NOTE — PROGRESS NOTES
1. Have you been to the ER, urgent care clinic since your last visit? Hospitalized since your last visit? No    2. Have you seen or consulted any other health care providers outside of the [address] [place] [address] since your last visit? Include any pap smears or colon screening. No   Chief Complaint   Patient presents with    Follow-up     Visit Vitals  /78 (BP 1 Location: Right arm, BP Patient Position: Sitting)   Pulse 65   Temp 98.1 °F (36.7 °C) (Tympanic)   Wt 124 lb (56.2 kg)   SpO2 92%   BMI 23.43 kg/m²     3 most recent PHQ Screens 7/31/2019   Little interest or pleasure in doing things Not at all   Feeling down, depressed, irritable, or hopeless Not at all   Total Score PHQ 2 0     Fall Risk Assessment, last 12 mths 7/31/2019   Able to walk? Yes   Fall in past 12 months? No     Abuse Screening Questionnaire 7/31/2019   Do you ever feel afraid of your partner? N   Are you in a relationship with someone who physically or mentally threatens you? N   Is it safe for you to go home?  Chiki Martinez

## 2019-09-16 DIAGNOSIS — K75.4 AUTOIMMUNE HEPATITIS (HCC): ICD-10-CM

## 2019-09-16 DIAGNOSIS — K86.1 AUTOIMMUNE SCLEROSING PANCREATITIS (HCC): ICD-10-CM

## 2019-12-11 LAB — TACROLIMUS, 700249: 5.8 NG/ML (ref 2–20)

## 2019-12-17 ENCOUNTER — OFFICE VISIT (OUTPATIENT)
Dept: HEMATOLOGY | Age: 69
End: 2019-12-17

## 2019-12-17 VITALS
OXYGEN SATURATION: 100 % | DIASTOLIC BLOOD PRESSURE: 72 MMHG | BODY MASS INDEX: 23.37 KG/M2 | SYSTOLIC BLOOD PRESSURE: 118 MMHG | TEMPERATURE: 96.5 F | WEIGHT: 123.8 LBS | HEIGHT: 61 IN | HEART RATE: 59 BPM

## 2019-12-17 DIAGNOSIS — K75.4 AUTOIMMUNE HEPATITIS (HCC): ICD-10-CM

## 2019-12-17 RX ORDER — TACROLIMUS 1 MG/1
CAPSULE ORAL
Qty: 90 CAP | Refills: 3 | Status: SHIPPED | OUTPATIENT
Start: 2019-12-17 | End: 2020-12-15 | Stop reason: SDUPTHER

## 2019-12-17 NOTE — PROGRESS NOTES
Chase Landry is a 77 y.o. male  Chief Complaint   Patient presents with    Other     fibroscan         Visit Vitals  /72 (BP 1 Location: Left arm, BP Patient Position: Sitting)   Pulse (!) 59   Temp 96.5 °F (35.8 °C) (Tympanic)   Ht 5' 1\" (1.549 m)   Wt 123 lb 12.8 oz (56.2 kg)   SpO2 100%   BMI 23.39 kg/m²     3 most recent PHQ Screens 7/31/2019   Little interest or pleasure in doing things Not at all   Feeling down, depressed, irritable, or hopeless Not at all   Total Score PHQ 2 0     Learning Assessment 7/31/2019   PRIMARY LEARNER Patient   BARRIERS PRIMARY LEARNER [address] CAREGIVER Yes   CO-LEARNER NAME jacky tong   PRIMARY LANGUAGE CHINESE (MANDARIN)   LEARNER PREFERENCE PRIMARY LISTENING   ANSWERED BY patient   RELATIONSHIP SELF     Abuse Screening Questionnaire 7/31/2019   Do you ever feel afraid of your partner? N   Are you in a relationship with someone who physically or mentally threatens you? N   Is it safe for you to go home? Y         1. Have you been to the ER, urgent care clinic since your last visit? Hospitalized since your last visit? No    2. Have you seen or consulted any other health care providers outside of the [address] [place] [address] since your last visit? Include any pap smears or colon screening.  No

## 2019-12-17 NOTE — PROGRESS NOTES
[address] [facility], MD, [address], Cite Wes Colon MD [employer], MATTY Haro, [facility]     Trena Cornell, [facility]   Horace Stevens, [facility]    Misa Keyshahamzah, [facility]       Leroyjoseluis SkaggsDr. [facility] Arturo De Bradford 136    at [address], [address] Dre Sellers  22.    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) K75.4     Son Haley Veras and wife are present at visit to help with translation, although patient's English is excellent. Natalee Salinas returns to the [place] [id number] for management of autoimmune hepatitis. The active problem list, all pertinent past medical history, medications, endoscopic studies, radiologic findings and laboratory findings related to the liver disorder were reviewed with the patient. The patient also has autoimmune pancreatitis. Both autoimmune diseases were treated with prednisone and Imuran in the past, however when the prednisone dose was lowered, both the autoimmune hepatitis and pancreatitis would flare. He was started on tacrolimus in 1/2016 and with this medication, he has had great response and has been able to discontinue prednisone and Imuran. He is tolerating tacrolimus well with single 1 mg daily administration. His liver enzymes and function have both remained normal with his current dose in reviewing labs from last week.     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 Repeat Fibroscans annually have demonstrated portal fibrosis/mild liver disease after successful treatment of AIH. MRI of the liver was performed in 11/2015. There was a stricture of the common bile duct. Dr. Walker Randolph treated this with a stent but it did not remain open so he placed a metal stent in the PD in 4/2016. In October 2017, Dr. Walker Randolph went back in and placed another stent and removed a bile duct stone with no complications. In September 2018, Dr. Walker Randolph successfully removed the biliary stent and some sludge, no additional stenting done at that time and he has had no additional symptoms at present. He reports that while visiting in [facility] in 6/2019, he had an MRI which showed no new concern, but there was some suggestion of what sounds like pancreatic atrophy. He has thus far had no issues with glucose intolerance/DM. The patient feels well today and has no new physical complaints. He has ongoing gas and occasional abdominal discomfort. He thinks that this is related to certain foods causing heartburn symptoms. He remains on a probiotic for gas/bloating symptoms. The patient has no limitations in functional activities which can be attributed to the liver disease. Today, patient denies change in bowel habits, dark urine, myalgias, arthralgias, pruritus and problems concentrating. ALLERGIES:  No Known Allergies    MEDICATIONS  Current Outpatient Medications   Medication Sig    Lactobacillus acidophilus (PROBIOTIC PO) Take  by mouth.  tacrolimus (PROGRAF) 1 mg capsule Take 1 tab every other day; 2 tabs every other day.  calcium-cholecalciferol, d3, (CALCIUM 600 + D) 600-125 mg-unit tab Take  by mouth.  Omega-3 Fatty Acids (FISH OIL) 300 mg cap Take  by mouth.  GLUCOSAM/CHOND/HYALU/CF BORATE (Lumicity PO) Take  by mouth. No current facility-administered medications for this visit.       SYSTEM REVIEW NOT RELATED TO LIVER DISEASE OR REVIEWED ABOVE:  Constitution systems: Negative for fever, chills, weight gain, weight loss. Eyes: Negative for visual changes. ENT: Negative for sore throat, painful swallowing. Respiratory: Negative for cough, hemoptysis, SOB. Cardiology: Negative for chest pain, palpitations. GI:  Negative for constipation or diarrhea. Occasional heartburn symptoms. : Negative for urinary frequency, dysuria, hematuria, nocturia. Skin: Negative for rash. Hematology: Negative for easy bruising, blood clots. Musculo-skeletal: Negative for back pain, muscle pain, weakness. Neurologic: Negative for headaches, dizziness, vertigo, memory problems not related to HE. Psychology: Negative for anxiety, depression. FAMILY HISTORY:  The father  of colon cancer. The mother has the following chronic diseases: has an autoimmune disease. There is no family history of liver disease. There is a family history of autoimmune diseases. SOCIAL HISTORY:  The patient is . The patient has 2 children. The patient has never used tobacco products. The patient has never consumed significant amounts of alcohol. The patient currently works full time in the [employer]. PHYSICAL EXAMINATION:  Visit Vitals  /72 (BP 1 Location: Left arm, BP Patient Position: Sitting)   Pulse (!) 59   Temp 96.5 °F (35.8 °C) (Tympanic)   Ht 5' 1\" (1.549 m)   Wt 123 lb 12.8 oz (56.2 kg)   SpO2 100%   BMI 23.39 kg/m²     General: No acute distress. Eyes: Sclera anicteric. ENT: No oral lesions. Thyroid normal.  Nodes: No adenopathy. Skin: No spider angiomata. No jaundice. No palmar erythema. Respiratory: Lungs clear to auscultation. Cardiovascular: Regular heart rate. No murmurs. No JVD. Abdomen: Soft non-tender. Liver size normal to percussion/palpation. Spleen not palpable. No obvious ascites. Extremities: No edema. No muscle wasting. No gross arthritic changes.   Neurologic: Alert and oriented. Cranial nerves grossly intact. No asterixis. LABORATORY STUDIES:  From 12/2019  AST/ALT/ALP/T Bili/ALB: 21/13/93/0.5/5.1  WBC/HB/PLT/INR:5.8/13.5/299  BUN/CREAT:20/0.99  Tac: 5.8    [facility] of [address]  [address] Units 7/23/2019 1/14/2019   WBC 3.4 - 10.8 x10E3/uL 8.4 7.2   ANC x10E3/uL     HGB 13.0 - 17.7 g/dL 12.4 (L) 12.6 (L)    - 450 x10E3/uL 282 268   AST 0 - 40 IU/L 17 21   ALT 0 - 44 IU/L 13 17   Alk Phos 39 - 117 IU/L 84 85   Bili, Total 0.0 - 1.2 mg/dL 0.5 0.4   Bili, Direct 0.00 - 0.40 mg/dL     Albumin 3.6 - 4.8 g/dL 4.8 4.8   BUN 8 - 27 mg/dL 19 20   Creat 0.76 - 1.27 mg/dL 1.00 0.94   Na 134 - 144 mmol/L 142 140   K 3.5 - 5.2 mmol/L 4.9 4.6   Cl 96 - 106 mmol/L 104 103   CO2 20 - 29 mmol/L 24 20   Glucose 65 - 99 mg/dL 95 111 (H)   Magnesium 1.6 - 2.4 mg/dL       [facility] [place] of [address] [place] [address] Ref Rng & Units 5/15/2018   WBC 3.4 - 10.8 x10E3/uL 7.3   ANC x10E3/uL    HGB 13.0 - 17.7 g/dL 11.7 (L)    - 450 x10E3/uL 256   AST 0 - 40 IU/L 18   ALT 0 - 44 IU/L 14   Alk Phos 39 - 117 IU/L 70   Bili, Total 0.0 - 1.2 mg/dL 0.4   Bili, Direct 0.00 - 0.40 mg/dL    Albumin 3.6 - 4.8 g/dL 4.6   BUN 8 - 27 mg/dL 20   Creat 0.76 - 1.27 mg/dL 0.90   Na 134 - 144 mmol/L 140   K 3.5 - 5.2 mmol/L 4.4   Cl 96 - 106 mmol/L 104   CO2 20 - 29 mmol/L 21   Glucose 65 - 99 mg/dL 106 (H)   Magnesium 1.6 - 2.4 mg/dL      Liver Virology and Transplant Immune Suppression Latest Ref Rng & Units 12/9/2019   Tacrolimus Level 2.0 - 20.0 ng/mL 5.8     Liver Virology and Transplant Immune Suppression Latest Ref Rng & Units 7/23/2019   Tacrolimus Level 2.0 - 20.0 ng/mL 8.4     Liver Virology and Transplant Immune Suppression Latest Ref Rng & Units 1/14/2019   Tacrolimus Level 2.0 - 20.0 ng/mL 5.2       SEROLOGIES:  10/2013. HBsAntigen negative, anti-HBcore positive, anti-HBsurface positive, HBE antigen negative, Anti-HBE positive,HBV DNA undetectable, Ferritin 797.     Serologies Latest Ref Rng 6/3/2014   APRIL, IFA  See patterns   APRIL Pattern  1:160 (H)   C-ANCA Neg:<1:20 titer <1:20   P-ANCA Neg:<1:20 titer <1:20   ANCA Neg:<1:20 titer <1:20   ASMCA 0 - 19 Units 25 (H)   M2 Ab 0.0 - 20.0 Units 3.8     LIVER HISTOLOGY:  2013. Reviewed by MLS. Moderate inflammation with stage 3 fibrosis. 8/2016. Fib[facility] performed at [facility]. EkPa was 7.4. Suggested fibrosis level is F1.  11/2017. FibroScan performed at [facility]. EkPa was 8.1. Suggested fibrosis level is F1.  1/2019. FibroScan performed at [facility]. EkPa was 6.3. Suggested fibrosis level is F1. CAP score is 330, suggestive of significant steatosis. 12/2019. FibroScan performed at [facility]. EkPa was 6.6. Suggested fibrosis level is F0-1. CAP score is 286, this is consistent with steatosis. ENDOSCOPIC PROCEDURES:  5/2013. EGD by Dr Mary Ann Riley. Gastritis and small gastric ulcer. 7/2013. EGD by Dr Mary Ann Riley. Healed gastritis and .  12/2015. ERCP. Common bile duct stricture. Stent placed. 4/2016. ERCP by Dr Liseth Fernández. Pancreatic stricture. Covered metal stent placed. 11/2017. ERCP By Dr. Liseth Fernández. Bile duct stricture and stented. Bile duct stone-extracted. 5/2018. ERCP by Dr. Liseth Fernández. Bile duct stricture-stent changed. Abnormal mucosa ampulla-biopsied  9/2018. ERCP by Dr. Liseth Fernández. Bile duct stricture-resolved. Biliary sludge-extracted. RADIOLOGY:  10/2013. MRI of liver. Diffuse swelling of pancreas with peripancreatic edema. Encasement of CDB by edematous pancreas head. Normal appearing liver. No liver mass lesions. Normal spleen. No dilated bile ducts. No bile duct strictures. No ascites. 8/2014. MRI of liver. Short yet severe stricture of the common duct within the pancreatic head without associated pancreatic ductal or intrahepatic biliary dilatation. There is questionable subtle decreased contrast enhancement within the pancreatic head.  ERCP with endoscopic ultrasound suggested for further evaluation. 11/2015. MRCP. Obstruction of CBD in region of pennie hepatitis. Distal CBD is normal. Proximal to obstruction the intrahepatic bile ducts are dilated. 6/2019. MRI performed in [facility]. Per patient no findings, possible pancreatic atrophy. OTHER TESTING:  Cancer Screening Latest Ref Rng & Units 10/11/2017 6/3/2014   CA 19-9 0 - 35 U/mL 8 69 (H)   CEA ng/mL 0.5    CEA 0.0 - 4.7 ng/mL  1.2   7/2013.  17. ASSESSMENT AND PLAN:  Autoimmune hepatitis with bridging fibrosis in 2013. Repeat FibroScans have demonstrated improvement/stabilization in his liver disease after treatment with a fibrosis score of stage 1 out of 4. Recent labs have continued to show normal liver enzymes. I have asked him to maintain the dose of tac to 1 mg daily. We may at some point consider further reduction, but as he has been stable for both the liver and pancreatic values, will maintain this dosing for now. Fibrosis will be checked annually. Liver enzymes and liver function remain within normal range. Biliary obstruction. Removal of stent by Dr Wade Eaton in 9/2018 and no further indication for replacement at present. Patient's recent labs and symptoms indicate stability. Will defer any additional evaluation with Dr Wade Eaton pending patient's symptoms. The patient was directed to continue all current medications at the current dosages. There are no contraindications for the patient to take any medications that are necessary for treatment of other medical issues. All of the above issues were discussed with the patient. All questions were answered. The patient expressed a clear understanding of the above. [facility] 87 in 5 months.     Jenelle Garrett PA-C  [facility] [place] of [address] [place] [address], [address] Dre Sellers  22.  [phone]

## 2020-06-17 DIAGNOSIS — K75.4 AUTOIMMUNE HEPATITIS (HCC): ICD-10-CM

## 2020-10-26 ENCOUNTER — DOCUMENTATION ONLY (OUTPATIENT)
Dept: HEMATOLOGY | Age: 70
End: 2020-10-26

## 2020-10-26 NOTE — PROGRESS NOTES
Gregorio Bradley is a 72 y.o. male  HIPAA verified by two patient identifiers. Patient requesting labs prior to appointment on Dec 15,2020,outstanding labs mailed to patient home.

## 2020-10-28 DIAGNOSIS — K75.4 AUTOIMMUNE HEPATITIS (HCC): Primary | ICD-10-CM

## 2020-12-08 LAB
ALBUMIN SERPL-MCNC: 4.6 G/DL (ref 3.8–4.8)
ALP SERPL-CCNC: 79 IU/L (ref 39–117)
ALT SERPL-CCNC: 12 IU/L (ref 0–44)
AST SERPL-CCNC: 18 IU/L (ref 0–40)
BILIRUB DIRECT SERPL-MCNC: 0.2 MG/DL (ref 0–0.4)
BILIRUB SERPL-MCNC: 0.7 MG/DL (ref 0–1.2)
BUN SERPL-MCNC: 17 MG/DL (ref 8–27)
BUN/CREAT SERPL: 17 (ref 10–24)
CALCIUM SERPL-MCNC: 9.4 MG/DL (ref 8.6–10.2)
CHLORIDE SERPL-SCNC: 103 MMOL/L (ref 96–106)
CO2 SERPL-SCNC: 25 MMOL/L (ref 20–29)
CREAT SERPL-MCNC: 0.98 MG/DL (ref 0.76–1.27)
ERYTHROCYTE [DISTWIDTH] IN BLOOD BY AUTOMATED COUNT: 12.8 % (ref 11.6–15.4)
GLUCOSE SERPL-MCNC: 75 MG/DL (ref 65–99)
HCT VFR BLD AUTO: 38.4 % (ref 37.5–51)
HGB BLD-MCNC: 12.7 G/DL (ref 13–17.7)
MCH RBC QN AUTO: 30.5 PG (ref 26.6–33)
MCHC RBC AUTO-ENTMCNC: 33.1 G/DL (ref 31.5–35.7)
MCV RBC AUTO: 92 FL (ref 79–97)
PLATELET # BLD AUTO: 266 X10E3/UL (ref 150–450)
POTASSIUM SERPL-SCNC: 5.1 MMOL/L (ref 3.5–5.2)
RBC # BLD AUTO: 4.17 X10E6/UL (ref 4.14–5.8)
SODIUM SERPL-SCNC: 141 MMOL/L (ref 134–144)
TACROLIMUS, 700249: 2.4 NG/ML (ref 2–20)
WBC # BLD AUTO: 7.6 X10E3/UL (ref 3.4–10.8)

## 2020-12-15 ENCOUNTER — OFFICE VISIT (OUTPATIENT)
Dept: HEMATOLOGY | Age: 70
End: 2020-12-15
Payer: MEDICARE

## 2020-12-15 VITALS
TEMPERATURE: 97.5 F | HEART RATE: 61 BPM | DIASTOLIC BLOOD PRESSURE: 75 MMHG | BODY MASS INDEX: 23.15 KG/M2 | HEIGHT: 61 IN | WEIGHT: 122.6 LBS | RESPIRATION RATE: 16 BRPM | SYSTOLIC BLOOD PRESSURE: 128 MMHG | OXYGEN SATURATION: 100 %

## 2020-12-15 DIAGNOSIS — K75.4 AUTOIMMUNE HEPATITIS (HCC): ICD-10-CM

## 2020-12-15 PROCEDURE — 3017F COLORECTAL CA SCREEN DOC REV: CPT | Performed by: PHYSICIAN ASSISTANT

## 2020-12-15 PROCEDURE — 99214 OFFICE O/P EST MOD 30 MIN: CPT | Performed by: PHYSICIAN ASSISTANT

## 2020-12-15 PROCEDURE — G8536 NO DOC ELDER MAL SCRN: HCPCS | Performed by: PHYSICIAN ASSISTANT

## 2020-12-15 PROCEDURE — G8427 DOCREV CUR MEDS BY ELIG CLIN: HCPCS | Performed by: PHYSICIAN ASSISTANT

## 2020-12-15 PROCEDURE — 1101F PT FALLS ASSESS-DOCD LE1/YR: CPT | Performed by: PHYSICIAN ASSISTANT

## 2020-12-15 PROCEDURE — G8420 CALC BMI NORM PARAMETERS: HCPCS | Performed by: PHYSICIAN ASSISTANT

## 2020-12-15 PROCEDURE — G8432 DEP SCR NOT DOC, RNG: HCPCS | Performed by: PHYSICIAN ASSISTANT

## 2020-12-15 RX ORDER — TACROLIMUS 1 MG/1
CAPSULE ORAL
Qty: 90 CAP | Refills: 3 | Status: SHIPPED | OUTPATIENT
Start: 2020-12-15 | End: 2021-10-10

## 2020-12-15 NOTE — PROGRESS NOTES
Reviewed with patient at the time of office visit. Stable findings, remain on present low-dose tac and follow-up in 6 months. I have personally seen and examined this patient.  I have fully participated in the care of this patient. I have reviewed all pertinent clinical information, including history, physical exam, plan and the Resident’s note and agree except as noted.

## 2020-12-15 NOTE — PROGRESS NOTES
Identified pt with two pt identifiers(name and ). Reviewed record in preparation for visit and have obtained necessary documentation. Chief Complaint   Patient presents with    Hepatitis     6 mo F/U      Vitals:    12/15/20 0808   BP: 128/75   Pulse: 61   Resp: 16   Temp: 97.5 °F (36.4 °C)   TempSrc: Temporal   SpO2: 100%   Weight: 122 lb 9.6 oz (55.6 kg)   Height: 5' 1\" (1.549 m)   PainSc:   2   PainLoc: Abdomen       Health Maintenance Review: Patient reminded of \"due or due soon\" health maintenance. I have asked the patient to contact his/her primary care provider (PCP) for follow-up on his/her health maintenance. Coordination of Care Questionnaire:  :   1) Have you been to an emergency room, urgent care, or hospitalized since your last visit? If yes, where when, and reason for visit? no       2. Have seen or consulted any other health care provider since your last visit? If yes, where when, and reason for visit? NO      Patient is accompanied by son I have received verbal consent from Dalia Winter to discuss any/all medical information while they are present in the room.

## 2020-12-15 NOTE — PROGRESS NOTES
[address] [facility], [place], [address], Cite Teddy Marin, MD Philipp Miller PA-C Sunnie Shim, [facility]     April RADHAMES Cornell, [facility]   AZRA Bartlett, [facility]       Ler[facility] Sk[facility] [place] [address]    at [address], [address] Dre Sellers  22.    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 Daiana Alvarez returns to the [place] [id number] for management of autoimmune hepatitis. The active problem list, all pertinent past medical history, medications, endoscopic studies, radiologic findings and laboratory findings related to the liver disorder were reviewed with the patient. The patient also has autoimmune pancreatitis. Both autoimmune diseases were treated with prednisone and Imuran in the past, however when the prednisone dose was lowered, both the autoimmune hepatitis and pancreatitis would flare. He was started on tacrolimus in 1/2016 and with this medication, he has had great response and has been able to discontinue prednisone and Imuran. He is tolerating tacrolimus well with single 1 mg daily administration. His liver enzymes and function have both remained normal with his current dose in reviewing labs from last week.     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 Repeat Fibroscans annually have demonstrated portal fibrosis/mild liver disease after successful treatment of AIH. MRI of the liver was performed in 11/2015. There was a stricture of the common bile duct. Dr. Patricia Lopez treated this with a stent but it did not remain open so he placed a metal stent in the PD in 4/2016. In October 2017, Dr. Patricia Lopez went back in and placed another stent and removed a bile duct stone with no complications. In September 2018, Dr. Patricia Lopez successfully removed the biliary stent and some sludge, no additional stenting done at that time. Patient has noted that for the past one month, he has had increased mid-abdominal/epigastric pain symptoms. No specific nausea or vomiting and no changes in bowel symptoms. He states that this is similar in character to past pancreatic obstructive symptoms and would like this evaluated. He has also noted mild intermittent elevation of BP at home. This is not elevated in the office. He has had two month h/o enlargement of node/soft tissue swelling under the left jaw angle. This is nontender. The patient has no limitations in functional activities which can be attributed to the liver disease. Today, patient denies change in bowel habits, dark urine, myalgias, arthralgias, pruritus and problems concentrating. ALLERGIES:  No Known Allergies    MEDICATIONS  Current Outpatient Medications   Medication Sig    tacrolimus (PROGRAF) 1 mg capsule Take 1 tab every day. 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  calcium-cholecalciferol, d3, (CALCIUM 600 + D) 600-125 mg-unit tab Take  by mouth.  Omega-3 Fatty Acids (FISH OIL) 300 mg cap Take  by mouth.  GLUCOSAM/CHOND/HYALU/CF BORATE (MOVE FREE [employer] PO) Take  by mouth. No current facility-administered medications for this visit.       SYSTEM REVIEW NOT RELATED TO LIVER DISEASE OR REVIEWED ABOVE:  Constitution systems: Negative for fever, chills, weight gain, weight loss. Eyes: Negative for visual changes. ENT: Negative for sore throat, painful swallowing. Respiratory: Negative for cough, hemoptysis, SOB. Cardiology: Negative for chest pain, palpitations. GI:  Negative for constipation or diarrhea. Occasional heartburn symptoms. : Negative for urinary frequency, dysuria, hematuria, nocturia. Skin: Negative for rash. Hematology: Negative for easy bruising, blood clots. Musculo-skeletal: Negative for back pain, muscle pain, weakness. Neurologic: Negative for headaches, dizziness, vertigo, memory problems not related to HE. Psychology: Negative for anxiety, depression. FAMILY HISTORY:  The father  of colon cancer. The mother has the following chronic diseases: has an autoimmune disease. There is no family history of liver disease. There is a family history of autoimmune diseases. SOCIAL HISTORY:  The patient is . The patient has 2 children. The patient has never used tobacco products. The patient has never consumed significant amounts of alcohol. The patient currently works full time in the [employer] at [employer]. PHYSICAL EXAMINATION:  Visit Vitals  /75 (BP 1 Location: Left arm, BP Patient Position: Sitting)   Pulse 61   Temp 97.5 °F (36.4 °C) (Temporal)   Resp 16   Ht 5' 1\" (1.549 m)   Wt 122 lb 9.6 oz (55.6 kg)   SpO2 100%   BMI 23.17 kg/m²     General: No acute distress. Eyes: Sclera anicteric. ENT: No oral lesions. Thyroid normal. 1 cm indurated nodule of the left submandibular region. Nontender. Nodes: No adenopathy. Skin: No spider angiomata. No jaundice. No palmar erythema. Respiratory: Lungs clear to auscultation. Cardiovascular: Regular heart rate. No murmurs. No JVD. Abdomen: Soft non-tender. Liver size normal to percussion/palpation. Spleen not palpable.  No obvious ascites. Extremities: No edema. No muscle wasting. No gross arthritic changes. Neurologic: Alert and oriented. Cranial nerves grossly intact. No asterixis. LABORATORY STUDIES:  [place] [id number]  [address] & Units 12/7/2020 7/23/2019   WBC 3.4 - 10.8 x10E3/uL 7.6 8.4   HGB 13.0 - 17.7 g/dL 12.7 (L) 12.4 (L)    - 450 x10E3/uL 266 282   AST 0 - 40 IU/L 18 17   ALT 0 - 44 IU/L 12 13   Alk Phos 39 - 117 IU/L 79 84   Bili, Total 0.0 - 1.2 mg/dL 0.7 0.5   Bili, Direct 0.00 - 0.40 mg/dL 0.20    Albumin 3.8 - 4.8 g/dL 4.6 4.8   BUN 8 - 27 mg/dL 17 19   Creat 0.76 - 1.27 mg/dL 0.98 1.00   Na 134 - 144 mmol/L 141 142   K 3.5 - 5.2 mmol/L 5.1 4.9   Cl 96 - 106 mmol/L 103 104   CO2 20 - 29 mmol/L 25 24   Glucose 65 - 99 mg/dL 75 95     Liver [facility] Latest Ref Rng & Units 1/14/2019   WBC 3.4 - 10.8 x10E3/uL 7.2   HGB 13.0 - 17.7 g/dL 12.6 (L)    - 450 x10E3/uL 268   AST 0 - 40 IU/L 21   ALT 0 - 44 IU/L 17   Alk Phos 39 - 117 IU/L 85   Bili, Total 0.0 - 1.2 mg/dL 0.4   Bili, Direct 0.00 - 0.40 mg/dL    Albumin 3.8 - 4.8 g/dL 4.8   BUN 8 - 27 mg/dL 20   Creat 0.76 - 1.27 mg/dL 0.94   Na 134 - 144 mmol/L 140   K 3.5 - 5.2 mmol/L 4.6   Cl 96 - 106 mmol/L 103   CO2 20 - 29 mmol/L 20   Glucose 65 - 99 mg/dL 111 (H)     Liver Virology and Transplant Immune Suppression Latest Ref Rng & Units 12/7/2020   Tacrolimus Level 2.0 - 20.0 ng/mL 2.4     Liver Virology and Transplant Immune Suppression Latest Ref Rng & Units 12/9/2019   Tacrolimus Level 2.0 - 20.0 ng/mL 5.8     Liver Virology and Transplant Immune Suppression Latest Ref Rng & Units 7/23/2019   Tacrolimus Level 2.0 - 20.0 ng/mL 8.4     From 12/2019  AST/ALT/ALP/T Bili/ALB: 21/13/93/0.5/5.1  WBC/HB/PLT/INR:5.8/13.5/299  BUN/CREAT:20/0.99  Tac: 5.8    SEROLOGIES:  10/2013. HBsAntigen negative, anti-HBcore positive, anti-HBsurface positive, HBE antigen negative, Anti-HBE positive,HBV DNA undetectable, Ferritin 797.     Serologies Latest Ref Rng 6/3/2014   APRIL, IFA  See patterns   APRIL Pattern  1:160 (H)   C-ANCA Neg:<1:20 titer <1:20   P-ANCA Neg:<1:20 titer <1:20   ANCA Neg:<1:20 titer <1:20   ASMCA 0 - 19 Units 25 (H)   M2 Ab 0.0 - 20.0 Units 3.8     LIVER HISTOLOGY:  2013. Reviewed by MLS. Moderate inflammation with stage 3 fibrosis. 8/2016. Fib[facility] performed at [facility]. EkPa was 7.4. Suggested fibrosis level is F1.  11/2017. FibroScan performed at [facility]. EkPa was 8.1. Suggested fibrosis level is F1.  1/2019. FibroScan performed at [facility]. EkPa was 6.3. Suggested fibrosis level is F1. CAP score is 330, suggestive of significant steatosis. 12/2019. FibroScan performed at [facility]. EkPa was 6.6. Suggested fibrosis level is F0-1. CAP score is 286, this is consistent with steatosis. ENDOSCOPIC PROCEDURES:  5/2013. EGD by Dr Levar Sloan. Gastritis and small gastric ulcer. 7/2013. EGD by Dr Levar Sloan. Healed gastritis and .  12/2015. ERCP. Common bile duct stricture. Stent placed. 4/2016. ERCP by Dr Juanita Novak. Pancreatic stricture. Covered metal stent placed. 11/2017. ERCP By Dr. Juanita Novak. Bile duct stricture and stented. Bile duct stone-extracted. 5/2018. ERCP by Dr. Juanita Novak. Bile duct stricture-stent changed. Abnormal mucosa ampulla-biopsied  9/2018. ERCP by Dr. Juanita Novak. Bile duct stricture-resolved. Biliary sludge-extracted. RADIOLOGY:  10/2013. MRI of liver. Diffuse swelling of pancreas with peripancreatic edema. Encasement of CDB by edematous pancreas head. Normal appearing liver. No liver mass lesions. Normal spleen. No dilated bile ducts. No bile duct strictures. No ascites. 8/2014. MRI of liver. Short yet severe stricture of the common duct within the pancreatic head without associated pancreatic ductal or intrahepatic biliary dilatation. There is questionable subtle decreased contrast enhancement within the pancreatic head.  ERCP with endoscopic ultrasound suggested for further evaluation. 11/2015. MRCP. Obstruction of CBD in region of pennie hepatitis. Distal CBD is normal. Proximal to obstruction the intrahepatic bile ducts are dilated. 6/2019. MRI performed in [facility]. Per patient no findings, possible pancreatic atrophy. OTHER TESTING:  Cancer Screening Latest Ref Rng & Units 10/11/2017 6/3/2014   CA 19-9 0 - 35 U/mL 8 69 (H)   CEA ng/mL 0.5    CEA 0.0 - 4.7 ng/mL  1.2   7/2013.  17. ASSESSMENT AND PLAN:  Autoimmune hepatitis with bridging fibrosis in 2013. Repeat FibroScans have demonstrated improvement/stabilization in his liver disease after treatment with a fibrosis score of stage 1 out of 4. Recent labs have continued to show normal liver enzymes. I have asked him to maintain the dose of tac to 1 mg daily. We may at some point consider further reduction, but as he has been stable for both the liver and pancreatic values, will maintain this dosing for now. Fibrosis will be checked annually. Will plan for repeat Fibroscan at follow-up in  6 months. Liver enzymes and liver function remain within normal range. Biliary obstruction. Removal of stent by Dr Rubi Salas in 9/2018 and no further indication for replacement at present. Patient's recent labs and symptoms indicate stability, but given his increased pain perception, would recommend repeat assessment with Dr Rubi Salas in the near future. Will defer any additional evaluation pending Dr Gerri Hanson. I have recommended that the patient consult with PCP re the swelling in the left submandibular region. The patient was directed to continue all current medications at the current dosages. There are no contraindications for the patient to take any medications that are necessary for treatment of other medical issues. All of the above issues were discussed with the patient. All questions were answered.  The patient expressed a clear understanding of the above.    [address] [place] [address] in 6 months with repeat Fibroscan at that time.      Richardson Curran PA-C  Liver [place] [facility], [address] [facility], [facility] 22.  [phone]

## 2021-01-27 ENCOUNTER — TRANSCRIBE ORDER (OUTPATIENT)
Dept: SCHEDULING | Age: 71
End: 2021-01-27

## 2021-01-27 DIAGNOSIS — K86.1 ACUTE ON CHRONIC PANCREATITIS (HCC): Primary | ICD-10-CM

## 2021-01-27 DIAGNOSIS — K85.90 ACUTE ON CHRONIC PANCREATITIS (HCC): Primary | ICD-10-CM

## 2021-01-27 DIAGNOSIS — R11.0 NAUSEA: ICD-10-CM

## 2021-01-27 DIAGNOSIS — R14.0 BLOATING: ICD-10-CM

## 2021-01-27 DIAGNOSIS — K21.9 GASTROESOPHAGEAL REFLUX DISEASE: ICD-10-CM

## 2021-03-22 ENCOUNTER — TELEPHONE (OUTPATIENT)
Dept: HEMATOLOGY | Age: 71
End: 2021-03-22

## 2021-03-22 NOTE — TELEPHONE ENCOUNTER
Two pt identifiers confirmed. Called patient to f/u on referral. Patient recently had a virtual visit with Dr Alem Jeffries. Based on CT scan, the patient was referred to [facility] for a blockage and possible enlargement of the right kidney. He has an appointment on 3/31/2021.  I will request records from both practices

## 2021-04-12 ENCOUNTER — TELEPHONE (OUTPATIENT)
Dept: ONCOLOGY | Age: 71
End: 2021-04-12

## 2021-04-13 ENCOUNTER — OFFICE VISIT (OUTPATIENT)
Dept: ONCOLOGY | Age: 71
End: 2021-04-13
Payer: MEDICARE

## 2021-04-13 VITALS
HEIGHT: 61 IN | WEIGHT: 126.2 LBS | DIASTOLIC BLOOD PRESSURE: 75 MMHG | OXYGEN SATURATION: 98 % | SYSTOLIC BLOOD PRESSURE: 133 MMHG | BODY MASS INDEX: 23.83 KG/M2 | TEMPERATURE: 97.7 F | RESPIRATION RATE: 16 BRPM | HEART RATE: 66 BPM

## 2021-04-13 DIAGNOSIS — N13.30 HYDRONEPHROSIS OF RIGHT KIDNEY: ICD-10-CM

## 2021-04-13 DIAGNOSIS — R19.00 RETROPERITONEAL MASS: ICD-10-CM

## 2021-04-13 DIAGNOSIS — M79.89 SOFT TISSUE MASS: ICD-10-CM

## 2021-04-13 DIAGNOSIS — R10.9 RIGHT FLANK PAIN: ICD-10-CM

## 2021-04-13 DIAGNOSIS — C76.2 MALIGNANT NEOPLASM OF ABDOMEN (HCC): ICD-10-CM

## 2021-04-13 DIAGNOSIS — Z11.59 ENCOUNTER FOR SCREENING FOR OTHER VIRAL DISEASES: ICD-10-CM

## 2021-04-13 DIAGNOSIS — R59.1 LYMPHADENOPATHY: Primary | ICD-10-CM

## 2021-04-13 LAB
HBV SURFACE AB SER QL: REACTIVE
HBV SURFACE AB SER-ACNC: 118.9 MIU/ML

## 2021-04-13 PROCEDURE — G8420 CALC BMI NORM PARAMETERS: HCPCS | Performed by: INTERNAL MEDICINE

## 2021-04-13 PROCEDURE — 3017F COLORECTAL CA SCREEN DOC REV: CPT | Performed by: INTERNAL MEDICINE

## 2021-04-13 PROCEDURE — 99205 OFFICE O/P NEW HI 60 MIN: CPT | Performed by: INTERNAL MEDICINE

## 2021-04-13 PROCEDURE — G8510 SCR DEP NEG, NO PLAN REQD: HCPCS | Performed by: INTERNAL MEDICINE

## 2021-04-13 PROCEDURE — 1101F PT FALLS ASSESS-DOCD LE1/YR: CPT | Performed by: INTERNAL MEDICINE

## 2021-04-13 PROCEDURE — G8427 DOCREV CUR MEDS BY ELIG CLIN: HCPCS | Performed by: INTERNAL MEDICINE

## 2021-04-13 PROCEDURE — G8536 NO DOC ELDER MAL SCRN: HCPCS | Performed by: INTERNAL MEDICINE

## 2021-04-13 RX ORDER — CARVEDILOL 3.12 MG/1
TABLET ORAL
COMMUNITY
Start: 2021-01-18

## 2021-04-13 RX ORDER — TAMSULOSIN HYDROCHLORIDE 0.4 MG/1
CAPSULE ORAL
COMMUNITY
Start: 2021-03-01

## 2021-04-13 NOTE — PROGRESS NOTES
[address] [facility]cology at [address] [place] [address]  [phone]    Hematology / Oncology Consult    Reason for Visit:   Pramod Ross is a 80 y.o. male who is seen in consultation at the request of Dr. Monica Waters for evaluation of lymphoma. Hematology Oncology Treatment History:     Diagnosis: Pending    Stage: Pending    Pathology:     Prior Treatment: None    Current Treatment: pending  Treatment duration   Frequency of visits     History of Present Illness:   Pramod Ross is a 80 y.o. male who is seen for lymphadenopathy. Pt has primary sclerosing cholangitis and follows with Dr. Sera Walker in GI. Has h/o of requiring biliary stents in the past. Currently does not have stent in place. He was having stomach discomfort and gas which led to a CT scan of abd/pelvis. Given finding of R hydronephrosis and referred to Urology. Dr. Monica Waters evaluated further with CT IVP, which was notable for soft tissue infiltrative mass at aortic bifurcation and R femoral arteries. No n/v/d, night sweats, recurrent fevers, unintentional weight loss. Does have dull R back and flank pain. Patient is [facility] and is accompanied by his wife Kiko Schofield, and son Nadira Moon. Nadira Moon is translating today. Past Medical History:   Diagnosis Date    Liver disease     cholangitis      Past Surgical History:   Procedure Laterality Date    HX OTHER SURGICAL      Surgury on testicles    TN ABDOMEN SURGERY PROC UNLISTED      hernia repair      Social History     Tobacco Use    Smoking status: Never Smoker    Smokeless tobacco: Never Used   Substance Use Topics    Alcohol use: No     Alcohol/week: 0.0 standard drinks      Family History   Problem Relation Age of Onset    Cancer Mother     Cancer Sister     Cancer Brother      Current Outpatient Medications   Medication Sig    tacrolimus (PROGRAF) 1 mg capsule Take 1 tab every day.   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  calcium-cholecalciferol, d3, (CALCIUM 600 + D) 600-125 mg-unit tab Take  by mouth.  Omega-3 Fatty Acids (FISH OIL) 300 mg cap Take  by mouth.  GLUCOSAM/CHOND/HYALU/CF BORATE (MOVE FREE [employer] PO) Take  by mouth. No current facility-administered medications for this visit. No Known Allergies     Review of Systems: A complete review of systems was obtained, negative except as described above. Physical Exam:     Visit Vitals  /75   Pulse 66   Temp 97.7 °F (36.5 °C)   Resp 16   Ht 5' 1\" (1.549 m)   Wt 126 lb 3.2 oz (57.2 kg)   SpO2 98%   BMI 23.85 kg/m²     ECOG PS: 1  General: Well developed, no acute distress  Eyes: PERRLA, EOMI, anicteric sclerae  HENT: Atraumatic, OP clear, TMs intact without erythema  Neck: Supple  Lymphatic: No cervical, supraclavicular, axillary or inguinal adenopathy  Respiratory: CTAB, normal respiratory effort  CV: Normal rate, regular rhythm, no murmurs, no peripheral edema  GI: Soft, nontender, nondistended, no masses, no hepatomegaly, no splenomegaly  MS: Normal gait and station. Digits without clubbing or cyanosis. Skin: No rashes, ecchymoses, or petechiae. Normal temperature, turgor, and texture. Neuro/Psych: Alert, oriented. 5/5 strength in all 4 extremities. Appropriate affect, normal judgment/insight. Results:     Lab Results   Component Value Date/Time    WBC 7.6 12/07/2020 12:00 AM    HGB 12.7 (L) 12/07/2020 12:00 AM    HCT 38.4 12/07/2020 12:00 AM    PLATELET 581 52/75/9821 12:00 AM    MCV 92 12/07/2020 12:00 AM    ABS.  NEUTROPHILS 3.6 08/09/2016 12:00 AM     Lab Results   Component Value Date/Time    Sodium 141 12/07/2020 12:00 AM    Potassium 5.1 12/07/2020 12:00 AM    Chloride 103 12/07/2020 12:00 AM    CO2 25 12/07/2020 12:00 AM    Glucose 75 12/07/2020 12:00 AM    BUN 17 12/07/2020 12:00 AM    Creatinine 0.98 12/07/2020 12:00 AM    GFR est AA 91 12/07/2020 12:00 AM    GFR est non-AA 78 12/07/2020 12:00 AM    Calcium 9.4 12/07/2020 12:00 AM    Glucose (POC) 88 12/05/2015 11:25 AM     Lab Results   Component Value Date/Time    Bilirubin, total 0.7 12/07/2020 12:00 AM    ALT (SGPT) 12 12/07/2020 12:00 AM    Alk. phosphatase 79 12/07/2020 12:00 AM    Protein, total 7.3 07/23/2019 12:00 AM    Albumin 4.6 12/07/2020 12:00 AM    Globulin 3.4 12/05/2015 04:03 AM     No results found for: IRON, FE, TIBC, IBCT, PSAT, FERR    No results found for: B12LT, FOL, RBCF  No results found for: TSH, TSH2, TSH3, TSHP, TSHEXT  No results found for: HAMAT, HAAB, HABT, HAAT, HBSAG, HBSB, HBSAT, HBABN, HBCM, HBCAB, HBCAT, XBCABS, HBEAB, [address] [place] [address], [facility], [id number], [address] [place] [address], [facility], HBCLT, HBEBLT, [id number], [id number], [address] [place] [address], [id number], HBCMLT, [id number], HCGAT      Imaging:     Imaging at [address] [facility] Urology:  4/2/21 CT IVP:  Impression:   1. Abnormal infiltrative soft tissue around the aortic bifurcation and proximal common femoral arteries. Different considerations include retroperitoneal fibrosis or lymphoproliferative disorder. No other sites of pathologic lymphadenopathy. 2. Significant R hydronephrosis. The right mid ureter is obstructed at the level of the infiltrative periaortic soft tissue. 3. No left hydronephrosis. 4. No obstructing stones  5. No suspicious collecting system or urinary bladder lesions. Assessment & Plan:   Delmis Rothman is a 80 y.o. male is seen for RP soft tissue mass, LAD. 1. Lymphadenopathy / Retroperitoneal soft tissue mass:  Located near aortic bifurcation and R femoral artery, causing compression of R ureter, leading to hydronephrosis. This region is somewhat suspicious for lymphoma, but could also be retroperitoneal fibrosis or other etiology. As this region is a not easy to safely biopsy, I recommend further evaluation with PET to determine whether mass is hypermetabolic. PET would also allow imaging of chest and rule out lymphadenopathy or primary mass elsewhere.  Had a long discussion with patient and family that we want to safely biopsy this area - after PET imaging, I will discuss with IR whether mass can be safely biopsied by them. If not, will need to refer to a surgeon for biopsy. In the event that that PET shows no hypermetabolic uptake, will need to discuss whether to pursue biopsy or repeat imaging in 2 months. However, prefer to seek a diagnosis now given this mass is causing R hydronephrosis. -- LDH, uric acid, CBC, CMP, Hep B/C profiles, HIV.  -- PET (pt prefers Tues or Wed). Discuss with IR whether biopsy is feasible. -- Return in 2 weeks to review PET. Can be a virtual visit. 2. R hydronephrosis:   Likely due to soft tissue mass/LAD. R ureteral stent placement scheduled for 4/14/21 by Dr. Vandana Lin. 3. Primary sclerosing cholangitis: On Tacrolimus since approx 2yrs. Follows with Dr. Abbi Woodson. Emotional well being: Pt is coping well with his/her disease and has excellent support. I appreciate the opportunity to participate in Mr. Peewee Trevizo's care.     Signed By: Ranjith Alaniz MD     April 13, 2021

## 2021-04-13 NOTE — PROGRESS NOTES
Chief Complaint   Patient presents with    New Patient     Gissel Stiles is a pleasant 80year old male who presents as a new patient for lymphoma.  He denies pain

## 2021-04-27 ENCOUNTER — HOSPITAL ENCOUNTER (OUTPATIENT)
Dept: PET IMAGING | Age: 71
Discharge: HOME | End: 2021-04-27
Attending: INTERNAL MEDICINE
Payer: COMMERCIAL

## 2021-04-27 VITALS — HEIGHT: 61 IN | WEIGHT: 119 LBS | BODY MASS INDEX: 22.47 KG/M2

## 2021-04-27 DIAGNOSIS — C76.2 MALIGNANT NEOPLASM OF ABDOMEN (HCC): ICD-10-CM

## 2021-04-27 DIAGNOSIS — N13.30 HYDRONEPHROSIS OF RIGHT KIDNEY: ICD-10-CM

## 2021-04-27 DIAGNOSIS — R10.9 RIGHT FLANK PAIN: ICD-10-CM

## 2021-04-27 DIAGNOSIS — R59.1 LYMPHADENOPATHY: ICD-10-CM

## 2021-04-27 DIAGNOSIS — R19.00 RETROPERITONEAL MASS: ICD-10-CM

## 2021-04-27 DIAGNOSIS — M79.89 SOFT TISSUE MASS: ICD-10-CM

## 2021-04-27 LAB
GLUCOSE BLD STRIP.AUTO-MCNC: 109 MG/DL (ref 65–100)
SERVICE CMNT-IMP: ABNORMAL

## 2021-04-27 PROCEDURE — A9552 F18 FDG: HCPCS

## 2021-04-27 RX ORDER — SODIUM CHLORIDE 0.9 % (FLUSH) 0.9 %
10 SYRINGE (ML) INJECTION
Status: COMPLETED | OUTPATIENT
Start: 2021-04-27 | End: 2021-04-27

## 2021-04-27 RX ADMIN — Medication 10 ML: at 09:07

## 2021-04-27 NOTE — PROGRESS NOTES
[id number] [facility] Oncology at [address] [place] [address]  [phone]    Hematology / Oncology Established Visit    Reason for Visit:   Antony Moses is a 80 y.o. male who is seen by synchronous (real-time) audio-video technology on 5/4/2021 for follow up of  RP lymphadenopathy. Referred by Dr. Manuel Giraldo. Hematology Oncology Treatment History:     Diagnosis: Pending    Stage: Pending    Pathology:     Prior Treatment: None    Current Treatment: pending  Treatment duration   Frequency of visits     History of Present Illness:   Antony Moses is a 80 y.o. male who comes in for follow up of lymphadenopathy. Pt has primary sclerosing cholangitis and follows with Dr. Jyothi Domingo in GI. Has h/o of requiring biliary stents in the past. Currently does not have stent in place. He was having stomach discomfort and gas which led to a CT scan of abd/pelvis. Given finding of R hydronephrosis and referred to Urology. Dr. Manuel Giraldo evaluated further with CT IVP, which was notable for soft tissue infiltrative mass at aortic bifurcation and R femoral arteries. No n/v/d, night sweats, recurrent fevers, unintentional weight loss. Does have dull R back and flank pain. Patient is [facility] and is accompanied by his wife Tam Martines, and son Jesica Cortez. Jesica Cortez is translating today. Interval History:  Pt had PET scan last week and comes in to review results and discuss biopsy. Saw Dr. Manuel Giraldo yesterday and underwent ultrasound with similar findings to prior CT, no changes in hydronephrosis. Another stent placement may be planned if no improvement in 1 month. Son, Jesica Cortez, is translating.     Past Medical History:   Diagnosis Date    Liver disease     cholangitis      Past Surgical History:   Procedure Laterality Date    HX OTHER SURGICAL      Surgury on testicles    [place] ABDOMEN SURGERY PROC UNLISTED      hernia repair      Social History     Tobacco Use    Smoking status: Never Smoker    Smokeless tobacco: Never Used   Substance Use Topics    Alcohol use: No     Alcohol/week: 0.0 standard drinks      Family History   Problem Relation Age of Onset    Cancer Mother     Cancer Sister     Cancer Brother      Current Outpatient Medications   Medication Sig    carvediloL (COREG) 3.125 mg tablet Take  by mouth.  tamsulosin (FLOMAX) 0.4 mg capsule Take  by mouth.  tacrolimus (PROGRAF) 1 mg capsule Take 1 tab every day. 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  calcium-cholecalciferol, d3, (CALCIUM 600 + D) 600-125 mg-unit tab Take  by mouth.  Omega-3 Fatty Acids (FISH OIL) 300 mg cap Take  by mouth.  GLUCOSAM/CHOND/HYALU/CF BORATE (MOVE FREE Lumatic PO) Take  by mouth. No current facility-administered medications for this visit. No Known Allergies     Review of Systems: A complete review of systems was obtained, negative except as described above. Physical Exam:     There were no vitals taken for this visit. ECOG PS: 1  General: Well developed, no acute distress  Eyes: PERRLA, EOMI, anicteric sclerae  HENT: Atraumatic, OP clear, TMs intact without erythema  Neck: Supple  Lymphatic: No cervical, supraclavicular, axillary or inguinal adenopathy  Respiratory: CTAB, normal respiratory effort  CV: Normal rate, regular rhythm, no murmurs, no peripheral edema  GI: Soft, nontender, nondistended, no masses, no hepatomegaly, no splenomegaly  MS: Normal gait and station. Digits without clubbing or cyanosis. Skin: No rashes, ecchymoses, or petechiae. Normal temperature, turgor, and texture. Neuro/Psych: Alert, oriented. 5/5 strength in all 4 extremities. Appropriate affect, normal judgment/insight. Results:     Lab Results   Component Value Date/Time    WBC 6.5 04/13/2021 02:54 PM    HGB 10.9 (L) 04/13/2021 02:54 PM    HCT 34.8 (L) 04/13/2021 02:54 PM    PLATELET 470 22/35/6448 02:54 PM    MCV 97.8 04/13/2021 02:54 PM    ABS.  NEUTROPHILS 3.6 04/13/2021 02:54 PM     Lab Results   Component Value Date/Time    Sodium 141 04/13/2021 02:54 PM    Potassium 4.6 04/13/2021 02:54 PM    Chloride 109 (H) 04/13/2021 02:54 PM    CO2 27 04/13/2021 02:54 PM    Glucose 119 (H) 04/13/2021 02:54 PM    BUN 33 (H) 04/13/2021 02:54 PM    Creatinine 1.63 (H) 04/13/2021 02:54 PM    GFR est AA 51 (L) 04/13/2021 02:54 PM    GFR est non-AA 42 (L) 04/13/2021 02:54 PM    Calcium 9.1 04/13/2021 02:54 PM    Glucose (POC) 109 (H) 04/27/2021 09:00 AM     Lab Results   Component Value Date/Time    Bilirubin, total 0.4 04/13/2021 02:54 PM    ALT (SGPT) 16 04/13/2021 02:54 PM    Alk. phosphatase 97 04/13/2021 02:54 PM    Protein, total 7.7 04/13/2021 02:54 PM    Albumin 4.3 04/13/2021 02:54 PM    Globulin 3.4 04/13/2021 02:54 PM     No results found for: IRON, FE, TIBC, IBCT, PSAT, FERR    No results found for: B12LT, FOL, RBCF  No results found for: TSH, TSH2, TSH3, TSHP, TSHEXT, TSHEXT  Lab Results   Component Value Date/Time    Hepatitis B surface Ag <0.10 04/13/2021 02:54 PM    Hepatitis B surface Ab 118.90 04/13/2021 03:01 PM    Hep B Core Ab, total Negative 04/13/2021 02:54 PM     Lab Results   Component Value Date/Time    Uric acid 8.1 (H) 04/13/2021 02:54 PM       Imaging:     Imaging at [facility]:  4/2/21 CT IVP:  Impression:   1. Abnormal infiltrative soft tissue around the aortic bifurcation and proximal common femoral arteries. Different considerations include retroperitoneal fibrosis or lymphoproliferative disorder. No other sites of pathologic lymphadenopathy. 2. Significant R hydronephrosis. The right mid ureter is obstructed at the level of the infiltrative periaortic soft tissue. 3. No left hydronephrosis. 4. No obstructing stones  5. No suspicious collecting system or urinary bladder lesions. 4/27/21 PET:  IMPRESSION  1.  There is increased soft tissue density at the aortic bifurcation with slight  increased metabolic activity of 4.2 which is nonspecific and could be inflammatory/retroperitoneal fibrosis although low-grade neoplastic process is  not excluded and continued follow-up would be helpful. 2. There are normal sized mediastinal and hilar lymph nodes with slight  increased metabolic activity of 2.8 most likely reactive but again close  follow-up would be helpful. .  There is also a 1 cm right supraclavicular lymph node with slight increased  metabolic activity of 3.2 again nonspecific and this is potentially amenable to  fine-needle aspiration with ultrasound guidance as is clinically indicated. Assessment & Plan:   Nayana Vences is a 80 y.o. male is seen for RP soft tissue mass, LAD. 1. Lymphadenopathy / Retroperitoneal soft tissue mass:  Located near aortic bifurcation and R femoral artery, causing compression of R ureter, leading to hydronephrosis. This region is somewhat suspicious for lymphoma, but could also be retroperitoneal fibrosis or other etiology. PET only shows mildly hypermetabolic uptake in RP mass and Right supraclavicular region - these findings are not consistent with an aggressive lymphoma - but do not differentiate between a low grade lymphoma vs RP fibrosis. Normal LDH, but elevated uric acid 8.1. Hep B/C profiles appropriate negative. Biopsy of RP mass not feasible by IR. I discussed case with Dr. Lesley Griffith in Urology extensively. Our concern is that Vascular surgery approach would be a major surgery with large open incision - too invasive given this is a slow growing mass. Dr. Lesley Griffith may be able to pursue a laparascopic biopsy, but is unsure whether an adequate sample could be obtained. At this time, we will pursue BM biopsy and ultrasound-guided biopsy of supraclavic LN. If positive for low grade lymphoma, that will increase suspicion that the RP soft tissue mass is also low grade lymphoma. If BM and LN are both negative, this information will not be helpful in differentiating between lymphoma vs RP fibrosis.  In that situation, options include laparascopic biopsy vs empiric treatment with steroids (which will shrink the mass if lymphoma or RP fibrosis.) Given low grade lymphomas are not usually curable, but can be treated on and off when problems arise, this is a reasonable approach given risk of biopsying this RP mass. -- Check HIV, gammopathy panel  -- Bone marrow biopsy and ultrasound-guided, Right supraclavicular LN biopsy on same day  -- Present case to tumor board  -- Return 1 week after biopsies completed. 2. R hydronephrosis:   Likely due to soft tissue mass/LAD. R ureteral stent placed 4/14/21 by Dr. Krista Parker. 3. Primary sclerosing cholangitis: On Tacrolimus since approx 2yrs. Follows with Dr. Doris Patino. Emotional well being: Pt is coping well with his/her disease and has excellent support. I appreciate the opportunity to participate in Mr. Jacklyn Trevizo's care. Total physician time spent on this encounter was 60 minutes on date of encounter. I also discussed case with Dr. Philip Benedict (Radiology) and Dr. Krista Parker (Urology) on date of this encounter. Unless otherwise specified on date of this note/addendum, I personally spent 30 minutes in non-face-to-face prolonged care time in coordination of the patient's care as described here: called patient to relay my discussion with Dr. Krista Parker and advised on BM biopsy, supraclav LN biopsy as detailed in Assessment. Date of service if different from date of note/addendum: 5/5/21      The patient was evaluated through a synchronous (real-time) audio-video encounter. The patient (or guardian if applicable) is aware that this is a billable service. Verbal consent to proceed has been obtained within the past 12 months. The visit was conducted pursuant to the emergency declaration under the [facility], [address] [place] [address] authority and the [employer] General Act.   Patient identification was verified, and a caregiver was present when appropriate. The patient was located in a state where the provider was credentialed to provide care.           Signed By: Yemi Mcdonnell MD     May 4, 2021

## 2021-05-04 ENCOUNTER — VIRTUAL VISIT (OUTPATIENT)
Dept: ONCOLOGY | Age: 71
End: 2021-05-04
Payer: MEDICARE

## 2021-05-04 DIAGNOSIS — N13.30 HYDRONEPHROSIS OF RIGHT KIDNEY: ICD-10-CM

## 2021-05-04 DIAGNOSIS — R19.00 RETROPERITONEAL MASS: Primary | ICD-10-CM

## 2021-05-04 DIAGNOSIS — K83.01 PRIMARY SCLEROSING CHOLANGITIS: ICD-10-CM

## 2021-05-04 DIAGNOSIS — R59.0 LYMPHADENOPATHY, SUPRACLAVICULAR: ICD-10-CM

## 2021-05-04 PROCEDURE — 99215 OFFICE O/P EST HI 40 MIN: CPT | Performed by: INTERNAL MEDICINE

## 2021-05-04 PROCEDURE — 99358 PROLONG SERVICE W/O CONTACT: CPT | Performed by: INTERNAL MEDICINE

## 2021-05-04 PROCEDURE — 3017F COLORECTAL CA SCREEN DOC REV: CPT | Performed by: INTERNAL MEDICINE

## 2021-05-04 PROCEDURE — G8427 DOCREV CUR MEDS BY ELIG CLIN: HCPCS | Performed by: INTERNAL MEDICINE

## 2021-05-04 PROCEDURE — 1101F PT FALLS ASSESS-DOCD LE1/YR: CPT | Performed by: INTERNAL MEDICINE

## 2021-05-04 PROCEDURE — G8510 SCR DEP NEG, NO PLAN REQD: HCPCS | Performed by: INTERNAL MEDICINE

## 2021-05-04 NOTE — PROGRESS NOTES
Chief Complaint   Patient presents with   Efrain Holman is a pleasant 80year old male who presents as a follow up for lymph node mass.

## 2021-05-06 DIAGNOSIS — R19.00 RETROPERITONEAL MASS: Primary | ICD-10-CM

## 2021-05-06 DIAGNOSIS — R59.0 LYMPHADENOPATHY, SUPRACLAVICULAR: ICD-10-CM

## 2021-05-07 ENCOUNTER — TELEPHONE (OUTPATIENT)
Dept: ONCOLOGY | Age: 71
End: 2021-05-07

## 2021-05-07 NOTE — TELEPHONE ENCOUNTER
[address] [place]  [phone]        05/07/21 12:12 PM Called patient's son, Christelle Hackett (listed on [facility]), to follow up on decision regarding procedures Dr. Mehdi Salas recommended. Christelle Hackett states patient agrees to proceed. Sent message to [employer] requesting assistance in scheduling. Also advised, per Dr. Mehdi Salas, that patient have additional lab work drawn. Patient lives a distance away from office. Discussed plan that patient will have labs drawn at [facility] lab after completing above appointments. No further questions or concerns at this time.

## 2021-05-21 ENCOUNTER — HOSPITAL ENCOUNTER (OUTPATIENT)
Dept: CT IMAGING | Age: 71
Discharge: HOME | End: 2021-05-21
Attending: INTERNAL MEDICINE
Payer: COMMERCIAL

## 2021-05-21 ENCOUNTER — HOSPITAL ENCOUNTER (OUTPATIENT)
Dept: ULTRASOUND IMAGING | Age: 71
Discharge: HOME | End: 2021-05-21
Attending: INTERNAL MEDICINE
Payer: COMMERCIAL

## 2021-05-21 VITALS
HEART RATE: 67 BPM | BODY MASS INDEX: 22.84 KG/M2 | HEIGHT: 61 IN | OXYGEN SATURATION: 99 % | RESPIRATION RATE: 19 BRPM | SYSTOLIC BLOOD PRESSURE: 137 MMHG | TEMPERATURE: 97.9 F | DIASTOLIC BLOOD PRESSURE: 76 MMHG | WEIGHT: 121 LBS

## 2021-05-21 DIAGNOSIS — R59.0 LYMPHADENOPATHY, SUPRACLAVICULAR: ICD-10-CM

## 2021-05-21 DIAGNOSIS — R19.00 RETROPERITONEAL MASS: ICD-10-CM

## 2021-05-21 LAB
BASOPHILS # BLD: 0 K/UL (ref 0–0.1)
BASOPHILS NFR BLD: 1 % (ref 0–1)
COMMENT, HOLDF: NORMAL
DIFFERENTIAL METHOD BLD: ABNORMAL
EOSINOPHIL # BLD: 0.1 K/UL (ref 0–0.4)
EOSINOPHIL NFR BLD: 2 % (ref 0–7)
ERYTHROCYTE [DISTWIDTH] IN BLOOD BY AUTOMATED COUNT: 13.1 % (ref 11.5–14.5)
HBV SURFACE AB SER QL: REACTIVE
HBV SURFACE AB SER-ACNC: 85.33 MIU/ML
HCT VFR BLD AUTO: 36.1 % (ref 36.6–50.3)
HGB BLD-MCNC: 11.3 G/DL (ref 12.1–17)
HIV 1+2 AB+HIV1 P24 AG SERPL QL IA: NONREACTIVE
HIV12 RESULT COMMENT, HHIVC: NORMAL
IMM GRANULOCYTES # BLD AUTO: 0 K/UL (ref 0–0.04)
IMM GRANULOCYTES NFR BLD AUTO: 0 % (ref 0–0.5)
LYMPHOCYTES # BLD: 2.2 K/UL (ref 0.8–3.5)
LYMPHOCYTES NFR BLD: 38 % (ref 12–49)
MCH RBC QN AUTO: 30.1 PG (ref 26–34)
MCHC RBC AUTO-ENTMCNC: 31.3 G/DL (ref 30–36.5)
MCV RBC AUTO: 96.3 FL (ref 80–99)
MONOCYTES # BLD: 0.6 K/UL (ref 0–1)
MONOCYTES NFR BLD: 11 % (ref 5–13)
NEUTS SEG # BLD: 2.7 K/UL (ref 1.8–8)
NEUTS SEG NFR BLD: 48 % (ref 32–75)
NRBC # BLD: 0 K/UL (ref 0–0.01)
NRBC BLD-RTO: 0 PER 100 WBC
PLATELET # BLD AUTO: 246 K/UL (ref 150–400)
PMV BLD AUTO: 10.2 FL (ref 8.9–12.9)
RBC # BLD AUTO: 3.75 M/UL (ref 4.1–5.7)
SAMPLES BEING HELD,HOLD: NORMAL
WBC # BLD AUTO: 5.7 K/UL (ref 4.1–11.1)

## 2021-05-21 PROCEDURE — 88184 FLOWCYTOMETRY/ TC 1 MARKER: CPT

## 2021-05-21 PROCEDURE — 88341 IMHCHEM/IMCYTCHM EA ADD ANTB: CPT

## 2021-05-21 PROCEDURE — 86706 HEP B SURFACE ANTIBODY: CPT

## 2021-05-21 PROCEDURE — 36415 COLL VENOUS BLD VENIPUNCTURE: CPT

## 2021-05-21 PROCEDURE — 77030014115

## 2021-05-21 PROCEDURE — 88360 TUMOR IMMUNOHISTOCHEM/MANUAL: CPT

## 2021-05-21 PROCEDURE — 77030028872 HC BN BIOP NDL ON CNTRL TY TELE -C

## 2021-05-21 PROCEDURE — 88311 DECALCIFY TISSUE: CPT

## 2021-05-21 PROCEDURE — 88313 SPECIAL STAINS GROUP 2: CPT

## 2021-05-21 PROCEDURE — 77030003666 HC NDL SPINAL BD -A

## 2021-05-21 PROCEDURE — 85025 COMPLETE CBC W/AUTO DIFF WBC: CPT

## 2021-05-21 PROCEDURE — 82784 ASSAY IGA/IGD/IGG/IGM EACH: CPT

## 2021-05-21 PROCEDURE — 88185 FLOWCYTOMETRY/TC ADD-ON: CPT

## 2021-05-21 PROCEDURE — 88342 IMHCHEM/IMCYTCHM 1ST ANTB: CPT

## 2021-05-21 PROCEDURE — 88333 PATH CONSLTJ SURG CYTO XM 1: CPT

## 2021-05-21 PROCEDURE — 88305 TISSUE EXAM BY PATHOLOGIST: CPT

## 2021-05-21 PROCEDURE — 74011000250 HC RX REV CODE- 250: Performed by: RADIOLOGY

## 2021-05-21 PROCEDURE — 87389 HIV-1 AG W/HIV-1&-2 AB AG IA: CPT

## 2021-05-21 PROCEDURE — 74011250636 HC RX REV CODE- 250/636: Performed by: RADIOLOGY

## 2021-05-21 PROCEDURE — 38505 NEEDLE BIOPSY LYMPH NODES: CPT

## 2021-05-21 PROCEDURE — 99152 MOD SED SAME PHYS/QHP 5/>YRS: CPT

## 2021-05-21 RX ORDER — MIDAZOLAM HYDROCHLORIDE 1 MG/ML
5 INJECTION, SOLUTION INTRAMUSCULAR; INTRAVENOUS
Status: DISCONTINUED | OUTPATIENT
Start: 2021-05-21 | End: 2021-05-21 | Stop reason: HOSPADM

## 2021-05-21 RX ORDER — FENTANYL CITRATE 50 UG/ML
200 INJECTION, SOLUTION INTRAMUSCULAR; INTRAVENOUS
Status: DISCONTINUED | OUTPATIENT
Start: 2021-05-21 | End: 2021-05-21 | Stop reason: HOSPADM

## 2021-05-21 RX ORDER — SODIUM BICARBONATE 42 MG/ML
1 INJECTION, SOLUTION INTRAVENOUS
Status: COMPLETED | OUTPATIENT
Start: 2021-05-21 | End: 2021-05-21

## 2021-05-21 RX ORDER — SODIUM BICARBONATE 42 MG/ML
2 INJECTION, SOLUTION INTRAVENOUS
Status: DISCONTINUED | OUTPATIENT
Start: 2021-05-21 | End: 2021-05-22 | Stop reason: HOSPADM

## 2021-05-21 RX ORDER — LIDOCAINE HYDROCHLORIDE 10 MG/ML
5 INJECTION, SOLUTION EPIDURAL; INFILTRATION; INTRACAUDAL; PERINEURAL
Status: DISCONTINUED | OUTPATIENT
Start: 2021-05-21 | End: 2021-05-22 | Stop reason: HOSPADM

## 2021-05-21 RX ORDER — SODIUM CHLORIDE 9 MG/ML
25 INJECTION, SOLUTION INTRAVENOUS
Status: DISCONTINUED | OUTPATIENT
Start: 2021-05-21 | End: 2021-05-21 | Stop reason: HOSPADM

## 2021-05-21 RX ADMIN — SODIUM CHLORIDE 25 ML/HR: 9 INJECTION, SOLUTION INTRAVENOUS at 11:55

## 2021-05-21 RX ADMIN — MIDAZOLAM 1 MG: 1 INJECTION INTRAMUSCULAR; INTRAVENOUS at 12:10

## 2021-05-21 RX ADMIN — FENTANYL CITRATE 25 MCG: 50 INJECTION, SOLUTION INTRAMUSCULAR; INTRAVENOUS at 12:10

## 2021-05-21 RX ADMIN — SODIUM BICARBONATE 42 MG: 42 INJECTION, SOLUTION INTRAVENOUS at 11:55

## 2021-05-21 NOTE — DISCHARGE INSTRUCTIONS
BIOPSY DISCHARGE INSTRUCTIONS    General Instructions:     A biopsy is the removal of a small piece of tissue for microscopic examination or testing. Healthy tissue can be obtained for the purpose of tissue-type matching for transplants. Unhealthy tissues are more commonly biopsied to diagnose disease. General Biopsy:     A mass can grow in any area of the body, and we are taking a specimen as ordered by your doctor. The risks are the same. They include bleeding, pain, and infection. Home Care Instructions: You may resume your regular diet and medication regimen. Do not drink alcohol, drive, or make any important legal decisions in the next 24 hours. Do not lift anything heavier than a gallon of milk until the soreness goes away. You may use over the counter acetaminophen or ibuprofen for the soreness. You may apply an ice pack to the affected area for 20-30 minutes at time for the first 24 hours. After that, you may apply a heat pack. Call If:     You should call your Physician and/or the Radiology Nurse if you have any questions or concerns about the biopsy site. Call if you should have increased pain, fever, redness, drainage, or bleeding more than a small spot on the bandage. Follow-Up Instructions: Please see your ordering doctor as he/she has requested. To Reach Us: Side effects of sedation medications and other medications used today have been reviewed. Notify us of nausea, itching, hives, dizziness, or anything else out of the ordinary. Should you experience any of these significant changes, please call [phone] between the hours of 7:30 am and 10 pm or [phone] after hours.  After hours, ask the  to page the [address] [place] [address] Way Technologist, and describe the problem to the technologist.        Date: 5/21/2021  Discharging Nurse: Connor Dockery

## 2021-05-21 NOTE — ROUTINE PROCESS
Discharge instructions explained to Tiburcio Frank, wife, and son all questions answered, and patient verbalized understanding. Copy of discharge instructions given to patient. Patient ambulatory out of IR dept. Patient discharged to the care of wife and son  At time of discharge pt in no acute distress, A/O x 4, denies any pain, and vital signs stable. PIV removed without incident, cath tip intact. Post proc sites clean and dry, no evidence of bleeding or hematoma noted by this RN.

## 2021-05-21 NOTE — H&P
Interventional Radiology History and Physical      Patient: Antony Moses 80 y.o. male  [id number]    Consult Requested by: Ambar Tay MD    Chief Complaint: Lymphadenopathy    History of Present Illness: 81 yo male with hx of lymphadenopathy with modestly active lesions on PET. Radiology has been consulted for image-guided lymph node and image-guided bone marrow biopsy. Patient's first language is Mandarin, however he speaks fairly fluent [place]. Patient was offered Mandarin video  for the purpose of explaining the medical procedures and asking questions and he agreed. Majority of history, exam and explanation performed with  on hand. History:  Past Medical History:   Diagnosis Date    Liver disease     cholangitis     Family History   Problem Relation Age of Onset    Cancer Mother     Cancer Sister     Cancer Brother      Social History     Socioeconomic History    Marital status:      Spouse name: Not on file    Number of children: Not on file    Years of education: Not on file    Highest education level: Not on file   Occupational History    Not on file   Tobacco Use    Smoking status: Never Smoker    Smokeless tobacco: Never Used   Substance and Sexual Activity    Alcohol use: No     Alcohol/week: 0.0 standard drinks    Drug use: No    Sexual activity: Not on file   Other Topics Concern    Not on file   Social History Narrative    ** Merged History Encounter **          Social Determinants of Health     Financial Resource Strain:     Difficulty of Paying Living Expenses:    Food Insecurity:     Worried About Running Out of Food in the Last Year:     Ran Out of Food in the Last Year:    Transportation Needs:     Lack of Transportation (Medical):      Lack of Transportation (Non-Medical):    Physical Activity:     Days of Exercise per Week:     Minutes of Exercise per Session:    Stress:     Feeling of Stress :    Social Connections:     Frequency of Communication with Friends and Family:     Frequency of Social Gatherings with Friends and Family:     Attends Latter day Services:     Active Member of Clubs or Organizations:     Attends Club or Organization Meetings:     Marital Status:    Intimate Partner Violence:     Fear of Current or Ex-Partner:     Emotionally Abused:     Physically Abused:     Sexually Abused: Allergies: No Known Allergies    Current Medications:  Current Outpatient Medications   Medication Sig    carvediloL (COREG) 3.125 mg tablet Take  by mouth.  tamsulosin (FLOMAX) 0.4 mg capsule Take  by mouth.  tacrolimus (PROGRAF) 1 mg capsule Take 1 tab every day. 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  calcium-cholecalciferol, d3, (CALCIUM 600 + D) 600-125 mg-unit tab Take  by mouth.  Omega-3 Fatty Acids (FISH OIL) 300 mg cap Take  by mouth.  GLUCOSAM/CHOND/HYALU/CF BORATE (MOVE FREE JOINT HEALTH PO) Take  by mouth. No current facility-administered medications for this encounter. Facility-Administered Medications Ordered in Other Encounters   Medication Dose Route Frequency    lidocaine (PF) (XYLOCAINE) 10 mg/mL (1 %) injection 5 mL  5 mL SubCUTAneous RAD ONCE    sodium bicarbonate (4.2%) injection 84 mg  2 mL SubCUTAneous RAD ONCE        Review of Systems:  Patient endorses recent history of a leg cramp 2 nights ago, as well as a short period of dyspnea at night that lasted less than a minute approximately a week ago. Patient denies fever, chills, cough, headache, vision changes, difficulty swallowing, shortness of breath, chest pain, abdominal pain, nausea, vomiting, changes in bladder or bowel habits, extremity weakness, numbness, tingling or swelling. The remainder of the review of systems is negative for any additional contributing elements.         Physical Exam:  Blood pressure 137/76, pulse 67, temperature 97.9 °F (36.6 °C), resp. rate 19, height 5' 1\" (1.549 m), weight 54.9 kg (121 lb), SpO2 99 %. GENERAL: alert, cooperative, no distress, appears stated age,   LUNG: clear to auscultation bilaterally,   HEART: regular rate and rhythm,   ABDOMEN: soft, non-tender. Bowel sounds normal.   EXTREMITIES:  extremities normal, atraumatic, no cyanosis or edema,   SKIN: no rash or abnormalities,   NEUROLOGIC: AOx3. Cranial nerves 2-12 and sensation grossly intact. Laboratory:      Recent Labs     05/21/21  1030   HGB 11.3*   HCT 36.1*   WBC 5.7          Imaging:  No results found. Impression/Plan:  Patient has been evaluated and deemed an appropriate candidate for intravenous sedation. Based on history and presentation he is a candidate for CT-guided bone marrow biopsy and US-guided supraclavicular lymph node biopsy. The above procedures were explained to the patient with the assistance of video . Benefits, risks and alternative therapies reviewed and all questions answered to his satisfaction. At this time he wishes to proceed. Please note that Dr. Jamshid Alford participated in the provision of these services. We appreciate the kind consultation and the opportunity to participate in [address] [place] [address]'s care. Chhaya Monson PA-C  Interventional Radiology  [facility]  [facility] [place]  [phone]  [facility] [phone]      CC:  Ez Khan MD

## 2021-05-21 NOTE — ROUTINE PROCESS
Pt arrives ambulatory to angio department accompanied by son for CT guided BMBX and US guided lymph node bx procedure. All assessments completed and consent was reviewed. Education given was regarding procedure, conscious sedation, post-procedure care and  management/follow-up. Opportunity for questions was provided and all questions and concerns were addressed.

## 2021-05-26 LAB
ALBUMIN SERPL ELPH-MCNC: 4.1 G/DL (ref 2.9–4.4)
ALBUMIN/GLOB SERPL: 1.3 {RATIO} (ref 0.7–1.7)
ALPHA1 GLOB SERPL ELPH-MCNC: 0.2 G/DL (ref 0–0.4)
ALPHA2 GLOB SERPL ELPH-MCNC: 0.9 G/DL (ref 0.4–1)
B-GLOBULIN SERPL ELPH-MCNC: 1.1 G/DL (ref 0.7–1.3)
GAMMA GLOB SERPL ELPH-MCNC: 1.1 G/DL (ref 0.4–1.8)
GLOBULIN SER-MCNC: 3.2 G/DL (ref 2.2–3.9)
IGA SERPL-MCNC: 103 MG/DL (ref 61–437)
IGG SERPL-MCNC: 1504 MG/DL (ref 603–1613)
IGM SERPL-MCNC: 37 MG/DL (ref 20–172)
INTERPRETATION SERPL IEP-IMP: ABNORMAL
KAPPA LC FREE SER-MCNC: 48.8 MG/L (ref 3.3–19.4)
KAPPA LC FREE/LAMBDA FREE SER: 2.09 {RATIO} (ref 0.26–1.65)
LAMBDA LC FREE SERPL-MCNC: 23.3 MG/L (ref 5.7–26.3)
M PROTEIN SERPL ELPH-MCNC: 0.3 G/DL
PROT SERPL-MCNC: 7.3 G/DL (ref 6–8.5)

## 2021-05-27 NOTE — PROGRESS NOTES
[id number] [facility] at [facility]  [phone]    Hematology / Oncology Established Visit    Reason for Visit:   Vasiliy Mathews is a 80 y.o. male who is seen by synchronous (real-time) audio-video technology on 6/1/2021 for follow up of  RP lymphadenopathy. Referred by Dr. Darling Apodaca. Hematology Oncology Treatment History:     Diagnosis: Pending    Stage: Pending    Pathology:     5/21/21 Bone marrow biopsy:  Normocellular marrow with maturing trilineage hematopoiesis   No morphologic or immunophenotypic evidence of a lymphoproliferative disorder   Flow cytometry shows no diagnostic immunophenotypic abnormalities     5/21/21 Lymph node, right supraclavicular, core biopsy:   Lymphoid tissue with no definitive morphologic, immunohistochemical, or flow cytometric evidence of   a lymphoproliferative disorder, see comment     Prior Treatment: None    Current Treatment: pending  Treatment duration   Frequency of visits     History of Present Illness:   Vasiliy Mathews is a 80 y.o. male who comes in for follow up of lymphadenopathy. Pt has primary sclerosing cholangitis and follows with Dr. Laci Mirza in GI. Has h/o of requiring biliary stents in the past. Currently does not have stent in place. He was having stomach discomfort and gas which led to a CT scan of abd/pelvis. Given finding of R hydronephrosis and referred to Urology. Dr. Darling Apodaca evaluated further with CT IVP, which was notable for soft tissue infiltrative mass at aortic bifurcation and R femoral arteries. No n/v/d, night sweats, recurrent fevers, unintentional weight loss. Does have dull R back and flank pain. Patient is [place] and is accompanied by his wife Mery Kendall, and son Chey Connors. Chey Connors is translating today. Interval History:  Pt underwent BM biopsy and supraclavicular LN biopsy since our last visit. He is scheduled to see Dr. Darling Apodaca and get repeat ultrasound on 6/16/21.  He reports some difficulty with holding urine / urinary incontinence, stating he has to rush to the bathroom at times. He denies fevers, chills, sweats, unintentional weight loss, n/v/d, abdom or back pain. Past Medical History:   Diagnosis Date    Liver disease     cholangitis      Past Surgical History:   Procedure Laterality Date    HX OTHER SURGICAL      Surgury on testicles    [place] ABDOMEN SURGERY PROC UNLISTED      hernia repair      Social History     Tobacco Use    Smoking status: Never Smoker    Smokeless tobacco: Never Used   Substance Use Topics    Alcohol use: No     Alcohol/week: 0.0 standard drinks      Family History   Problem Relation Age of Onset    Cancer Mother     Cancer Sister     Cancer Brother      Current Outpatient Medications   Medication Sig    carvediloL (COREG) 3.125 mg tablet Take  by mouth.  tamsulosin (FLOMAX) 0.4 mg capsule Take  by mouth.  tacrolimus (PROGRAF) 1 mg capsule Take 1 tab every day. 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  calcium-cholecalciferol, d3, (CALCIUM 600 + D) 600-125 mg-unit tab Take  by mouth.  Omega-3 Fatty Acids (FISH OIL) 300 mg cap Take  by mouth.  GLUCOSAM/CHOND/HYALU/CF BORATE (MOVE FREE [employer] PO) Take  by mouth. No current facility-administered medications for this visit. No Known Allergies     Review of Systems: A complete review of systems was obtained, negative except as described above. Physical Exam:     There were no vitals taken for this visit.   ECOG PS: 1  General: Well developed, no acute distress  Eyes: PERRLA, EOMI, anicteric sclerae  HENT: Atraumatic, OP clear, TMs intact without erythema  Neck: Supple  Lymphatic: No cervical, supraclavicular, axillary or inguinal adenopathy  Respiratory: CTAB, normal respiratory effort  CV: Normal rate, regular rhythm, no murmurs, no peripheral edema  GI: Soft, nontender, nondistended, no masses, no hepatomegaly, no splenomegaly  MS: Normal gait and station. Digits without clubbing or cyanosis. Skin: No rashes, ecchymoses, or petechiae. Normal temperature, turgor, and texture. Neuro/Psych: Alert, oriented. 5/5 strength in all 4 extremities. Appropriate affect, normal judgment/insight. Results:     Lab Results   Component Value Date/Time    WBC 5.7 05/21/2021 10:30 AM    HGB 11.3 (L) 05/21/2021 10:30 AM    HCT 36.1 (L) 05/21/2021 10:30 AM    PLATELET 993 31/22/7526 10:30 AM    MCV 96.3 05/21/2021 10:30 AM    ABS. NEUTROPHILS 2.7 05/21/2021 10:30 AM     Lab Results   Component Value Date/Time    Sodium 141 04/13/2021 02:54 PM    Potassium 4.6 04/13/2021 02:54 PM    Chloride 109 (H) 04/13/2021 02:54 PM    CO2 27 04/13/2021 02:54 PM    Glucose 119 (H) 04/13/2021 02:54 PM    BUN 33 (H) 04/13/2021 02:54 PM    Creatinine 1.63 (H) 04/13/2021 02:54 PM    GFR est AA 51 (L) 04/13/2021 02:54 PM    GFR est non-AA 42 (L) 04/13/2021 02:54 PM    Calcium 9.1 04/13/2021 02:54 PM    Glucose (POC) 109 (H) 04/27/2021 09:00 AM     Lab Results   Component Value Date/Time    Bilirubin, total 0.4 04/13/2021 02:54 PM    ALT (SGPT) 16 04/13/2021 02:54 PM    Alk. phosphatase 97 04/13/2021 02:54 PM    Protein, total 7.3 05/21/2021 10:30 AM    Albumin 4.3 04/13/2021 02:54 PM    Globulin 3.4 04/13/2021 02:54 PM     No results found for: IRON, FE, TIBC, IBCT, PSAT, FERR    No results found for: B12LT, FOL, RBCF  No results found for: TSH, TSH2, TSH3, TSHP, TSHEXT, TSHEXT  Lab Results   Component Value Date/Time    Hepatitis B surface Ag <0.10 04/13/2021 02:54 PM    Hepatitis B surface Ab 85.33 05/21/2021 10:30 AM    Hep B Core Ab, total Negative 04/13/2021 02:54 PM     Lab Results   Component Value Date/Time    Uric acid 8.1 (H) 04/13/2021 02:54 PM     5/21/21: HIV negative    5/21/21: SPEP/JESENIA and free light chains: M-spike 0.3, KLC [employer] 22, k/l ratio 2.09  Immunofixation shows an area of restricted mobility in the IgG   region.  No light chain association can be confirmed at this time. Suggestion of Heavy Chain Disease. Imaging:     Imaging at [facility] Urology:  4/2/21 CT IVP:  Impression:   1. Abnormal infiltrative soft tissue around the aortic bifurcation and proximal common femoral arteries. Different considerations include retroperitoneal fibrosis or lymphoproliferative disorder. No other sites of pathologic lymphadenopathy. 2. Significant R hydronephrosis. The right mid ureter is obstructed at the level of the infiltrative periaortic soft tissue. 3. No left hydronephrosis. 4. No obstructing stones  5. No suspicious collecting system or urinary bladder lesions. 4/27/21 PET:  IMPRESSION  1. There is increased soft tissue density at the aortic bifurcation with slight  increased metabolic activity of 4.2 which is nonspecific and could be   inflammatory/retroperitoneal fibrosis although low-grade neoplastic process is  not excluded and continued follow-up would be helpful. 2. There are normal sized mediastinal and hilar lymph nodes with slight  increased metabolic activity of 2.8 most likely reactive but again close  follow-up would be helpful. .  There is also a 1 cm right supraclavicular lymph node with slight increased  metabolic activity of 3.2 again nonspecific and this is potentially amenable to  fine-needle aspiration with ultrasound guidance as is clinically indicated. Assessment & Plan:   Torey Neff is a 80 y.o. male is seen for RP soft tissue mass, LAD. 1. Lymphadenopathy / Retroperitoneal soft tissue mass:  Located near aortic bifurcation and R femoral artery, causing compression of R ureter, leading to hydronephrosis. This region is somewhat suspicious for lymphoma, but could also be retroperitoneal fibrosis or other etiology.  PET only shows mildly hypermetabolic uptake in RP mass and Right supraclavicular region - these findings are not consistent with an aggressive lymphoma - but do not differentiate between a low grade lymphoma vs RP fibrosis. Normal LDH, but elevated uric acid 8.1. Hep B/C profiles appropriate negative. Biopsy of RP mass not feasible by IR. I discussed case with Dr. Monica Waters in Urology extensively. Our concern is that Vascular surgery approach would be a major surgery with large open incision - too invasive given this is a slow growing mass. Dr. Monica Waters may be able to pursue a laparascopic biopsy, but is unsure whether an adequate sample could be obtained. Bone marrow biopsy and Right supraclavicular LN biopsies were negative for lymphoma. However, this does not necessarily help in differentiating between lymphoma vs RP fibrosis near the aortic bifurcation. In that situation, options include laparascopic biopsy vs empiric treatment with steroids (which will shrink the mass if lymphoma or RP fibrosis.) Given low grade lymphomas are not usually curable, but can be treated on and off when problems arise, this is a reasonable approach given risk of biopsying this RP mass. -- Discussed with Dr. Monica Waters about repeating imaging and then attempting biopsy vs treating with steroids. -- Recommend repeat CT of abd/pelvis (prior to 6/10/21) to determine whether soft tissue mass has improved or not     -- If no improvement in soft tissue mass, will consider treatment with steroids. -- Present case to tumor board on 6/10/21  -- Return for repeat virtual visit on 6/17/21    2. R hydronephrosis:   Likely due to soft tissue mass/LAD. R ureteral stent placed 4/14/21 by Dr. Monica Waters. 3. Primary sclerosing cholangitis: On Tacrolimus since approx 2yrs. Follows with Dr. Sera Walker. Emotional well being: Pt is coping well with his/her disease and has excellent support. I appreciate the opportunity to participate in Mr. Jacklyn Trevizo's care. Total physician time spent on this encounter was 40 minutes. The patient was evaluated through a synchronous (real-time) audio-video encounter.  The patient (or guardian if applicable) is aware that this is a billable service. Verbal consent to proceed has been obtained within the past 12 months. The visit was conducted pursuant to the emergency declaration under the [address] [place] [address] and the [employer] and [employer] General Act. Patient identification was verified, and a caregiver was present when appropriate. The patient was located in a state where the provider was credentialed to provide care.       Signed By: Wyatt Washington MD     June 1, 2021

## 2021-06-01 ENCOUNTER — VIRTUAL VISIT (OUTPATIENT)
Dept: ONCOLOGY | Age: 71
End: 2021-06-01
Payer: MEDICARE

## 2021-06-01 ENCOUNTER — TELEPHONE (OUTPATIENT)
Dept: ONCOLOGY | Age: 71
End: 2021-06-01

## 2021-06-01 DIAGNOSIS — N13.30 HYDRONEPHROSIS OF RIGHT KIDNEY: ICD-10-CM

## 2021-06-01 DIAGNOSIS — K83.01 PRIMARY SCLEROSING CHOLANGITIS: ICD-10-CM

## 2021-06-01 DIAGNOSIS — R19.00 RETROPERITONEAL MASS: Primary | ICD-10-CM

## 2021-06-01 DIAGNOSIS — R59.0 LYMPHADENOPATHY, SUPRACLAVICULAR: ICD-10-CM

## 2021-06-01 PROCEDURE — 99215 OFFICE O/P EST HI 40 MIN: CPT | Performed by: INTERNAL MEDICINE

## 2021-06-01 PROCEDURE — 3017F COLORECTAL CA SCREEN DOC REV: CPT | Performed by: INTERNAL MEDICINE

## 2021-06-01 PROCEDURE — 1101F PT FALLS ASSESS-DOCD LE1/YR: CPT | Performed by: INTERNAL MEDICINE

## 2021-06-01 PROCEDURE — G8510 SCR DEP NEG, NO PLAN REQD: HCPCS | Performed by: INTERNAL MEDICINE

## 2021-06-01 PROCEDURE — G8427 DOCREV CUR MEDS BY ELIG CLIN: HCPCS | Performed by: INTERNAL MEDICINE

## 2021-06-01 NOTE — PROGRESS NOTES
Chief Complaint   Patient presents with    Follow-up     Alfredo Foreman is a pleasant 80year old male who presents as a follow up for RP lymphadenopathy.  He denies pain

## 2021-06-01 NOTE — TELEPHONE ENCOUNTER
Spoke with Foreign Torres about making return appt. He wants to get scan schedule first and then call back to make appt. Will call office to make appt. Return in about 16 days (around 6/17/2021) for Lymphadenopathy follow up, Alex.

## 2021-06-02 ENCOUNTER — TELEPHONE (OUTPATIENT)
Dept: ONCOLOGY | Age: 71
End: 2021-06-02

## 2021-06-02 NOTE — TELEPHONE ENCOUNTER
Spoke with fraicsco from radiology at [facility] and she states they also utilize [employer]  and that she can electronically send us the imaging to us within 15-30 mins. She states when you check it still might show imaging under their office so that you should search the patient by name instead of by the MRN number on our records. She also told me to pass on her direct number and EXT incase you run into problems. She can be reached at [phone] EXT [phone]. Spoke with patient son and advised him of Dr. Moore Favor recommendations per nannette and notified him of patient needing repeat CT by 6/10.     Sent message to Erin Pitts to call and get him scheduled

## 2021-06-02 NOTE — TELEPHONE ENCOUNTER
----- Message from Juan Knox MD sent at 6/1/2021  4:55 PM EDT -----  Please request the April 2021 CT IVP from [facility] (done at [facility] [place]) to be sent to us ASAP on a disc/CD so we can have it loaded. Also, please let the patient's son know that I spoke with Dr. Darling Apodaca. He agrees with my plan of repeating a CT of abd/pelvis here at [facility] to see whether any changes have occurred.  Please let patient's son know that I prefer the abd/pelvis CT to be done before 6/10/21 (so anytime between now and 6/9/21.)     Thank you,  Myra Ennis

## 2021-06-08 NOTE — TELEPHONE ENCOUNTER
Spoke with fracisco and she resent imaging again and it stated complete so she states to wait abot 15 mins and try looking again. She also states a woman by the name of brittney who works at [facility] knows fracisco and could help you pull it.  If you still cannot find it she states to give her a call back and leave her vm or fax request to [phone] and they will mail disk

## 2021-06-09 ENCOUNTER — HOSPITAL ENCOUNTER (OUTPATIENT)
Dept: CT IMAGING | Age: 71
Discharge: HOME | End: 2021-06-09
Attending: INTERNAL MEDICINE
Payer: COMMERCIAL

## 2021-06-09 DIAGNOSIS — N13.30 HYDRONEPHROSIS OF RIGHT KIDNEY: ICD-10-CM

## 2021-06-09 DIAGNOSIS — R19.00 RETROPERITONEAL MASS: ICD-10-CM

## 2021-06-09 PROCEDURE — 74011000636 HC RX REV CODE- 636: Performed by: RADIOLOGY

## 2021-06-09 PROCEDURE — 74177 CT ABD & PELVIS W/CONTRAST: CPT

## 2021-06-09 RX ADMIN — IOPAMIDOL 100 ML: 755 INJECTION, SOLUTION INTRAVENOUS at 09:24

## 2021-06-09 NOTE — TELEPHONE ENCOUNTER
[address] at [facility]  [phone]        21 8:56 AM Called Ariel Harp in CT department at [facility]. Provided patient name and , as well as specific imaging requested to be pushed to [employer]. If imaging there, requested that it be pulled into PACs. Ariel Harp stated she would look for imaging and call this nurse back with update of whether or not they received imaging. Provided office phone number for call back. 9:09 AM PSR received return call from Ariel Harp in [address]. Ariel Harp advised CT IVP now pulled into PACs. Will update MD.      10:55 AM CT abd/pelv faxed to Dr. Suly Finn. No further questions or concerns at this time.

## 2021-06-17 ENCOUNTER — VIRTUAL VISIT (OUTPATIENT)
Dept: ONCOLOGY | Age: 71
End: 2021-06-17
Payer: MEDICARE

## 2021-06-17 DIAGNOSIS — R10.9 RIGHT FLANK PAIN: ICD-10-CM

## 2021-06-17 DIAGNOSIS — R19.00 RETROPERITONEAL MASS: ICD-10-CM

## 2021-06-17 DIAGNOSIS — R59.0 LYMPHADENOPATHY, SUPRACLAVICULAR: ICD-10-CM

## 2021-06-17 DIAGNOSIS — K83.01 PRIMARY SCLEROSING CHOLANGITIS: ICD-10-CM

## 2021-06-17 DIAGNOSIS — N13.30 HYDRONEPHROSIS OF RIGHT KIDNEY: ICD-10-CM

## 2021-06-17 DIAGNOSIS — N13.5 RETROPERITONEAL FIBROSIS: Primary | ICD-10-CM

## 2021-06-17 PROCEDURE — G8427 DOCREV CUR MEDS BY ELIG CLIN: HCPCS | Performed by: INTERNAL MEDICINE

## 2021-06-17 PROCEDURE — 99215 OFFICE O/P EST HI 40 MIN: CPT | Performed by: INTERNAL MEDICINE

## 2021-06-17 PROCEDURE — G8510 SCR DEP NEG, NO PLAN REQD: HCPCS | Performed by: INTERNAL MEDICINE

## 2021-06-17 PROCEDURE — 1101F PT FALLS ASSESS-DOCD LE1/YR: CPT | Performed by: INTERNAL MEDICINE

## 2021-06-17 PROCEDURE — 3017F COLORECTAL CA SCREEN DOC REV: CPT | Performed by: INTERNAL MEDICINE

## 2021-06-17 NOTE — PROGRESS NOTES
Chief Complaint   Patient presents with    Follow-up     Richar Thomas is a pleasant 80year old male who presents as a follow up for lymphadenopathy.  He denies pain

## 2021-06-17 NOTE — PROGRESS NOTES
[id number] [facility]  [phone]    Hematology / Oncology Established Visit    Reason for Visit:   Kelley St is a 80 y.o. male who is seen by synchronous (real-time) audio-video technology on 6/17/2021 for follow up of  RP lymphadenopathy. Referred by Dr. Edith Kehr. Hematology Oncology Treatment History:     Diagnosis: Pending    Stage: Pending    Pathology:     5/21/21 Bone marrow biopsy:  Normocellular marrow with maturing trilineage hematopoiesis   No morphologic or immunophenotypic evidence of a lymphoproliferative disorder   Flow cytometry shows no diagnostic immunophenotypic abnormalities     5/21/21 Lymph node, right supraclavicular, core biopsy:   Lymphoid tissue with no definitive morphologic, immunohistochemical, or flow cytometric evidence of   a lymphoproliferative disorder, see comment     Prior Treatment: None    Current Treatment: pending  Treatment duration   Frequency of visits     History of Present Illness:   Kelley St is a 80 y.o. male who comes in for follow up of lymphadenopathy. Pt has primary sclerosing cholangitis and follows with Dr. Gabbie Sanchez in GI. Has h/o of requiring biliary stents in the past. Currently does not have stent in place. He was having stomach discomfort and gas which led to a CT scan of abd/pelvis. Given finding of R hydronephrosis and referred to Urology. Dr. Edith Kehr evaluated further with CT IVP, which was notable for soft tissue infiltrative mass at aortic bifurcation and R femoral arteries. No n/v/d, night sweats, recurrent fevers, unintentional weight loss. Does have dull R back and flank pain. Patient is [place] and is accompanied by his wife Alem Meyer, and son Rosario Hamm. Rosario Hamm is translating today. Interval History:  Pt underwent BM biopsy and supraclavicular LN biopsy since our last visit. He is scheduled to see Dr. Edith Kehr and get repeat ultrasound on 6/16/21.  He reports some difficulty with holding urine / urinary incontinence, stating he has to rush to the bathroom at times. He denies fevers, chills, sweats, unintentional weight loss, n/v/d, abdom or back pain. Past Medical History:   Diagnosis Date    Liver disease     cholangitis      Past Surgical History:   Procedure Laterality Date    HX OTHER SURGICAL      Surgury on testicles    MO ABDOMEN SURGERY PROC UNLISTED      hernia repair        Review of Systems: A complete review of systems was obtained, negative except as described above. Physical Exam:       Due to this being a TeleHealth evaluation, many elements of the physical examination are unable to be assessed. Constitutional: Appears well-developed and well-nourished in no apparent distress   Mental status: Alert and awake, Oriented to person/place/time, Able to follow commands  Eyes: EOM normal, Sclera normal, No visible ocular discharge  HENT: Normocephalic, atraumatic; Mouth/Throat: Moist mucous membranes, External Ears normal  Neck: No visualized mass  Pulmonary/Chest: Respiratory effort normal, No visualized signs of difficulty breathing or respiratory distress   Musculoskeletal: Normal gait with no signs of ataxia, Normal range of motion of neck  Neurological: No facial asymmetry (Cranial nerve 7 motor function), No gaze palsy  Skin: No significant exanthematous lesions or discoloration noted on facial skin  Psychiatric: Normal affect, normal judgment/insight. No hallucinations         Results:     Lab Results   Component Value Date/Time    WBC 5.7 05/21/2021 10:30 AM    HGB 11.3 (L) 05/21/2021 10:30 AM    HCT 36.1 (L) 05/21/2021 10:30 AM    PLATELET 717 15/75/5695 10:30 AM    MCV 96.3 05/21/2021 10:30 AM    ABS.  NEUTROPHILS 2.7 05/21/2021 10:30 AM     Lab Results   Component Value Date/Time    Sodium 141 04/13/2021 02:54 PM    Potassium 4.6 04/13/2021 02:54 PM    Chloride 109 (H) 04/13/2021 02:54 PM    CO2 27 04/13/2021 02:54 PM    Glucose 119 (H) 04/13/2021 02:54 PM    BUN 33 (H) 04/13/2021 02:54 PM    Creatinine 1.63 (H) 04/13/2021 02:54 PM    GFR est AA 51 (L) 04/13/2021 02:54 PM    GFR est non-AA 42 (L) 04/13/2021 02:54 PM    Calcium 9.1 04/13/2021 02:54 PM    Glucose (POC) 109 (H) 04/27/2021 09:00 AM     Lab Results   Component Value Date/Time    Bilirubin, total 0.4 04/13/2021 02:54 PM    ALT (SGPT) 16 04/13/2021 02:54 PM    Alk. phosphatase 97 04/13/2021 02:54 PM    Protein, total 7.3 05/21/2021 10:30 AM    Albumin 4.3 04/13/2021 02:54 PM    Globulin 3.4 04/13/2021 02:54 PM     No results found for: IRON, FE, TIBC, IBCT, PSAT, FERR    No results found for: B12LT, FOL, RBCF  No results found for: TSH, TSH2, TSH3, TSHP, TSHEXT, TSHEXT  Lab Results   Component Value Date/Time    Hepatitis B surface Ag <0.10 04/13/2021 02:54 PM    Hepatitis B surface Ab 85.33 05/21/2021 10:30 AM    Hep B Core Ab, total Negative 04/13/2021 02:54 PM     Lab Results   Component Value Date/Time    Uric acid 8.1 (H) 04/13/2021 02:54 PM     5/21/21: HIV negative    5/21/21: SPEP/JESENIA and free light chains: M-spike 0.3, KLC 49, LLC 22, k/l ratio 2.09  Immunofixation shows an area of restricted mobility in the IgG   region. No light chain association can be confirmed at this time. Suggestion of Heavy Chain Disease. Imaging:     Imaging at [facility]:  4/2/21 CT IVP:  Impression:   1. Abnormal infiltrative soft tissue around the aortic bifurcation and proximal common femoral arteries. Different considerations include retroperitoneal fibrosis or lymphoproliferative disorder. No other sites of pathologic lymphadenopathy. 2. Significant R hydronephrosis. The right mid ureter is obstructed at the level of the infiltrative periaortic soft tissue. 3. No left hydronephrosis. 4. No obstructing stones  5. No suspicious collecting system or urinary bladder lesions. 4/27/21 PET:  IMPRESSION  1.  There is increased soft tissue density at the aortic bifurcation with slight  increased metabolic activity of 4.2 which is nonspecific and could be   inflammatory/retroperitoneal fibrosis although low-grade neoplastic process is  not excluded and continued follow-up would be helpful. 2. There are normal sized mediastinal and hilar lymph nodes with slight  increased metabolic activity of 2.8 most likely reactive but again close  follow-up would be helpful. .  There is also a 1 cm right supraclavicular lymph node with slight increased  metabolic activity of 3.2 again nonspecific and this is potentially amenable to  fine-needle aspiration with ultrasound guidance as is clinically indicated. 6/9/21 CT abd/pelv w cont:  IMPRESSION  Infiltrative retroperitoneal mass, encasing the right common iliac artery and right ureter, demonstrates no significant change in size since the April 27, 2021 examination. There is moderately severe right-sided hydronephrosis and hydroureter; the right ureteral stent is in satisfactory position. 5 mm right lower lobe pleural-based pulmonary nodule demonstrates no significant change since April 2021. Assessment & Plan:   Carmelita Moya is a 80 y.o. male is seen for RP soft tissue mass, LAD. 1. Lymphadenopathy / Retroperitoneal soft tissue mass:  Located near aortic bifurcation and R femoral artery, causing compression of R ureter, leading to hydronephrosis. This region is somewhat suspicious for lymphoma, but could also be retroperitoneal fibrosis or other etiology. PET only shows mildly hypermetabolic uptake in RP mass and Right supraclavicular region - these findings are not consistent with an aggressive lymphoma - but do not differentiate between a low grade lymphoma vs RP fibrosis. Normal LDH, but elevated uric acid 8.1. Hep B/C profiles appropriate negative. Biopsy of RP mass not feasible by IR. I discussed case with Dr. Krista Parker in Urology extensively again.  Our concern is that Vascular surgery approach would be a major surgery with large open incision - too invasive given this is a slow growing mass. Dr. Suly Finn may be able to pursue a laparascopic biopsy, but is unsure whether an adequate sample could be obtained. Bone marrow biopsy and Right supraclavicular LN biopsies were negative for lymphoma. However, this does not necessarily help in differentiating between lymphoma vs RP fibrosis near the aortic bifurcation. At this point, laparascopic biopsy during uterolysis procedure vs empiric treatment with steroids, which may shrink mass, but we will not know diagnosis and mass may return when steroids are weaned. If this is a low grade lymphoma, treatment may involve single agent Rituxan or other regimen. If this is a RP fibrosis, steroids alone may be treatment (Rheumatology may know more.) If this is an autoimmune disease causing the soft tissue mass, initial steroids followed by other immunosuppressants based on diagnosis (I.e. Hydroxychloroquine, CellCept, etc.)  -- I recommend biopsy of soft tissue mass if feasible to establish diagnosis. Will await Urology plan. -- Although treatment with steroids may help decrease mass, we would not know if we are treating low grade lymphoma, retroperitoneal fibrosis or an autoimmune disorder. -- Return in 6-8 weeks for follow up (can be virtual visit)  -- Refer to Rheumatology    2. R hydronephrosis:   Likely due to soft tissue mass/LAD. R ureteral stent placed 4/14/21 by Dr. Suly Finn. Per Urology, trying larger ureteral stent first. If no improvement in hydronephrosis, will try for percutaneous nephrostomy. -- Going for ureteral stent replacement with larger stent next week. -- If larger stent does not relieve R hydronephrosis, I prefer uterolysis and biopsy of soft tissue mass. 3. Primary sclerosing cholangitis: On Tacrolimus since approx 2yrs. Follows with Dr. Farideh Swanson. 4. R flank pain: Mild and likely due to hydronephrosis vs soft tissue mass. 5. Abnormal serum JESENIA:  Suggestive of Heavy Chain Disease, but this requires tissue diagnosis.      Emotional well being: Pt is coping well with his/her disease and has excellent support. I appreciate the opportunity to participate in Mr. Jacklyn Trevizo's care. Total physician time spent on this encounter was 60 minutes. The patient was evaluated through a synchronous (real-time) audio-video encounter. The patient (or guardian if applicable) is aware that this is a billable service. Verbal consent to proceed has been obtained within the past 12 months. The visit was conducted pursuant to the emergency declaration under the [address] [place] [address], [address] [place] [address] and the [employer] Act. Patient identification was verified, and a caregiver was present when appropriate. The patient was located in a state where the provider was credentialed to provide care. I personally spent a total of 60 minutes on patient care on date of this encounter, regarding the following:  Reviewing prior medical records which I summarized in the history above, discussing diagnoses with the patient as listed in the assessment, ordering blood tests and imaging as listed in the plan, arranging follow up as well as documentation of this encounter. Discussed case with Hematology tumor board, Dr. Violeta Bose in GI, and Dr. Pablo Florian in Urology. Difficult diagnostic case.           Signed By: Meg Corbett MD     June 17, 2021

## 2021-06-21 ENCOUNTER — TELEPHONE (OUTPATIENT)
Dept: RHEUMATOLOGY | Age: 71
End: 2021-06-21

## 2021-06-21 ENCOUNTER — TELEPHONE (OUTPATIENT)
Dept: ONCOLOGY | Age: 71
End: 2021-06-21

## 2021-06-22 ENCOUNTER — OFFICE VISIT (OUTPATIENT)
Dept: RHEUMATOLOGY | Age: 71
End: 2021-06-22
Payer: MEDICARE

## 2021-06-22 VITALS
BODY MASS INDEX: 23.41 KG/M2 | HEIGHT: 61 IN | SYSTOLIC BLOOD PRESSURE: 138 MMHG | RESPIRATION RATE: 18 BRPM | HEART RATE: 51 BPM | WEIGHT: 124 LBS | TEMPERATURE: 98 F | DIASTOLIC BLOOD PRESSURE: 79 MMHG

## 2021-06-22 DIAGNOSIS — N13.30 HYDRONEPHROSIS, UNSPECIFIED HYDRONEPHROSIS TYPE: ICD-10-CM

## 2021-06-22 DIAGNOSIS — D89.89 IGG4 RELATED DISEASE (HCC): ICD-10-CM

## 2021-06-22 DIAGNOSIS — N13.5 RETROPERITONEAL FIBROSIS: ICD-10-CM

## 2021-06-22 DIAGNOSIS — K83.09 IGG4-RELATED SCLEROSING CHOLANGITIS: Primary | ICD-10-CM

## 2021-06-22 PROCEDURE — 3017F COLORECTAL CA SCREEN DOC REV: CPT | Performed by: INTERNAL MEDICINE

## 2021-06-22 PROCEDURE — G8432 DEP SCR NOT DOC, RNG: HCPCS | Performed by: INTERNAL MEDICINE

## 2021-06-22 PROCEDURE — 1101F PT FALLS ASSESS-DOCD LE1/YR: CPT | Performed by: INTERNAL MEDICINE

## 2021-06-22 PROCEDURE — G8427 DOCREV CUR MEDS BY ELIG CLIN: HCPCS | Performed by: INTERNAL MEDICINE

## 2021-06-22 PROCEDURE — G8536 NO DOC ELDER MAL SCRN: HCPCS | Performed by: INTERNAL MEDICINE

## 2021-06-22 PROCEDURE — 99205 OFFICE O/P NEW HI 60 MIN: CPT | Performed by: INTERNAL MEDICINE

## 2021-06-22 PROCEDURE — G8420 CALC BMI NORM PARAMETERS: HCPCS | Performed by: INTERNAL MEDICINE

## 2021-06-22 NOTE — LETTER
6/22/2021 Patient: Tiburcio Frank YOB: 1950 Date of Visit: 6/22/2021 Yohan Cook MD 
[address] [facility] [address] Via Fax: [phone] Dear Yohan Cook MD, Thank you for referring Mr. Nixon Trevizo to [address] [place] [address] for evaluation. My notes for this consultation are attached. If you have questions, please do not hesitate to call me. I look forward to following your patient along with you.  
 
 
Sincerely, 
 
Janice Lopez MD

## 2021-06-22 NOTE — PATIENT INSTRUCTIONS
Schedule CT abdomen 6 months after rituximab infusion. Please call the Patient Care Scheduling Team [phone] to schedule your test. 
 
Rituximab (By injection) Rituximab ([id number]) Treats rheumatoid arthritis (RA), Wegener granulomatosis, and microscopic polyangiitis (MPA). Also treats cancer, including lymphoma and leukemia. Brand Name(s): Rituxan There may be other brand names for this medicine. When This Medicine Should Not Be Used: You should not receive this medicine if you have had an allergic reaction to rituximab or other murine (mice or rat) proteins. How to Use This Medicine:  
Injectable · Medicines used to treat cancer are very strong and can have many side effects. Before receiving this medicine, make sure you understand all the risks and benefits. It is important for you to work closely with your doctor during your treatment. · You will receive this medicine while you are in a hospital or cancer treatment center. A nurse or other trained health professional will give you this medicine. · Your doctor will prescribe your dose and schedule. This medicine is given through a needle placed in a vein. · Rituximab must be given slowly, so the needle will remain in place for a few hours. You may also receive medicines (such as acetaminophen, antihistamines) to help prevent possible allergic reactions to the injection. · You will be watched closely for unwanted side effects while you are receiving this medicine. · This medicine should come with a Medication Guide. Ask your pharmacist for a copy if you do not have one. If a dose is missed:  
· Call your doctor or pharmacist for instructions. Drugs and Foods to Avoid: Ask your doctor or pharmacist before using any other medicine, including over-the-counter medicines, vitamins, and herbal products. · Make sure your doctor knows if you are also receiving cisplatin (Leroy Souza Ultramar 112).  Tell your doctor if you have taken other medicines to treat rheumatoid arthritis such as adalimumab (Humira®), etanercept (Enbrel®), or infliximab (Remicade®). · This medicine may interfere with vaccines. Ask your doctor before you get a flu shot or any other vaccines. Some vaccines may be given 4 weeks before a rituximab injection. Warnings While Using This Medicine: · Make sure your doctor knows if you are pregnant or planning to become pregnant. You must use an effective form of birth control to prevent pregnancy. You should not become pregnant while you are receiving this medicine and for 12 months after stopping it. · Make sure your doctor knows if you are breastfeeding, or if you have kidney disease, liver disease (including hepatitis B), chest pain (angina), heart disease, heart rhythm problems, or lung problems. Also tell your doctor if you have a weak immune system or any type of infection. · Tell your doctor if you have had a reaction to murine (mice or rat) proteins. Murine proteins are also used in other medicines. · Rituximab may cause a serious side effect called an infusion reaction. This can be life-threatening and requires immediate medical attention. Check with your doctor or nurse right away if you have a rash, itching, swelling of the face, tongue, and throat, trouble breathing, or chest pain. · This medicine may cause a serious reaction called tumor lysis syndrome. Call your doctor right away if you have changes in how often you urinate, rapid weight gain, swelling of the feet or lower legs, uneven heartbeat, or seizures. · If you have a severe skin reaction, tell your doctor right away. Symptoms may include blistering, peeling, or loosening of the skin, red skin lesions, severe acne or skin rash, sores or ulcers on the skin, or fever or chills. · This medicine may increase your risk of infections during treatment and up to a year after treatment. These infections can be severe and lead to death. Avoid being near people who are sick.  Wash your hands often. Tell your doctor if you have lupus or have ever had an infection that kept coming back. · Call your doctor right away if you have a cough that won't go away, weight loss, night sweats, fever, chills, flu-like symptoms (such as a runny or stuffy nose, headache, blurred vision, or feeling generally ill), painful or difficult urination, or sores, ulcers, or white spots in the mouth or on the lips. These may be signs of infection. · This medicine may cause a rare and serious brain infection called progressive multifocal leukoencephalopathy (PML). The risk for getting this infection is higher if you have rheumatoid arthritis. Check with your doctor right away if you have more than one of these symptoms: vision changes, loss of coordination, clumsiness, memory loss, difficulty speaking or understanding what others say, and weakness in the legs. · Check with your doctor right away if you have any symptoms of liver problems, including yellow skin and eyes, dark urine or pale stools, nausea and vomiting, or upper stomach pain. · Check with your doctor right away if you have abdominal pain. This medicine could cause serious stomach and bowel problems, such as a bowel obstruction or a hole in your bowel. · Your doctor will do lab tests at regular visits to check on the effects of this medicine. Keep all appointments. Possible Side Effects While Using This Medicine:  
Call your doctor right away if you notice any of these side effects: · Allergic reaction: Itching or hives, swelling in your face or hands, swelling or tingling in your mouth or throat, chest tightness, trouble breathing · Blistering, peeling, red skin rash · Bloody vomit or vomit that looks like coffee grounds, severe stomach pain · Changes in vision · Chest pain, uneven heartbeat, sudden fainting · Confusion, body weakness, shortness of breath, numbness or tingling in your hands, feet, or lips · Dark-colored urine or pale stools, nausea, vomiting, loss of appetite, pain in your upper stomach, yellow eyes or skin · Decrease in how much or how often you urinate, pain while urinating · Dizziness, lightheadedness, drowsiness · Fever, chills, cough, sore throat, and body aches · Joint pain, stiffness, or swelling · Problems with coordination or speech · Rapid weight gain, swelling in your hands, ankles, or feet · Sores, ulcers, or white spots in the mouth or on the lips · Unusual bleeding or bruising · Unusual tiredness or weakness If you notice these less serious side effects, talk with your doctor:  
· Headache · Mild skin rash or itching · Pain, itching, burning, swelling, or a lump under your skin where the needle is placed If you notice other side effects that you think are caused by this medicine, tell your doctor. Call your doctor for medical advice about side effects. You may report side effects to FDA at 8-000-FDA-3730 © 2017 [facility] Information is for End User's use only and may not be sold, redistributed or otherwise used for commercial purposes. The above information is an  only. It is not intended as medical advice for individual conditions or treatments. Talk to your doctor, nurse or pharmacist before following any medical regimen to see if it is safe and effective for you.

## 2021-06-22 NOTE — PROGRESS NOTES
REASON FOR VISIT    This is the initial evaluation for Mr. Gerry Person a 80 y.o. Sri Ashlee male for question of a retroperitoneal mass. The patient is referred to the [facility] at the request of Dr. Марина Modi. HISTORY OF PRESENT ILLNESS     I have reviewed and summarized old records from [address] [place] [address]. In end of 2012, he developed abdominal pain. He saw GI Dr. Mahesh Novak. In 8/09/2014, MRI/MRCP Abdomen with and without contrast showed The common bile is at upper limits of normal size at 6 mm. There is a short segment stricture in the region of the pancreatic head. There is no associated biliary ductal dilatation. Although there is no definite pancreatic head mass, there may be very subtle lower signal intensity within the pancreatic head on postcontrast imaging. This is a questionable finding. There is no intrahepatic biliary dilatation. No focal liver lesion. Liver spleen and kidneys are normal. No adenopathy. In 10/15/2015, CT Abdomen and Pelvis with contrast showed Visualized lower Chest: Lungs/Pleura: Within normal limits Heart/vessels: Normal. Abdomen and pelvis: Liver: Normal. Biliary: Post cholecystectomy. There is new intrahepatic severe biliary dilation with ducts converging at the level of the hilum where the lumen becomes completely collapse. No clearly evident mass is identified however there is suggestion of some enhancement of the duct because of the common hepatic duct just as it exits the area of collapse. Distally the common duct distends to approximately 9 mm to 10 taper within the pancreatic head at the site of previous stricture identified on MRI within the pancreatic head. Spleen: Normal. Pancreas: Normal.  No duct dilation. Adrenals: Normal. Urinary: Normal appearance to kidneys, ureters, and bladder. Peritoneum/Mesenteries: Normal. Gastrointestinal tract: Normal.  No dilation or inflammatory changes. Vascular: Atherosclerotic calcination is without aneurysm.  Reproductive System: Seminal vesicles and prostate appear unremarkable. MSK: Spondylosis with no aggressive lesions of bone. In 11/12/2015, MRI Abdomen with and without contrast showed Liver:  No steatosis. Interval development of diffuse intrahepatic biliary ductal dilatation related to central obstruction. Subtle mild T2 hyperintensity is seen centrally near the level of obstruction. In addition, there is ill-defined masslike enhancement centrally best seen on series 9 image 39 measuring approximately 3.3 x 4.0 cm. There is nodular soft tissue thickening and enhancement extending along the common hepatic duct into the common bile duct. Gallbladder:  Mildly thickwalled, but underdistended gallbladder. No filling defect. Spleen, pancreas, and adrenal glands:   Normal in signal. No focal abnormality. Kidneys:  Symmetric enhancement. No hydronephrosis. MRCP:  Severe, diffuse intrahepatic biliary ductal dilatation related to complete central common hepatic/common bile duct obstruction near the pennie hepatis. The common bile duct is patent the distal to the level of  obstruction all the way to the ampulla with no filling defect or stricture. Normal course and caliber of the pancreatic duct. Other findings:  No ascites. No lymphadenopathy. In 4/27/2016, Bile duct, biopsy: Benign bile duct epithelium with acute and chronic inflammation and reactive changes. Abundant granulation tissue. Negative for dysplasia or malignancy     In 10/11/2017, Bile duct, biopsy: Predominantly unremarkable small intestinal mucosa with preserved villous architecture. Scant fragment of benign bile duct mucosa with chronic inflammation     In 1/03/2018, Common bile duct biopsy: Scattered detached ductal epithelial cells with reactive type changes with background fibrin and blood. Ampullary biopsy: Benign ductal and small intestinal epithelium and stroma with reactive changes.     In 5/11/20218, Ampulla of Vater, biopsy: Small bowel and ampullary mucosa with chronic active inflammation. No malignancy identified     In 9/26/2018, he underwent ERCP with stent removal (after stent placements), with resolution of his abdominal pain. Sometime in 2018, he was started on prednisone and then tacrolimus 1 mg daily in 2019. In 4/13/2021, labs showed negative HBsAg, HBcAb, HCV    In 4/27/2021, PET/CT Scan showed HEAD/NECK: There is a 1 cm supraclavicular lymph node with SUV of 3.2. CHEST: There are normal sized mediastinal and hilar lymph nodes with slight increased metabolic activity of 2.8. ABDOMEN/PELVIS: Right ureteral stent is noted in position. There is increased soft tissue density at the aortic bifurcation with slight increased metabolic activity of 4. SKELETON: No foci of abnormal hypermetabolism in the axial and visualized appendicular skeleton. In 5/21/2021, Lymph node, right supraclavicular, core biopsy with touch preparation: Lymphoid tissue with no definitive morphologic, immunohistochemical, or flow cytometric evidence of a lymphoproliferative disorder. No cells diagnostic for malignancy. Bone marrow, posterior iliac crest, aspirate, clot section, and decalcified core biopsy: Normocellular marrow with maturing trilineage hematopoiesis. No morphologic or immunophenotypic evidence of a lymphoproliferative disorder. Flow cytometry shows no diagnostic immunophenotypic abnormalities. Peripheral Blood: Normochromic, normocytic anemia with no increase in schistocytes. Unremarkable white blood cells and platelets. 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 In 6/09/2021, CT Abdomen and Pelvis with contrast showed LOWER THORAX: 5 mm pleural-based nodule in the right lower lobe, unchanged since the April 27, 2021 PET CT. LIVER: No mass. BILIARY TREE: Gallbladder is within normal limits. CBD is not dilated. SPLEEN: within normal limits. PANCREAS: No mass or ductal dilatation. ADRENALS: Unremarkable.   KIDNEYS: Moderately severe right-sided hydronephrosis and hydroureter. Right-sided ureteral stent is in satisfactory position. STOMACH: Unremarkable. SMALL BOWEL: No dilatation or wall thickening. COLON: No dilatation or wall thickening. APPENDIX: Normal. PERITONEUM: No ascites or pneumoperitoneum. RETROPERITONEUM: Infiltrative retroperitoneal mass encases the right common iliac artery and right ureter; its largest component measures 2.8 x 2.6 cm, previously 2.7 x 2.7 cm. Subcentimeter retroperitoneal lymph nodes are not significantly changed. REPRODUCTIVE ORGANS: Unremarkable URINARY BLADDER: No mass or calculus. BONES: No destructive bone lesion. ABDOMINAL WALL: No mass or hernia. Today, he feels well. He has some discomfort in the right low back, where he has his ureteral obstruction. He lives in [place]. Therapy History includes:  Current DMARD therapy include: None  Prior DMARD therapy include: None  Discontinued DMARDs because of inefficacy: None  Discontinued DMARDs because of side effects: None    REVIEW OF SYSTEMS    A 15 point review of systems was performed and summarized below. The questionnaire was reviewed with the patient and scanned into the patient's medical record.     General: denies recent weight gain, recent weight loss, fatigue, weakness, fever, drenching night sweats  Musculoskeletal: endorses joint pain, denies joint swelling, morning stiffness, muscle pain  Ears: denies ringing in ears, hearing loss, deafness  Eyes: denies pain, light sensitive, redness, blindness, double vision, blurred vision, excess tearing, dryness, foreign body sensation  Mouth: denies sore tongue, oral ulcers, loss of taste, dryness, increased dental caries  Nose: denies nosebleeds, nasal ulcers  Throat: denies food stuck when swallowing, difficulty with swallowing, hoarseness, pain in jaw while chewing  Neck: denies swollen glands, tender glands  Cardiopulmonary: denies pain in chest with deep breaths, pain in chest when lying down, murmurs, sudden changes in heart beat, wheezing, dry cough, productive cough, shortness of breath at rest, shortness of breath on exertion, coughing of blood  Gastrointestinal: denies nausea, heartburn, stomach pain relieved by food, chronic constipation, chronic diarrhea, blood in stools, black stools  Genitourinary: denies pain or burning on urination, blood in urine, cloudy urine, penile ulcers  Hematologic: denies anemia, bleeding tendency, blood clots, bleeding gums  Skin: denies easy bruising, hair loss, rash, rash worsened after sun exposure, hives/urticaria, skin thickening, skin tightness, nodules/bumps, color changes of hands or feet in the cold (Raynaud's)  Neurologic: denies numbness or tingling in hands, numbness or tingling in feet, muscle weakness  Psychiatric: denies depression, excessive worries, PTSD, Bipolar  Sleep: endorses poor sleep, denies snoring, apnea, daytime somnolence, difficulty falling asleep, difficulty staying asleep     PAST MEDICAL HISTORY    He has a past medical history of Liver disease. FAMILY HISTORY    His family history includes Cancer in his brother, father, mother, and sister. SOCIAL HISTORY    He reports that he has never smoked. He has never used smokeless tobacco. He reports that he does not drink alcohol and does not use drugs. MEDICATIONS    Current Outpatient Medications   Medication Sig    carvediloL (COREG) 3.125 mg tablet Take  by mouth.  tamsulosin (FLOMAX) 0.4 mg capsule Take  by mouth.  tacrolimus (PROGRAF) 1 mg capsule Take 1 tab every day. 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  calcium-cholecalciferol, d3, (CALCIUM 600 + D) 600-125 mg-unit tab Take  by mouth.  Omega-3 Fatty Acids (FISH OIL) 300 mg cap Take  by mouth.  GLUCOSAM/CHOND/HYALU/CF BORATE (MOVE FREE JOINT HEALTH PO) Take  by mouth. No current facility-administered medications for this visit.      ALLERGIES    No Known Allergies    PHYSICAL EXAMINATION    Visit Vitals  /79   Pulse (!) 51   Temp 98 °F (36.7 °C)   Resp 18   Ht 5' 1\" (1.549 m)   Wt 124 lb (56.2 kg)   BMI 23.43 kg/m²     Body mass index is 23.43 kg/m². General: Patient is alert, oriented x 3, not in acute distress, son and wife at bedside    HEENT:   Conjunctiva are not injected and appear moist    Chest:  Breathing comfortably at room air    Musculoskeletal exam:  Tenderness along right flank    DATA REVIEW    Studies Reviewed:     Prior medical records were reviewed and are summarized as below:    Laboratory data: summarized in the HPI    Imaging: summarized in the HPI. 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 This is complicated with moderately severe right-sided hydronephrosis and hydroureter. He has a history of abdominal pain. Imaging showed severe, diffuse intrahepatic biliary ductal dilatation related to complete central common hepatic/common bile duct obstruction near the pennie hepatis. Biopsy was inconclusive (acute and chronic inflammation and reactive changes) but no IgG4 staining performed. This was treated with biliary stent placement. He was also treated with tacrolimus 1 mg daily. Years later, he then developed right flank pain and was found to have ureter obstruction due to mass and was referred to urology who placed a stent. I discussed with Dr. Dennis Shook, and I explained that I believe he had IgG4-related disease. No retroperitoneal mass biopsy was performed due to safety. I explained that serum IgG4 is not reliable for diagnosis and that biopsy with staining would be the diagnostic, but clinically, his presentation is consistent with IgG4-related disease. I discussed initiation with rituximab 1000 mg IV D1 and D15 followed with repeat dosing in 6 months and repeat CT abdomen/pelvis then. References:  1) Bea Acevedo [facility].  Rituximab therapy leads to rapid decline of serum IgG4 levels and prompt clinical improvement in IgG4-related systemic disease. Arthritis Rheum. 2010;62(6):1755.     2) Francisco Jeffery [facility]. Treatment approaches to IgG4-related systemic disease. Curr Opin Rheumatol. 2011;23(1):67. Curr Opin Rheumatol. 2011;23(1):67.     3) Tom Camacho [facility]. Rituximab for the treatment of IgG4-related disease: lessons from 10 consecutive patients. [facility]). 2012;91(1):57.     A total of 62 minutes was spent on this visit, reviewing interval notes, interval testing results, ordering tests, refilling medications, documenting the findings in the note, patient education, counseling, and coordination of care as described above. All questions asked and answered. The patient voiced understanding of the aforementioned assessment and plan. Summary of plan was provided in the After Visit Summary patient instructions. I also provided education about MyChart setup and utility.     TODAY'S ORDERS    Orders Placed This Encounter    CT ABD PELV W WO CONT    IGG SUBCLASSES    QUANTIFERON-TB PLUS(CLIENT INCUB.)     Future Appointments   Date Time Provider Marina Warner   7/13/2021  9:40 AM DONALD Sexton BS AMB   12/21/2021  8:40 AM Ashish White MD AOCR BS AMB     Marcy Cast MD, [address] [facility] [place] [facility] [place]    Adult Rheumatology   Rheumatology Ultrasound Certified  [facility]  A Part of [address]   Phone [phone]  Fax [phone]    Patient Care Team:  Kimberli Hutton MD as PCP - General (Internal Medicine)  Lisa Chen MD (Gastroenterology)  Guillermina Kinney MD (Gastroenterology)  Ambar Tay MD (Hematology and Oncology)  Ambrosio Villasenor MD (Urology)

## 2021-06-30 ENCOUNTER — DOCUMENTATION ONLY (OUTPATIENT)
Dept: RHEUMATOLOGY | Age: 71
End: 2021-06-30

## 2021-07-01 DIAGNOSIS — D89.89 IGG4 RELATED DISEASE (HCC): ICD-10-CM

## 2021-07-01 DIAGNOSIS — K83.09 IGG4-RELATED SCLEROSING CHOLANGITIS: ICD-10-CM

## 2021-07-01 DIAGNOSIS — N13.5 RETROPERITONEAL FIBROSIS: ICD-10-CM

## 2021-07-01 RX ORDER — SODIUM CHLORIDE 9 MG/ML
25 INJECTION, SOLUTION INTRAVENOUS CONTINUOUS
Status: CANCELLED | OUTPATIENT
Start: 2021-07-14

## 2021-07-01 RX ORDER — DIPHENHYDRAMINE HYDROCHLORIDE 50 MG/ML
50 INJECTION, SOLUTION INTRAMUSCULAR; INTRAVENOUS AS NEEDED
Status: CANCELLED
Start: 2021-07-14

## 2021-07-01 RX ORDER — ACETAMINOPHEN 325 MG/1
650 TABLET ORAL AS NEEDED
Status: CANCELLED
Start: 2021-07-14

## 2021-07-01 RX ORDER — ACETAMINOPHEN 325 MG/1
650 TABLET ORAL ONCE
Status: CANCELLED | OUTPATIENT
Start: 2021-07-14 | End: 2021-07-01

## 2021-07-01 RX ORDER — HYDROCORTISONE SODIUM SUCCINATE 100 MG/2ML
100 INJECTION, POWDER, FOR SOLUTION INTRAMUSCULAR; INTRAVENOUS AS NEEDED
Status: CANCELLED | OUTPATIENT
Start: 2021-07-14

## 2021-07-01 RX ORDER — HEPARIN 100 UNIT/ML
300-500 SYRINGE INTRAVENOUS AS NEEDED
Status: CANCELLED
Start: 2021-07-14

## 2021-07-01 RX ORDER — DIPHENHYDRAMINE HYDROCHLORIDE 50 MG/ML
50 INJECTION, SOLUTION INTRAMUSCULAR; INTRAVENOUS ONCE
Status: CANCELLED | OUTPATIENT
Start: 2021-07-14 | End: 2021-07-01

## 2021-07-01 RX ORDER — SODIUM CHLORIDE 0.9 % (FLUSH) 0.9 %
10 SYRINGE (ML) INJECTION AS NEEDED
Status: CANCELLED | OUTPATIENT
Start: 2021-07-14

## 2021-07-01 RX ORDER — SODIUM CHLORIDE 9 MG/ML
10 INJECTION INTRAMUSCULAR; INTRAVENOUS; SUBCUTANEOUS AS NEEDED
Status: CANCELLED | OUTPATIENT
Start: 2021-07-14

## 2021-07-01 RX ORDER — EPINEPHRINE 1 MG/ML
0.3 INJECTION, SOLUTION, CONCENTRATE INTRAVENOUS AS NEEDED
Status: CANCELLED | OUTPATIENT
Start: 2021-07-14

## 2021-07-01 RX ORDER — ALBUTEROL SULFATE 0.83 MG/ML
2.5 SOLUTION RESPIRATORY (INHALATION) AS NEEDED
Status: CANCELLED
Start: 2021-07-14

## 2021-07-01 RX ORDER — ONDANSETRON 2 MG/ML
8 INJECTION INTRAMUSCULAR; INTRAVENOUS AS NEEDED
Status: CANCELLED | OUTPATIENT
Start: 2021-07-14

## 2021-07-01 RX ORDER — DIPHENHYDRAMINE HYDROCHLORIDE 50 MG/ML
25 INJECTION, SOLUTION INTRAMUSCULAR; INTRAVENOUS AS NEEDED
Status: CANCELLED
Start: 2021-07-14

## 2021-07-12 NOTE — PROGRESS NOTES
[facility] Rheumatology  OBIC Note    Date: 2021    Rheumatology OBIC COVID-19 SCREENING  The patient was asked the following questions and answered as documented below:    1. Do you have any symptoms of COVID-19? SOB, coughing, fever, or generally not feeling well? NO  2. Have you been exposed to COVID-19 recently? NO  3. Have you had any recent contact with family/friend that has a pending COVID test? NO    Name: Sabina Donohue  MRN: [id number]       : 1950  Diagnosis: IgG4 Related disease  Treatment: Ruxience 1000mg (Day 1)    Patient arrived to [facility] at [phone]. Mr. Halle Mathur allergies reviewed and he agreed to receiving today's treatment. A physical assessment was performed initially and post-treatment. Pt. denies new complaints today. Spouse and other family member will be waiting in car during his treatment. Mr. Vivek Blue vitals were monitored before, during and after medication administration. Vitals:    21 1140 21 1210 21 1240 21 1310   BP: 135/77 (!) 146/72 (!) 147/84 (!) 144/74   Pulse: 62 (!) 53 65 65   Resp: 16 14 18 16   Temp:       SpO2: 98% 96% 98% 98%     No recent labs. CBC with diff drawn and walked to lab per therapy plan orders.      Medications given per providers orders:  Administrations This Visit     0.9% sodium chloride infusion     Admin Date  2021 Action  New Bag Dose  25 mL/hr Rate  25 mL/hr Route  IntraVENous Administered By  Lester Harden RN          acetaminophen (TYLENOL) tablet 650 mg     Admin Date  2021 Action  Given Dose  650 mg Route  Oral Administered By  Lester aHrden RN          diphenhydrAMINE (BENADRYL) injection 50 mg     Admin Date  2021 Action  Given Dose  50 mg Route  IntraVENous Administered By  Lester Harden RN          methylPREDNISolone (PF) (Solu-MEDROL) injection 125 mg     Admin Date  2021 Action  Given Dose  125 mg Route  IntraVENous Administered By  Lester Harden RN          riTUXimab-pvvr (RUXIENCE) 1,000 mg in 0.9% sodium chloride 1,000 mL infusion     Admin Date  07/14/2021 Action  New Bag Dose  1,000 mg Rate  50 mL/hr Route  IntraVENous Administered By  Miracle Ruiz RN          sodium chloride (NS) flush 10 mL     Admin Date  07/14/2021 Action  Given Dose  10 mL Route  IntraVENous Administered By  Miracle Ruiz RN            Acetaminophen 325mg tablets x 2 (650mg total)  NDC 90509-192-89  Lot # [id number]  Exp 07/2022    Benadryl 50mg IVP  NDC 25333-436-21  Lot # [id number]  Exp 03/2022    Solumedrol 125mg IVP  NDC 1173-3090-99  Lot # [id number]  Exp 07/2022    250ml bag 0.9% Normal Saline @ 25ml/hr for [address] 1764-6352-58  Lot # [id number]  Exp 11/2021    0.9% Normal Saline flush x 3  NDC 5429-7966-22  Lot # [id number]  Exp 01/2022    Ruxience 500mg x2 (1000mg total dose)  NDC 4697-9085-22  Lot # [id number]  Exp 09/30/2022  ---mixed in---  1000ml bag 0.9% Normal Saline  NDC N/A  Lot # [id number]  Exp  N/A    START TIME: 0269  100ml/hr @ 1010  150ml/hr @ 5236  200ml/hr @ 1110  250ml/hr @ 4714  300ml/hr @ 3010  350ml/hr @ 1100  400ml/hr @ 1310  STOP TIME: 1355    Lines:   24G Right lower FA PIV. Blood return noted and IV site assessed before, during, and after treatment. Line flushed with 10-30 ml's 0.9% Normal Saline solution per protocol. Access was removed from Mr. Cole Moses after completion of infusion. Mr. Cole Moses tolerated the treatment without complaints and there was no change to initial physical assessment. No medication reactions seen while in presence of this RN. Patient provided with AVS, which includes future appointments and written educational material regarding Ruxience. All of patients questions were answered. Mr. Cole Moses was discharged ambulatory with spouse and son from Rheumatology OBIC in stable condition at 1430.      Future Appointments   Date Time Provider Marina Warner   7/27/2021  9:00 AM INFUSIONINJ_RC AOCR BS AMB   9/21/2021  9:00 AM Josemanuel Cordon MD LIVR BS AMB 12/21/2021  8:40 AM Austin White MD AOCR BS AMB     After day 15 treatment, pt will return in 6 months for maintenance dosing  Laura Tejada RN  July 14, 2021  3:06 PM

## 2021-07-13 ENCOUNTER — OFFICE VISIT (OUTPATIENT)
Dept: HEMATOLOGY | Age: 71
End: 2021-07-13
Payer: MEDICARE

## 2021-07-13 VITALS
DIASTOLIC BLOOD PRESSURE: 75 MMHG | WEIGHT: 127.4 LBS | HEART RATE: 56 BPM | SYSTOLIC BLOOD PRESSURE: 125 MMHG | HEIGHT: 61 IN | OXYGEN SATURATION: 97 % | RESPIRATION RATE: 16 BRPM | TEMPERATURE: 96.9 F | BODY MASS INDEX: 24.05 KG/M2

## 2021-07-13 DIAGNOSIS — K75.4 AUTOIMMUNE HEPATITIS (HCC): Primary | ICD-10-CM

## 2021-07-13 PROCEDURE — G8432 DEP SCR NOT DOC, RNG: HCPCS | Performed by: PHYSICIAN ASSISTANT

## 2021-07-13 PROCEDURE — 99214 OFFICE O/P EST MOD 30 MIN: CPT | Performed by: PHYSICIAN ASSISTANT

## 2021-07-13 PROCEDURE — G8427 DOCREV CUR MEDS BY ELIG CLIN: HCPCS | Performed by: PHYSICIAN ASSISTANT

## 2021-07-13 PROCEDURE — 91200 LIVER ELASTOGRAPHY: CPT | Performed by: PHYSICIAN ASSISTANT

## 2021-07-13 PROCEDURE — G8420 CALC BMI NORM PARAMETERS: HCPCS | Performed by: PHYSICIAN ASSISTANT

## 2021-07-13 PROCEDURE — 1101F PT FALLS ASSESS-DOCD LE1/YR: CPT | Performed by: PHYSICIAN ASSISTANT

## 2021-07-13 PROCEDURE — G8536 NO DOC ELDER MAL SCRN: HCPCS | Performed by: PHYSICIAN ASSISTANT

## 2021-07-13 PROCEDURE — 3017F COLORECTAL CA SCREEN DOC REV: CPT | Performed by: PHYSICIAN ASSISTANT

## 2021-07-13 NOTE — PROGRESS NOTES
Identified pt with two pt identifiers(name and ). Reviewed record in preparation for visit and have obtained necessary documentation. No chief complaint on file. Vitals:    21 0924   BP: 125/75   Pulse: (!) 56   Resp: 16   Temp: 96.9 °F (36.1 °C)   TempSrc: Temporal   SpO2: 97%   Weight: 127 lb 6.4 oz (57.8 kg)   Height: 5' 1\" (1.549 m)   PainSc:   0 - No pain       Health Maintenance Review: Patient reminded of \"due or due soon\" health maintenance. I have asked the patient to contact his/her primary care provider (PCP) for follow-up on his/her health maintenance. Coordination of Care Questionnaire:  :   1) Have you been to an emergency room, urgent care, or hospitalized since your last visit? If yes, where when, and reason for visit? no       2. Have seen or consulted any other health care provider since your last visit? If yes, where when, and reason for visit? YES. f/u with Oncologist and Rheumatologist       Patient is accompanied by son I have received verbal consent from Angel Leiva to discuss any/all medical information while they are present in the room.

## 2021-07-13 NOTE — PROGRESS NOTES
[facility], MD, Zan Bone, Wyman Ka, MD Caryle Bos, PA-C Willard Bees, [facility]     Trena Cornell, [facility]   Itzel Santana [facility] Paredes [facility]       Leroy Pa[place] [address]    at [address], [address] [place] [address], [facility] 22.    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 Romi Garcia returns to the [place] [address] for management of autoimmune hepatitis. The active problem list, all pertinent past medical history, medications, endoscopic studies, radiologic findings and laboratory findings related to the liver disorder were reviewed with the patient. The patient also has autoimmune pancreatitis. Both autoimmune diseases were treated with prednisone and Imuran in the past, however when the prednisone dose was lowered, both the autoimmune hepatitis and pancreatitis would flare.  He was started on tacrolimus in 1/2016 and with this medication, he has had great response and has been able to discontinue prednisone and Imuran. He is tolerating tacrolimus well with single 1 mg daily administration. His liver enzymes and function have both remained normal with his current dose in reviewing labs from last week. 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 Repeat Fibroscans annually have demonstrated portal fibrosis/mild liver disease after successful treatment of AIH. MRI of the liver was performed in 11/2015. There was a stricture of the common bile duct. Dr. Марина Corado treated this with a stent but it did not remain open so he placed a metal stent in the PD in 4/2016. In October 2017, Dr. Марина Corado went back in and placed another stent and removed a bile duct stone with no complications. In September 2018, Dr. Марина Corado successfully removed the biliary stent and some sludge, no additional stenting done at that time or since. Patient was found in 2/2021 to have right hydronephrosis and LAD on CT scan. This led to evaluation to include stenting of the ureter and LN and bone marrow biopsy - both of which were negative. Patient has been seen to have retroperitoneal mass on the right causing compression of the ureter and has had evaluation with Dr Chey Frank. He believes that the patient has Ig4-related sclerosing cholangitis with retroperitoneal mass. He is scheduled for Rituxin infusion tomorrow. The patient has no limitations in functional activities which can be attributed to the liver disease. Today, patient denies change in bowel habits, dark urine, myalgias, arthralgias, pruritus and problems concentrating. He has mild right flank/back pressure. ALLERGIES:  No Known Allergies    MEDICATIONS  Current Outpatient Medications   Medication Sig    carvediloL (COREG) 3.125 mg tablet Take  by mouth.  tamsulosin (FLOMAX) 0.4 mg capsule Take  by mouth.  tacrolimus (PROGRAF) 1 mg capsule Take 1 tab every day.   Indications: Autoimmune hepatitis refractory to prednsione    Lactobacillus acidophilus (PROBIOTIC PO) Take by mouth.  calcium-cholecalciferol, d3, (CALCIUM 600 + D) 600-125 mg-unit tab Take  by mouth.  Omega-3 Fatty Acids (FISH OIL) 300 mg cap Take  by mouth.  GLUCOSAM/CHOND/HYALU/CF BORATE (MOVE FREE JOINT HEALTH PO) Take  by mouth. No current facility-administered medications for this visit. SYSTEM REVIEW NOT RELATED TO LIVER DISEASE OR REVIEWED ABOVE:  Constitution systems: Negative for fever, chills, weight gain, weight loss. Eyes: Negative for visual changes. ENT: Negative for sore throat, painful swallowing. Respiratory: Negative for cough, hemoptysis, SOB. Cardiology: Negative for chest pain, palpitations. GI:  Negative for constipation or diarrhea. Occasional heartburn symptoms. : Negative for urinary frequency, dysuria, hematuria, nocturia. Skin: Negative for rash. Hematology: Negative for easy bruising, blood clots. Musculo-skeletal: Negative for back pain, muscle pain, weakness. Neurologic: Negative for headaches, dizziness, vertigo, memory problems not related to HE. Psychology: Negative for anxiety, depression. FAMILY HISTORY:  The father  of colon cancer. The mother has the following chronic diseases: has an autoimmune disease. There is no family history of liver disease. There is a family history of autoimmune diseases. SOCIAL HISTORY:  The patient is . The patient has 2 children. The patient has never used tobacco products. The patient has never consumed significant amounts of alcohol. The patient currently works full time in the [employer] at [employer]. PHYSICAL EXAMINATION:  Visit Vitals  /75 (BP 1 Location: Right upper arm, BP Patient Position: Sitting, BP Cuff Size: Adult)   Pulse (!) 56   Temp 96.9 °F (36.1 °C) (Temporal)   Resp 16   Ht 5' 1\" (1.549 m)   Wt 127 lb 6.4 oz (57.8 kg)   SpO2 97%   BMI 24.07 kg/m²     General: No acute distress. Eyes: Sclera anicteric. ENT: No oral lesions. Nodes: No adenopathy. Skin: No spider angiomata. No jaundice. No palmar erythema. Respiratory: Lungs clear to auscultation. Cardiovascular: Regular heart rate. No murmurs. No JVD. Abdomen: Soft non-tender. Liver size normal to percussion/palpation. Spleen not palpable. No obvious ascites. Extremities: No edema. No muscle wasting. No gross arthritic changes. Neurologic: Alert and oriented. Cranial nerves grossly intact. No asterixis. LABORATORY STUDIES:  Liver [address] & Units 5/21/2021 4/13/2021   WBC 4.1 - 11.1 K/uL 5.7 6.5   ANC 1.8 - 8.0 K/UL 2.7 3.6   HGB 12.1 - 17.0 g/dL 11.3 (L) 10.9 (L)    - 400 K/uL 246 257   AST 15 - 37 U/L  10 (L)   ALT 12 - 78 U/L  16   Alk Phos 45 - 117 U/L  97   Bili, Total 0.2 - 1.0 MG/DL  0.4   Bili, Direct 0.00 - 0.40 mg/dL     Albumin 3.5 - 5.0 g/dL  4.3   BUN 6 - 20 MG/DL  33 (H)   Creat 0.70 - 1.30 MG/DL  1.63 (H)   Na 136 - 145 mmol/L  141   K 3.5 - 5.1 mmol/L  4.6   Cl 97 - 108 mmol/L  109 (H)   CO2 21 - 32 mmol/L  27   Glucose 65 - 100 mg/dL  119 (H)     Liver [place] Latest Ref Rng & Units 12/7/2020   WBC 4.1 - 11.1 K/uL 7.6   ANC 1.8 - 8.0 K/UL    HGB 12.1 - 17.0 g/dL 12.7 (L)    - 400 K/uL 266   AST 15 - 37 U/L 18   ALT 12 - 78 U/L 12   Alk Phos 45 - 117 U/L 79   Bili, Total 0.2 - 1.0 MG/DL 0.7   Bili, Direct 0.00 - 0.40 mg/dL 0.20   Albumin 3.5 - 5.0 g/dL 4.6   BUN 6 - 20 MG/DL 17   Creat 0.70 - 1.30 MG/DL 0.98   Na 136 - 145 mmol/L 141   K 3.5 - 5.1 mmol/L 5.1   Cl 97 - 108 mmol/L 103   CO2 21 - 32 mmol/L 25   Glucose 65 - 100 mg/dL 75     From 12/2019  AST/ALT/ALP/T Bili/ALB: 21/13/93/0.5/5.1  WBC/HB/PLT/INR:5.8/13.5/299  BUN/CREAT:20/0.99  Tac: 5.8    SEROLOGIES:  10/2013. HBsAntigen negative, anti-HBcore positive, anti-HBsurface positive, HBE antigen negative, Anti-HBE positive,HBV DNA undetectable, Ferritin 797.     Serologies Latest Ref Rng 6/3/2014   APRIL, IFA  See patterns   APRIL Pattern  1:160 (H)   C-ANCA Neg:<1:20 titer <1:20   P-ANCA Neg:<1:20 titer <1:20   ANCA Neg:<1:20 titer <1:20   ASMCA 0 - 19 Units 25 (H)   M2 Ab 0.0 - 20.0 Units 3.8     LIVER HISTOLOGY:  2013. Reviewed by MLS. Moderate inflammation with stage 3 fibrosis. 8/2016. FibroScan performed at [facility]. EkPa was 7.4. Suggested fibrosis level is F1.  11/2017. FibroScan performed at [facility]. EkPa was 8.1. Suggested fibrosis level is F1.  1/2019. FibroScan performed at [facility]. EkPa was 6.3. Suggested fibrosis level is F1. CAP score is 330, suggestive of significant steatosis. 12/2019. FibroScan performed at [facility]. EkPa was 6.6. Suggested fibrosis level is F0-1. CAP score is 286, this is consistent with steatosis. 7/2021. FibroScan performed at [facility]. EkPa was 5.4. Suggested fibrosis level is F0-1. CAP score 181, steatosis. ENDOSCOPIC PROCEDURES:  5/2013. EGD by Dr Adelia Schrader. Gastritis and small gastric ulcer. 7/2013. EGD by Dr Adelia Schrader. Healed gastritis and .  12/2015. ERCP. Common bile duct stricture. Stent placed. 4/2016. ERCP by Dr Rebeca Rodriguez. Pancreatic stricture. Covered metal stent placed. 11/2017. ERCP By Dr. Rebeca Rodriguez. Bile duct stricture and stented. Bile duct stone-extracted. 5/2018. ERCP by Dr. Rebeca Rodriguez. Bile duct stricture-stent changed. Abnormal mucosa ampulla-biopsied  9/2018. ERCP by Dr. Rebeca Rodriguez. Bile duct stricture-resolved. Biliary sludge-extracted. RADIOLOGY:  10/2013. MRI of liver. Diffuse swelling of pancreas with peripancreatic edema. Encasement of CDB by edematous pancreas head. Normal appearing liver. No liver mass lesions. Normal spleen. No dilated bile ducts. No bile duct strictures. No ascites. 8/2014. MRI of liver. Short yet severe stricture of the common duct within the pancreatic head without associated pancreatic ductal or intrahepatic biliary dilatation.  There is questionable subtle decreased contrast enhancement within the pancreatic head. ERCP with endoscopic ultrasound suggested for further evaluation. 11/2015. MRCP. Obstruction of CBD in region of pennie hepatitis. Distal CBD is normal. Proximal to obstruction the intrahepatic bile ducts are dilated. 6/2019. MRI performed in [facility]. Per patient no findings, possible pancreatic atrophy.  2/2021. CT abdomen. No liver lesion noted. Right hydronephrosis seen. 6/2021. CT abdomen. Infiltrative retroperitoneal mass, encasing the right common iliac artery and  right ureter, demonstrates no significant change in size since the April 27, 2021 examination. There is moderately severe right-sided hydronephrosis and  hydroureter; the right ureteral stent is in satisfactory position. 5 mm right  lower lobe pleural-based pulmonary nodule demonstrates no significant change  since April 2021. OTHER TESTING:  Cancer Screening Latest Ref Rng & Units 10/11/2017 6/3/2014   CA 19-9 0 - 35 U/mL 8 69 (H)   CEA ng/mL 0.5    CEA 0.0 - 4.7 ng/mL  1.2   7/2013.  17. ASSESSMENT AND PLAN:  Autoimmune hepatitis with bridging fibrosis in 2013. Repeat FibroScans have demonstrated improvement/stabilization in his liver disease after treatment with a fibrosis score of stage 1 out of 4. Recent labs have continued to show normal liver enzymes. He will hold additional tacrolimus as he is having IV infusions of tacrolimus. Discussed that he will continue with follow-up with Dr Rika Tapia and I will review his case and ? Ig4-related sclerosis with Dr Marizol Eisenberg when he returns to the office this week. I will have the patient follow-up with Dr Marizol Eisenberg in 2 months as well. Fibrosis has been checked annually without significant progression. Will plan for repeat Fibroscan at follow-up in 1 year. Liver enzymes and liver function remain within normal range. Biliary obstruction. Removal of stent by Dr Linford Lefort in 9/2018 and no further indication for replacement at present.       The patient was directed to continue all current medications at the current dosages. There are no contraindications for the patient to take any medications that are necessary for treatment of other medical issues. All of the above issues were discussed with the patient. All questions were answered. The patient expressed a clear understanding of the above. Follow-up [facility] [address] in 2 months for evaluation with Dr Belinda Rodriguez.        Clara Ashley PA-C  Liver [address], [address] [place]  22.  [phone]

## 2021-07-14 ENCOUNTER — OFFICE VISIT (OUTPATIENT)
Dept: RHEUMATOLOGY | Age: 71
End: 2021-07-14
Payer: MEDICARE

## 2021-07-14 VITALS
DIASTOLIC BLOOD PRESSURE: 72 MMHG | SYSTOLIC BLOOD PRESSURE: 143 MMHG | HEART RATE: 85 BPM | OXYGEN SATURATION: 98 % | TEMPERATURE: 97.9 F | RESPIRATION RATE: 16 BRPM

## 2021-07-14 DIAGNOSIS — K83.09 IGG4-RELATED SCLEROSING CHOLANGITIS: Primary | ICD-10-CM

## 2021-07-14 DIAGNOSIS — N13.5 RETROPERITONEAL FIBROSIS: ICD-10-CM

## 2021-07-14 DIAGNOSIS — D89.89 IGG4 RELATED DISEASE (HCC): ICD-10-CM

## 2021-07-14 PROCEDURE — 96415 CHEMO IV INFUSION ADDL HR: CPT

## 2021-07-14 PROCEDURE — 96372 THER/PROPH/DIAG INJ SC/IM: CPT

## 2021-07-14 PROCEDURE — 96413 CHEMO IV INFUSION 1 HR: CPT

## 2021-07-14 RX ORDER — ONDANSETRON 2 MG/ML
8 INJECTION INTRAMUSCULAR; INTRAVENOUS AS NEEDED
Status: CANCELLED | OUTPATIENT
Start: 2021-07-28

## 2021-07-14 RX ORDER — SODIUM CHLORIDE 0.9 % (FLUSH) 0.9 %
10 SYRINGE (ML) INJECTION AS NEEDED
Status: DISCONTINUED | OUTPATIENT
Start: 2021-07-14 | End: 2021-07-14 | Stop reason: HOSPADM

## 2021-07-14 RX ORDER — DIPHENHYDRAMINE HYDROCHLORIDE 50 MG/ML
50 INJECTION, SOLUTION INTRAMUSCULAR; INTRAVENOUS AS NEEDED
Status: CANCELLED
Start: 2021-07-28

## 2021-07-14 RX ORDER — ACETAMINOPHEN 325 MG/1
650 TABLET ORAL ONCE
Status: CANCELLED | OUTPATIENT
Start: 2021-07-28 | End: 2021-07-28

## 2021-07-14 RX ORDER — ACETAMINOPHEN 325 MG/1
650 TABLET ORAL ONCE
Status: COMPLETED | OUTPATIENT
Start: 2021-07-14 | End: 2021-07-14

## 2021-07-14 RX ORDER — DIPHENHYDRAMINE HYDROCHLORIDE 50 MG/ML
50 INJECTION, SOLUTION INTRAMUSCULAR; INTRAVENOUS ONCE
Status: CANCELLED | OUTPATIENT
Start: 2021-07-28 | End: 2021-07-28

## 2021-07-14 RX ORDER — HYDROCORTISONE SODIUM SUCCINATE 100 MG/2ML
100 INJECTION, POWDER, FOR SOLUTION INTRAMUSCULAR; INTRAVENOUS AS NEEDED
Status: CANCELLED | OUTPATIENT
Start: 2021-07-28

## 2021-07-14 RX ORDER — ALBUTEROL SULFATE 0.83 MG/ML
2.5 SOLUTION RESPIRATORY (INHALATION) AS NEEDED
Status: CANCELLED
Start: 2021-07-28

## 2021-07-14 RX ORDER — SODIUM CHLORIDE 9 MG/ML
10 INJECTION INTRAMUSCULAR; INTRAVENOUS; SUBCUTANEOUS AS NEEDED
Status: CANCELLED | OUTPATIENT
Start: 2021-07-28

## 2021-07-14 RX ORDER — DIPHENHYDRAMINE HYDROCHLORIDE 50 MG/ML
25 INJECTION, SOLUTION INTRAMUSCULAR; INTRAVENOUS AS NEEDED
Status: CANCELLED
Start: 2021-07-28

## 2021-07-14 RX ORDER — SODIUM CHLORIDE 9 MG/ML
25 INJECTION, SOLUTION INTRAVENOUS CONTINUOUS
Status: DISCONTINUED | OUTPATIENT
Start: 2021-07-14 | End: 2021-07-14 | Stop reason: HOSPADM

## 2021-07-14 RX ORDER — SODIUM CHLORIDE 9 MG/ML
25 INJECTION, SOLUTION INTRAVENOUS CONTINUOUS
Status: CANCELLED | OUTPATIENT
Start: 2021-07-28

## 2021-07-14 RX ORDER — ACETAMINOPHEN 325 MG/1
650 TABLET ORAL AS NEEDED
Status: CANCELLED
Start: 2021-07-28

## 2021-07-14 RX ORDER — SODIUM CHLORIDE 0.9 % (FLUSH) 0.9 %
10 SYRINGE (ML) INJECTION AS NEEDED
Status: CANCELLED | OUTPATIENT
Start: 2021-07-28

## 2021-07-14 RX ORDER — EPINEPHRINE 1 MG/ML
0.3 INJECTION, SOLUTION, CONCENTRATE INTRAVENOUS AS NEEDED
Status: CANCELLED | OUTPATIENT
Start: 2021-07-28

## 2021-07-14 RX ORDER — HEPARIN 100 UNIT/ML
300-500 SYRINGE INTRAVENOUS AS NEEDED
Status: CANCELLED
Start: 2021-07-28

## 2021-07-14 RX ORDER — DIPHENHYDRAMINE HYDROCHLORIDE 50 MG/ML
50 INJECTION, SOLUTION INTRAMUSCULAR; INTRAVENOUS ONCE
Status: COMPLETED | OUTPATIENT
Start: 2021-07-14 | End: 2021-07-14

## 2021-07-14 RX ADMIN — ACETAMINOPHEN 650 MG: 325 TABLET ORAL at 09:15

## 2021-07-14 RX ADMIN — DIPHENHYDRAMINE HYDROCHLORIDE 50 MG: 50 INJECTION, SOLUTION INTRAMUSCULAR; INTRAVENOUS at 09:10

## 2021-07-14 RX ADMIN — SODIUM CHLORIDE 25 ML/HR: 9 INJECTION, SOLUTION INTRAVENOUS at 09:10

## 2021-07-14 RX ADMIN — Medication 10 ML: at 09:10

## 2021-07-14 NOTE — PATIENT INSTRUCTIONS
Rituximab (By injection)   Rituximab ([id number]-mab)  Treats rheumatoid arthritis (RA), Wegener granulomatosis, and microscopic polyangiitis (MPA). Also treats cancer, including lymphoma and leukemia. Brand Name(s): Ritunancy Rumelquiades   There may be other brand names for this medicine. When This Medicine Should Not Be Used: You should not receive this medicine if you have had an allergic reaction to rituximab or other murine (mice or rat) proteins. How to Use This Medicine:   Injectable  · Medicines used to treat cancer are very strong and can have many side effects. Before receiving this medicine, make sure you understand all the risks and benefits. It is important for you to work closely with your doctor during your treatment. · You will receive this medicine while you are in a hospital or cancer treatment center. A nurse or other trained health professional will give you this medicine. · Your doctor will prescribe your dose and schedule. This medicine is given through a needle placed in a vein. · Rituximab must be given slowly, so the needle will remain in place for a few hours. You may also receive medicines (such as acetaminophen, antihistamines) to help prevent possible allergic reactions to the injection. · You will be watched closely for unwanted side effects while you are receiving this medicine. · This medicine should come with a Medication Guide. Ask your pharmacist for a copy if you do not have one. If a dose is missed:   · Call your doctor or pharmacist for instructions. Drugs and Foods to Avoid:   Ask your doctor or pharmacist before using any other medicine, including over-the-counter medicines, vitamins, and herbal products. · Make sure your doctor knows if you are also receiving cisplatin (Leroy Souza Ultramar 112). Tell your doctor if you have taken other medicines to treat rheumatoid arthritis such as adalimumab (Humira®), etanercept (Enbrel®), or infliximab (Remicade®).   · This medicine may interfere with vaccines. Ask your doctor before you get a flu shot or any other vaccines. Some vaccines may be given 4 weeks before a rituximab injection. Warnings While Using This Medicine:   · Make sure your doctor knows if you are pregnant or planning to become pregnant. You must use an effective form of birth control to prevent pregnancy. You should not become pregnant while you are receiving this medicine and for 12 months after stopping it. · Make sure your doctor knows if you are breastfeeding, or if you have kidney disease, liver disease (including hepatitis B), chest pain (angina), heart disease, heart rhythm problems, or lung problems. Also tell your doctor if you have a weak immune system or any type of infection. · Tell your doctor if you have had a reaction to murine (mice or rat) proteins. Murine proteins are also used in other medicines. · Rituximab may cause a serious side effect called an infusion reaction. This can be life-threatening and requires immediate medical attention. Check with your doctor or nurse right away if you have a rash, itching, swelling of the face, tongue, and throat, trouble breathing, or chest pain. · This medicine may cause a serious reaction called tumor lysis syndrome. Call your doctor right away if you have changes in how often you urinate, rapid weight gain, swelling of the feet or lower legs, uneven heartbeat, or seizures. · If you have a severe skin reaction, tell your doctor right away. Symptoms may include blistering, peeling, or loosening of the skin, red skin lesions, severe acne or skin rash, sores or ulcers on the skin, or fever or chills. · This medicine may increase your risk of infections during treatment and up to a year after treatment. These infections can be severe and lead to death. Avoid being near people who are sick. Wash your hands often. Tell your doctor if you have lupus or have ever had an infection that kept coming back.   · Call your doctor right away if you have a cough that won't go away, weight loss, night sweats, fever, chills, flu-like symptoms (such as a runny or stuffy nose, headache, blurred vision, or feeling generally ill), painful or difficult urination, or sores, ulcers, or white spots in the mouth or on the lips. These may be signs of infection. · This medicine may cause a rare and serious brain infection called progressive multifocal leukoencephalopathy (PML). The risk for getting this infection is higher if you have rheumatoid arthritis. Check with your doctor right away if you have more than one of these symptoms: vision changes, loss of coordination, clumsiness, memory loss, difficulty speaking or understanding what others say, and weakness in the legs. · Check with your doctor right away if you have any symptoms of liver problems, including yellow skin and eyes, dark urine or pale stools, nausea and vomiting, or upper stomach pain. · Check with your doctor right away if you have abdominal pain. This medicine could cause serious stomach and bowel problems, such as a bowel obstruction or a hole in your bowel. · Your doctor will do lab tests at regular visits to check on the effects of this medicine. Keep all appointments.   Possible Side Effects While Using This Medicine:   Call your doctor right away if you notice any of these side effects:  · Allergic reaction: Itching or hives, swelling in your face or hands, swelling or tingling in your mouth or throat, chest tightness, trouble breathing  · Blistering, peeling, red skin rash  · Bloody vomit or vomit that looks like coffee grounds, severe stomach pain  · Changes in vision  · Chest pain, uneven heartbeat, sudden fainting  · Confusion, body weakness, shortness of breath, numbness or tingling in your hands, feet, or lips  · Dark-colored urine or pale stools, nausea, vomiting, loss of appetite, pain in your upper stomach, yellow eyes or skin  · Decrease in how much or how often you urinate, pain while urinating  · Dizziness, lightheadedness, drowsiness  · Fever, chills, cough, sore throat, and body aches  · Joint pain, stiffness, or swelling  · Problems with coordination or speech  · Rapid weight gain, swelling in your hands, ankles, or feet  · Sores, ulcers, or white spots in the mouth or on the lips  · Unusual bleeding or bruising  · Unusual tiredness or weakness  If you notice these less serious side effects, talk with your doctor:   · Headache  · Mild skin rash or itching  · Pain, itching, burning, swelling, or a lump under your skin where the needle is placed  If you notice other side effects that you think are caused by this medicine, tell your doctor. Call your doctor for medical advice about side effects. You may report side effects to FDA at 6-633-FDA-4712  © 2017 [facility] Information is for End User's use only and may not be sold, redistributed or otherwise used for commercial purposes. The above information is an  only. It is not intended as medical advice for individual conditions or treatments. Talk to your doctor, nurse or pharmacist before following any medical regimen to see if it is safe and effective for you.

## 2021-07-15 LAB
BASOPHILS # BLD: 0 K/UL (ref 0–0.1)
BASOPHILS NFR BLD: 1 % (ref 0–1)
DIFFERENTIAL METHOD BLD: ABNORMAL
EOSINOPHIL # BLD: 0.2 K/UL (ref 0–0.4)
EOSINOPHIL NFR BLD: 3 % (ref 0–7)
ERYTHROCYTE [DISTWIDTH] IN BLOOD BY AUTOMATED COUNT: 13.1 % (ref 11.5–14.5)
HCT VFR BLD AUTO: 36.3 % (ref 36.6–50.3)
HGB BLD-MCNC: 11.2 G/DL (ref 12.1–17)
IMM GRANULOCYTES # BLD AUTO: 0 K/UL (ref 0–0.04)
IMM GRANULOCYTES NFR BLD AUTO: 0 % (ref 0–0.5)
LYMPHOCYTES # BLD: 2.8 K/UL (ref 0.8–3.5)
LYMPHOCYTES NFR BLD: 39 % (ref 12–49)
MCH RBC QN AUTO: 30.1 PG (ref 26–34)
MCHC RBC AUTO-ENTMCNC: 30.9 G/DL (ref 30–36.5)
MCV RBC AUTO: 97.6 FL (ref 80–99)
MONOCYTES # BLD: 0.7 K/UL (ref 0–1)
MONOCYTES NFR BLD: 9 % (ref 5–13)
NEUTS SEG # BLD: 3.6 K/UL (ref 1.8–8)
NEUTS SEG NFR BLD: 48 % (ref 32–75)
NRBC # BLD: 0 K/UL (ref 0–0.01)
NRBC BLD-RTO: 0 PER 100 WBC
PLATELET # BLD AUTO: 254 K/UL (ref 150–400)
PMV BLD AUTO: 11.1 FL (ref 8.9–12.9)
RBC # BLD AUTO: 3.72 M/UL (ref 4.1–5.7)
WBC # BLD AUTO: 7.3 K/UL (ref 4.1–11.1)

## 2021-07-19 NOTE — PROGRESS NOTES
[facility] Rheumatology  OBIC Note    Date: 2021    Rheumatology OBIC COVID-19 SCREENING  The patient was asked the following questions and answered as documented below:    1. Do you have any symptoms of COVID-19? SOB, coughing, fever, or generally not feeling well? NO  2. Have you been exposed to COVID-19 recently? NO  3. Have you had any recent contact with family/friend that has a pending COVID test? NO    Name: Antoinette Mccord  MRN: [id number]       : 1950  Diagnosis: IgG4 related disease   Treatment: Ruxience 1000mg (Day 15)   Provider: Dr. Dario Chapman    Patient arrived to [facility] at [address] [place] [address]. Mr. Mo Memory allergies reviewed and he agreed to receiving today's treatment. A physical assessment was performed initially and post-treatment. Pt. denies new complaints today. Vitals:    21 1038 21 1115 21 1200 21 1245   BP: 131/81 113/63 134/74 (!) 142/80   Pulse: (!) 51 60 62 60   Resp: 15 15 14 15   Temp:       SpO2: 98% 97% 97% 98%     Recent lab results   2021 09:00   WBC 7.3   NRBC 0.0   RBC 3.72 (L)   HGB 11.2 (L)   HCT 36.3 (L)   MCV 97.6   MCH 30.1   MCHC 30.9   RDW 13.1   PLATELET 940   MPV 26.7   NEUTROPHILS 48   LYMPHOCYTES 39   MONOCYTES 9   EOSINOPHILS 3   BASOPHILS 1   IMMATURE GRANULOCYTES 0   DF AUTOMATED   ABSOLUTE NRBC 0.00   ABS. NEUTROPHILS 3.6   ABS. IMM. GRANS. 0.0   ABS. LYMPHOCYTES 2.8   ABS. MONOCYTES 0.7   ABS. EOSINOPHILS 0.2   ABS.  BASOPHILS 0.0     Medications given:  Administrations This Visit     0.9% sodium chloride infusion     Admin Date  2021 Action  New Bag Dose  25 mL/hr Rate  25 mL/hr Route  IntraVENous Administered By  Reinaldo Anderson RN          acetaminophen (TYLENOL) tablet 650 mg     Admin Date  2021 Action  Given Dose  650 mg Route  Oral Administered By  Reinaldo Anderson RN          diphenhydrAMINE (BENADRYL) injection 50 mg     Admin Date  2021 Action  Given Dose  50 mg Route  IntraVENous Administered By  Reinaldo Anderson RN methylPREDNISolone (PF) (Solu-MEDROL) injection 125 mg     Admin Date  07/27/2021 Action  Given Dose  125 mg Route  IntraVENous Administered By  Miracle Ruiz RN          riTUXimab-pvvr (RUXIENCE) 1,000 mg in 0.9% sodium chloride 250 mL infusion     Admin Date  07/27/2021 Action  New Bag Dose  1,000 mg Rate  25 mL/hr Route  IntraVENous Administered By  Miracle Ruiz RN          sodium chloride (NS) flush 10 mL     Admin Date  07/27/2021 Action  Given Dose  10 mL Route  IntraVENous Administered By  Miracle Ruiz RN           Admin Date  07/27/2021 Action  Given Dose  10 mL Route  IntraVENous Administered By  Miracle Ruiz RN            Acetaminophen 325mg tablets x 2 (650mg total)  NDC 89117-876-51  Lot # [id number]  Exp 07/2022     Benadryl 50mg IVP  NDC 87508-741-34  Lot # [id number]  Exp 03/2022     Solumedrol 125mg IVP  NDC 1270-0836-52  Lot # [id number]  Exp 07/2022     250ml bag 0.9% Normal Saline @ 25ml/hr for [address] 9733-7583-63  Lot #  [id number]  Exp 08/2022     0.9% Normal Saline flush x 3  NDC 5061-3906-27  Lot # [id number]  Exp 01/2022     Ruxience 500mg/50ml x2 (1000mg total dose)  NDC 5045-2757-55  Lot # [id number]  Exp 09/30/2022  ---mixed in---  250ml 0.9% Normal Saline bag   NDC  2379-4631-51  Lot # [id number]  Exp N/A    START TIME: 0930  50ml/hr @ 6559  75ml/hr @ 7973  100ml/hr @1115  STOP TIME: 7016     Lines:   24G 0.75\" R upper FA PIV. Blood return noted and IV site assessed before, during, and after treatment. Line flushed with 10-30 ml's 0.9% Normal Saline solution    Access was removed from Mr. Cole Moses after completion of infusion. He tolerated the treatment without complaints. No medication reactions seen while in presence of this RN. Patient provided with AVS, which includes future appointments and written educational material regarding Ruxience. All of patients questions were answered and was discharged ambulatory from [facility] in stable condition at 1:00p with spouse and son. Future Appointments   Date Time Provider Marina Warner   9/21/2021  9:00 AM Danielle Saleem MD LIVR BS AMB   12/21/2021  8:40 AM Michael Moreno MD AOCR BS AMB   1/11/2022  9:00 AM INFUSIONINJ_RC AOCR BS AMB   1/25/2022  9:00 AM INFUSIONINJ_RC AOCR BS AMB     Pt and family aware need to schedule 6 month post-infusion CT. Phone number provided per Dr. Helio Ferguson office notes.     Florian Stinson RN  July 27, 2021  2:00pm

## 2021-07-27 ENCOUNTER — OFFICE VISIT (OUTPATIENT)
Dept: RHEUMATOLOGY | Age: 71
End: 2021-07-27
Payer: MEDICARE

## 2021-07-27 VITALS
HEART RATE: 60 BPM | OXYGEN SATURATION: 98 % | RESPIRATION RATE: 15 BRPM | DIASTOLIC BLOOD PRESSURE: 80 MMHG | TEMPERATURE: 97.9 F | SYSTOLIC BLOOD PRESSURE: 142 MMHG

## 2021-07-27 DIAGNOSIS — D89.89 IGG4 RELATED DISEASE (HCC): ICD-10-CM

## 2021-07-27 DIAGNOSIS — N13.5 RETROPERITONEAL FIBROSIS: ICD-10-CM

## 2021-07-27 DIAGNOSIS — K83.09 IGG4-RELATED SCLEROSING CHOLANGITIS: Primary | ICD-10-CM

## 2021-07-27 PROCEDURE — 96374 THER/PROPH/DIAG INJ IV PUSH: CPT

## 2021-07-27 PROCEDURE — 96375 TX/PRO/DX INJ NEW DRUG ADDON: CPT

## 2021-07-27 PROCEDURE — 96415 CHEMO IV INFUSION ADDL HR: CPT

## 2021-07-27 PROCEDURE — 96413 CHEMO IV INFUSION 1 HR: CPT

## 2021-07-27 RX ORDER — DIPHENHYDRAMINE HYDROCHLORIDE 50 MG/ML
50 INJECTION, SOLUTION INTRAMUSCULAR; INTRAVENOUS ONCE
Status: CANCELLED | OUTPATIENT
Start: 2021-07-28 | End: 2021-07-28

## 2021-07-27 RX ORDER — ALBUTEROL SULFATE 0.83 MG/ML
2.5 SOLUTION RESPIRATORY (INHALATION) AS NEEDED
Status: CANCELLED
Start: 2021-07-28

## 2021-07-27 RX ORDER — HEPARIN 100 UNIT/ML
300-500 SYRINGE INTRAVENOUS AS NEEDED
Status: CANCELLED
Start: 2021-07-28

## 2021-07-27 RX ORDER — DIPHENHYDRAMINE HYDROCHLORIDE 50 MG/ML
50 INJECTION, SOLUTION INTRAMUSCULAR; INTRAVENOUS ONCE
Status: COMPLETED | OUTPATIENT
Start: 2021-07-27 | End: 2021-07-27

## 2021-07-27 RX ORDER — ACETAMINOPHEN 325 MG/1
650 TABLET ORAL ONCE
Status: COMPLETED | OUTPATIENT
Start: 2021-07-27 | End: 2021-07-27

## 2021-07-27 RX ORDER — EPINEPHRINE 1 MG/ML
0.3 INJECTION, SOLUTION, CONCENTRATE INTRAVENOUS AS NEEDED
Status: CANCELLED | OUTPATIENT
Start: 2021-07-28

## 2021-07-27 RX ORDER — HYDROCORTISONE SODIUM SUCCINATE 100 MG/2ML
100 INJECTION, POWDER, FOR SOLUTION INTRAMUSCULAR; INTRAVENOUS AS NEEDED
Status: CANCELLED | OUTPATIENT
Start: 2021-07-28

## 2021-07-27 RX ORDER — SODIUM CHLORIDE 9 MG/ML
25 INJECTION, SOLUTION INTRAVENOUS CONTINUOUS
Status: CANCELLED | OUTPATIENT
Start: 2021-07-28

## 2021-07-27 RX ORDER — SODIUM CHLORIDE 9 MG/ML
10 INJECTION INTRAMUSCULAR; INTRAVENOUS; SUBCUTANEOUS AS NEEDED
Status: CANCELLED | OUTPATIENT
Start: 2021-07-28

## 2021-07-27 RX ORDER — ACETAMINOPHEN 325 MG/1
650 TABLET ORAL ONCE
Status: CANCELLED | OUTPATIENT
Start: 2021-07-28 | End: 2021-07-28

## 2021-07-27 RX ORDER — SODIUM CHLORIDE 0.9 % (FLUSH) 0.9 %
10 SYRINGE (ML) INJECTION AS NEEDED
Status: DISCONTINUED | OUTPATIENT
Start: 2021-07-27 | End: 2021-07-27 | Stop reason: HOSPADM

## 2021-07-27 RX ORDER — DIPHENHYDRAMINE HYDROCHLORIDE 50 MG/ML
25 INJECTION, SOLUTION INTRAMUSCULAR; INTRAVENOUS AS NEEDED
Status: CANCELLED
Start: 2021-07-28

## 2021-07-27 RX ORDER — ONDANSETRON 2 MG/ML
8 INJECTION INTRAMUSCULAR; INTRAVENOUS AS NEEDED
Status: CANCELLED | OUTPATIENT
Start: 2021-07-28

## 2021-07-27 RX ORDER — SODIUM CHLORIDE 9 MG/ML
25 INJECTION, SOLUTION INTRAVENOUS CONTINUOUS
Status: DISCONTINUED | OUTPATIENT
Start: 2021-07-27 | End: 2021-07-27 | Stop reason: HOSPADM

## 2021-07-27 RX ORDER — DIPHENHYDRAMINE HYDROCHLORIDE 50 MG/ML
50 INJECTION, SOLUTION INTRAMUSCULAR; INTRAVENOUS AS NEEDED
Status: CANCELLED
Start: 2021-07-28

## 2021-07-27 RX ORDER — ACETAMINOPHEN 325 MG/1
650 TABLET ORAL AS NEEDED
Status: CANCELLED
Start: 2021-07-28

## 2021-07-27 RX ORDER — SODIUM CHLORIDE 0.9 % (FLUSH) 0.9 %
10 SYRINGE (ML) INJECTION AS NEEDED
Status: CANCELLED | OUTPATIENT
Start: 2021-07-28

## 2021-07-27 RX ADMIN — ACETAMINOPHEN 650 MG: 325 TABLET ORAL at 09:00

## 2021-07-27 RX ADMIN — DIPHENHYDRAMINE HYDROCHLORIDE 50 MG: 50 INJECTION, SOLUTION INTRAMUSCULAR; INTRAVENOUS at 09:02

## 2021-07-27 RX ADMIN — Medication 10 ML: at 09:02

## 2021-07-27 RX ADMIN — Medication 10 ML: at 09:10

## 2021-07-27 RX ADMIN — SODIUM CHLORIDE 25 ML/HR: 9 INJECTION, SOLUTION INTRAVENOUS at 09:05

## 2021-07-27 NOTE — PATIENT INSTRUCTIONS
Rituximab (By injection)   Rituximab (ed-[facility]-b-mab)  Treats rheumatoid arthritis (RA), Wegener granulomatosis, and microscopic polyangiitis (MPA). Also treats cancer, including lymphoma and leukemia. Brand Name(s): Ritunancy Rumelquiades   There may be other brand names for this medicine. When This Medicine Should Not Be Used: You should not receive this medicine if you have had an allergic reaction to rituximab or other murine (mice or rat) proteins. How to Use This Medicine:   Injectable  · Medicines used to treat cancer are very strong and can have many side effects. Before receiving this medicine, make sure you understand all the risks and benefits. It is important for you to work closely with your doctor during your treatment. · You will receive this medicine while you are in a hospital or cancer treatment center. A nurse or other trained health professional will give you this medicine. · Your doctor will prescribe your dose and schedule. This medicine is given through a needle placed in a vein. · Rituximab must be given slowly, so the needle will remain in place for a few hours. You may also receive medicines (such as acetaminophen, antihistamines) to help prevent possible allergic reactions to the injection. · You will be watched closely for unwanted side effects while you are receiving this medicine. · This medicine should come with a Medication Guide. Ask your pharmacist for a copy if you do not have one. If a dose is missed:   · Call your doctor or pharmacist for instructions. Drugs and Foods to Avoid:   Ask your doctor or pharmacist before using any other medicine, including over-the-counter medicines, vitamins, and herbal products. · Make sure your doctor knows if you are also receiving cisplatin (Leroy Souza Ultramar 112). Tell your doctor if you have taken other medicines to treat rheumatoid arthritis such as adalimumab (Humira®), etanercept (Enbrel®), or infliximab (Remicade®).   · This medicine may interfere with vaccines. Ask your doctor before you get a flu shot or any other vaccines. Some vaccines may be given 4 weeks before a rituximab injection. Warnings While Using This Medicine:   · Make sure your doctor knows if you are pregnant or planning to become pregnant. You must use an effective form of birth control to prevent pregnancy. You should not become pregnant while you are receiving this medicine and for 12 months after stopping it. · Make sure your doctor knows if you are breastfeeding, or if you have kidney disease, liver disease (including hepatitis B), chest pain (angina), heart disease, heart rhythm problems, or lung problems. Also tell your doctor if you have a weak immune system or any type of infection. · Tell your doctor if you have had a reaction to murine (mice or rat) proteins. Murine proteins are also used in other medicines. · Rituximab may cause a serious side effect called an infusion reaction. This can be life-threatening and requires immediate medical attention. Check with your doctor or nurse right away if you have a rash, itching, swelling of the face, tongue, and throat, trouble breathing, or chest pain. · This medicine may cause a serious reaction called tumor lysis syndrome. Call your doctor right away if you have changes in how often you urinate, rapid weight gain, swelling of the feet or lower legs, uneven heartbeat, or seizures. · If you have a severe skin reaction, tell your doctor right away. Symptoms may include blistering, peeling, or loosening of the skin, red skin lesions, severe acne or skin rash, sores or ulcers on the skin, or fever or chills. · This medicine may increase your risk of infections during treatment and up to a year after treatment. These infections can be severe and lead to death. Avoid being near people who are sick. Wash your hands often. Tell your doctor if you have lupus or have ever had an infection that kept coming back.   · Call your doctor right away if you have a cough that won't go away, weight loss, night sweats, fever, chills, flu-like symptoms (such as a runny or stuffy nose, headache, blurred vision, or feeling generally ill), painful or difficult urination, or sores, ulcers, or white spots in the mouth or on the lips. These may be signs of infection. · This medicine may cause a rare and serious brain infection called progressive multifocal leukoencephalopathy (PML). The risk for getting this infection is higher if you have rheumatoid arthritis. Check with your doctor right away if you have more than one of these symptoms: vision changes, loss of coordination, clumsiness, memory loss, difficulty speaking or understanding what others say, and weakness in the legs. · Check with your doctor right away if you have any symptoms of liver problems, including yellow skin and eyes, dark urine or pale stools, nausea and vomiting, or upper stomach pain. · Check with your doctor right away if you have abdominal pain. This medicine could cause serious stomach and bowel problems, such as a bowel obstruction or a hole in your bowel. · Your doctor will do lab tests at regular visits to check on the effects of this medicine. Keep all appointments.   Possible Side Effects While Using This Medicine:   Call your doctor right away if you notice any of these side effects:  · Allergic reaction: Itching or hives, swelling in your face or hands, swelling or tingling in your mouth or throat, chest tightness, trouble breathing  · Blistering, peeling, red skin rash  · Bloody vomit or vomit that looks like coffee grounds, severe stomach pain  · Changes in vision  · Chest pain, uneven heartbeat, sudden fainting  · Confusion, body weakness, shortness of breath, numbness or tingling in your hands, feet, or lips  · Dark-colored urine or pale stools, nausea, vomiting, loss of appetite, pain in your upper stomach, yellow eyes or skin  · Decrease in how much or how often you urinate, pain while urinating  · Dizziness, lightheadedness, drowsiness  · Fever, chills, cough, sore throat, and body aches  · Joint pain, stiffness, or swelling  · Problems with coordination or speech  · Rapid weight gain, swelling in your hands, ankles, or feet  · Sores, ulcers, or white spots in the mouth or on the lips  · Unusual bleeding or bruising  · Unusual tiredness or weakness  If you notice these less serious side effects, talk with your doctor:   · Headache  · Mild skin rash or itching  · Pain, itching, burning, swelling, or a lump under your skin where the needle is placed  If you notice other side effects that you think are caused by this medicine, tell your doctor. Call your doctor for medical advice about side effects. You may report side effects to FDA at 2-586-FDA-5553  © 2017 [facility] Information is for End User's use only and may not be sold, redistributed or otherwise used for commercial purposes. The above information is an  only. It is not intended as medical advice for individual conditions or treatments. Talk to your doctor, nurse or pharmacist before following any medical regimen to see if it is safe and effective for you.

## 2021-09-21 ENCOUNTER — OFFICE VISIT (OUTPATIENT)
Dept: HEMATOLOGY | Age: 71
End: 2021-09-21
Payer: MEDICARE

## 2021-09-21 VITALS
DIASTOLIC BLOOD PRESSURE: 71 MMHG | RESPIRATION RATE: 16 BRPM | SYSTOLIC BLOOD PRESSURE: 131 MMHG | HEART RATE: 50 BPM | HEIGHT: 61 IN | BODY MASS INDEX: 23.37 KG/M2 | WEIGHT: 123.8 LBS | TEMPERATURE: 97.1 F

## 2021-09-21 DIAGNOSIS — K83.09 IGG4-RELATED SCLEROSING CHOLANGITIS: Primary | ICD-10-CM

## 2021-09-21 LAB
ALBUMIN SERPL-MCNC: 4.4 G/DL (ref 3.5–5)
ALBUMIN/GLOB SERPL: 1.4 {RATIO} (ref 1.1–2.2)
ALP SERPL-CCNC: 107 U/L (ref 45–117)
ALT SERPL-CCNC: 25 U/L (ref 12–78)
AMYLASE SERPL-CCNC: 59 U/L (ref 25–115)
ANION GAP SERPL CALC-SCNC: 5 MMOL/L (ref 5–15)
AST SERPL-CCNC: 24 U/L (ref 15–37)
BASOPHILS # BLD: 0 K/UL (ref 0–0.1)
BASOPHILS NFR BLD: 1 % (ref 0–1)
BILIRUB DIRECT SERPL-MCNC: 0.1 MG/DL (ref 0–0.2)
BILIRUB SERPL-MCNC: 0.4 MG/DL (ref 0.2–1)
BUN SERPL-MCNC: 24 MG/DL (ref 6–20)
BUN/CREAT SERPL: 21 (ref 12–20)
CALCIUM SERPL-MCNC: 9.1 MG/DL (ref 8.5–10.1)
CHLORIDE SERPL-SCNC: 112 MMOL/L (ref 97–108)
CO2 SERPL-SCNC: 24 MMOL/L (ref 21–32)
CREAT SERPL-MCNC: 1.14 MG/DL (ref 0.7–1.3)
DIFFERENTIAL METHOD BLD: ABNORMAL
EOSINOPHIL # BLD: 0.2 K/UL (ref 0–0.4)
EOSINOPHIL NFR BLD: 4 % (ref 0–7)
ERYTHROCYTE [DISTWIDTH] IN BLOOD BY AUTOMATED COUNT: 12.9 % (ref 11.5–14.5)
GLOBULIN SER CALC-MCNC: 3.2 G/DL (ref 2–4)
GLUCOSE SERPL-MCNC: 93 MG/DL (ref 65–100)
HCT VFR BLD AUTO: 40.9 % (ref 36.6–50.3)
HGB BLD-MCNC: 12.6 G/DL (ref 12.1–17)
IMM GRANULOCYTES # BLD AUTO: 0 K/UL (ref 0–0.04)
IMM GRANULOCYTES NFR BLD AUTO: 0 % (ref 0–0.5)
LIPASE SERPL-CCNC: 185 U/L (ref 73–393)
LYMPHOCYTES # BLD: 2.5 K/UL (ref 0.8–3.5)
LYMPHOCYTES NFR BLD: 49 % (ref 12–49)
MCH RBC QN AUTO: 29.2 PG (ref 26–34)
MCHC RBC AUTO-ENTMCNC: 30.8 G/DL (ref 30–36.5)
MCV RBC AUTO: 94.9 FL (ref 80–99)
MONOCYTES # BLD: 0.6 K/UL (ref 0–1)
MONOCYTES NFR BLD: 12 % (ref 5–13)
NEUTS SEG # BLD: 1.7 K/UL (ref 1.8–8)
NEUTS SEG NFR BLD: 34 % (ref 32–75)
NRBC # BLD: 0 K/UL (ref 0–0.01)
NRBC BLD-RTO: 0 PER 100 WBC
PLATELET # BLD AUTO: 247 K/UL (ref 150–400)
PMV BLD AUTO: 10.2 FL (ref 8.9–12.9)
POTASSIUM SERPL-SCNC: 4.4 MMOL/L (ref 3.5–5.1)
PROT SERPL-MCNC: 7.6 G/DL (ref 6.4–8.2)
RBC # BLD AUTO: 4.31 M/UL (ref 4.1–5.7)
SODIUM SERPL-SCNC: 141 MMOL/L (ref 136–145)
WBC # BLD AUTO: 5.1 K/UL (ref 4.1–11.1)

## 2021-09-21 PROCEDURE — 99215 OFFICE O/P EST HI 40 MIN: CPT | Performed by: INTERNAL MEDICINE

## 2021-09-21 NOTE — PROGRESS NOTES
Boby Moise MD, Connor Moreno MD Delanna Offer, MATTY Spear, [facility]     Trena Cornell, [facility]   Marisa Sandoval [facility]    Perlita Mehta, [facility]       Leroy Paige [address]    at [address], [address] [place] [address], [facility] 22.    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  ICD-9-CM: 576.1  7/1/2021        IgG4 related disease ([facility] [address] ICD-10-CM: I74.86  ICD-9-CM: 279.8  7/1/2021        Retroperitoneal fibrosis ICD-10-CM: N13.5  ICD-9-CM: 593.4  7/1/2021        Autoimmune hepatitis ([facility] [address] ICD-10-CM: K75.4  ICD-9-CM: 571.42  9/4/2014    Overview Signed 9/4/2014 10:29 AM by Chester Martinez MD     Autoimmune etiology             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 The active problem list, all pertinent past medical history, medications, endoscopic studies, radiologic findings and laboratory findings related to the liver disorder were reviewed with the patient. The patient does speak [place]. The patient chose to use language translation by a family member who was with the patient during this appointment.     The patient is a 80 y.o.  male who developed acute pancreatitis and was noted to have elevated liver enzymes in 2013. Imaging demonstrated enlargement of the pancreas and EUS guided biopsy was consistent with autoimmune pancreatitis. A Liver biopsy in 2013 was consistent with autimmune hepatitis and stage 3 fibrosis. He has been maintained on tacrolimus 1 mg every day since 2016. The liver transaminases have remained normal and there has been no recurrence of pancreatitis. 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 This is associated with IGG4 autoimmune pancreatitis and autoimmune cholangitis. He s now being seen by Dr Joe Norton who started Retuximab for autoimmune retroperitoneal fibrosis and has stopped tacrolimus. The patient does not have any symptoms which could be attributed to the liver disorder. The patient is not experiencing the following symptoms which are commonly seen in this liver disorder:   fatigue,   pain in the right side over the liver,   diffuse abdominal pain   arthralgias,   myalgias,     The patient completes all daily activities without any functional limitations. ASSESSMENT AND PLAN:  Autoimmune Hepatitis   The diagnosis was based upon serology, liver biopsy, other coexistant autoimmune disorders    A liver biopsy performed in 2013 shows moderate inflammation and Stage 3 bridging fibrosis. Fibroscan has been performed annually since 2016. The most recent Fibroscan in 2021 demonstrated a liver stiffness of 5.4 consistent with stage 0-1 fibrosis    The patient was initially treated with steroids for both autoimmune hepatitis and pancreatitis. He was tried on 6MP but relapsed when steroids were tapered. He was intolerant to cellcept. He was well controlled on prednisone and Azathioprine but would relapse whenever the dose of steroids was lowered to under 20 mg every day.     He was started on tacrolimus in 2016 and with this was able to taper and stop both prednsione and azathioprine without relapse of ether autoimmune hepatitis or pancreatitis. He was maintained on low dose TAC 1 mg every day since 2016. He was recently found to have retroperitoneal fibrosis which is associated with IGG4 autoimmne cholangitis and immune pancreatitis. He has seen and now co-managed by MARIANELA Barba. Dr Rufino Woods has started Retuximab for retroperitoneal fibrosis and stopped tacrolimus. Liver transaminases remain normal.  ALP is normal.  Liver function is normal.  The platelet count is   normal.      Serologic testing was negative for IGG4 and positive for APRIL, ASMA. The Fibroscan will continue to be repeated annually or as often as clinically indicated to assess for fibrosis progression and/or regression. Autoimmune pancreatitis  The patient developed acute pancreatitis without obvious etiology in 2013. The pancreas was diffusely enlarged consistent with autoimmune pancreatitis. An EUS with biopsy of the pancreas demonstrated inflammation and was consistent with autoimmune pancreatitis. The patient was initially treated with steroids for both autoimmune hepatitis and pancreatitis in 2013. He recieved several different treatments for the 2 immune diseases as noted above. He was started on tacrolimus in 2016 and with this was able to taper and stop both prednsione and azathioprine without relapse of ether autoimmune hepatitis or pancreatitis. 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 This was managed by Dr Dirk Hassan. The CBD stricture was treated with stenting but did not resolve until a metal stent was placed in 4/2016. This was removed in 9/2018. Retroperitoneal fibrosis  The patient was found to have right hydronephrosis in 2/2021. This was treated with stenting of the ureter. There were enlarged nodes in the retroperitoneum.   A biopsy of the enlarged nodes was negative for malignancy. A bone marrow biopsy was negative. Further imaging demonstrated uretal stcricture was due to retroperitoneal fibrosis  This is being treated by Dr Wesley Fallon with Retuximab. Treatment of other medical problems in patients with chronic liver disease  There are no contraindications for the patient to take most medications that are necessary for treatment of other medical issues. Normal doses of acetaminophen, as recommended on the label of the bottle, are not hepatotoxic except in the setting of daily alcohol use, even in patients with cirrhosis and can be utilized for pain. Counseling for alcohol in patients with chronic liver disease  The patient was counseled regarding alcohol consumption and the effect of alcohol on chronic liver disease. The patient does not consume any significant amount of alcohol. Vaccinations   Vaccination for viral hepatitis B is not needed. The patient has serologic evidence of prior exposure or vaccination with immunity. The patient has received 2 doses of COVID-19 vaccine. Routine vaccinations against other bacterial and viral agents can be performed as indicated. Annual flu vaccination should be administered if indicated. ALLERGIES:  No Known Allergies    MEDICATIONS  Current Outpatient Medications   Medication Sig    carvediloL (COREG) 3.125 mg tablet Take  by mouth.  tamsulosin (FLOMAX) 0.4 mg capsule Take  by mouth.  Lactobacillus acidophilus (PROBIOTIC PO) Take  by mouth.  calcium-cholecalciferol, d3, (CALCIUM 600 + D) 600-125 mg-unit tab Take  by mouth.  Omega-3 Fatty Acids (FISH OIL) 300 mg cap Take  by mouth.  GLUCOSAM/CHOND/HYALU/CF BORATE (Three Ring PO) Take  by mouth.  tacrolimus (PROGRAF) 1 mg capsule Take 1 tab every day. Indications: Autoimmune hepatitis refractory to prednsione (Patient not taking: Reported on 9/21/2021)     No current facility-administered medications for this visit.      SYSTEM REVIEW NOT RELATED TO LIVER DISEASE OR REVIEWED ABOVE:  Constitution systems: Negative for fever, chills, weight gain, weight loss. Eyes: Negative for visual changes. ENT: Negative for sore throat, painful swallowing. Respiratory: Negative for cough, hemoptysis, SOB. Cardiology: Negative for chest pain, palpitations. GI:  Negative for constipation or diarrhea. Occasional heartburn symptoms. : Negative for urinary frequency, dysuria, hematuria, nocturia. Skin: Negative for rash. Hematology: Negative for easy bruising, blood clots. Musculo-skeletal: Negative for back pain, muscle pain, weakness. Neurologic: Negative for headaches, dizziness, vertigo, memory problems not related to HE. Psychology: Negative for anxiety, depression. FAMILY HISTORY:  The father  of colon cancer. The mother has the following chronic diseases: has an autoimmune disease. There is no family history of liver disease. There is a family history of autoimmune diseases. SOCIAL HISTORY:  The patient is . The patient has 2 children. The patient has never used tobacco products. The patient has never consumed significant amounts of alcohol. The patient currently works full time in the [employer] at [employer]. PHYSICAL EXAMINATION:  Visit Vitals  /71 (BP 1 Location: Left upper arm, BP Patient Position: Sitting, BP Cuff Size: Adult)   Pulse (!) 50   Temp 97.1 °F (36.2 °C) (Temporal)   Resp 16   Ht 5' 1\" (1.549 m)   Wt 123 lb 12.8 oz (56.2 kg)   BMI 23.39 kg/m²     General: No acute distress. Eyes: Sclera anicteric. ENT: No oral lesions. Nodes: No adenopathy. Skin: No spider angiomata. No jaundice. No palmar erythema. Respiratory: Lungs clear to auscultation. Cardiovascular: Regular heart rate. No murmurs. No JVD. Abdomen: Soft non-tender. Liver size normal to percussion/palpation. Spleen not palpable. No obvious ascites. Extremities: No edema. No muscle wasting.   No gross arthritic changes. Neurologic: Alert and oriented. Cranial nerves grossly intact. No asterixis. LABORATORY STUDIES:  Liver [place] of [address] Units 9/21/2021   WBC 4.1 - 11.1 K/uL 5.1   ANC 1.8 - 8.0 K/UL 1.7 (L)   HGB 12.1 - 17.0 g/dL 12.6    - 400 K/uL 247   AST 15 - 37 U/L 24   ALT 12 - 78 U/L 25   Alk Phos 45 - 117 U/L 107   Bili, Total 0.2 - 1.0 MG/DL 0.4   Bili, Direct 0.0 - 0.2 MG/DL 0.1   Albumin 3.5 - 5.0 g/dL 4.4   BUN 6 - 20 MG/DL 24 (H)   Creat 0.70 - 1.30 MG/DL 1.14   Na 136 - 145 mmol/L 141   K 3.5 - 5.1 mmol/L 4.4   Cl 97 - 108 mmol/L 112 (H)   CO2 21 - 32 mmol/L 24   Glucose 65 - 100 mg/dL 93     SEROLOGIES:  10/2013. HBsAntigen negative, anti-HBcore positive, anti-HBsurface positive, HBE antigen negative, Anti-HBE positive,HBV DNA undetectable, Ferritin 797. Serologies Latest Ref Rng 6/3/2014   APRIL, IFA  See patterns   APRIL Pattern  1:160 (H)   C-ANCA Neg:<1:20 titer <1:20   P-ANCA Neg:<1:20 titer <1:20   ANCA Neg:<1:20 titer <1:20   ASMCA 0 - 19 Units 25 (H)   M2 Ab 0.0 - 20.0 Units 3.8     Cancer Screening Latest Ref Rng & Units 10/11/2017 6/3/2014   CA 19-9 0 - 35 U/mL 8 69 (H)   CEA ng/mL 0.5    CEA 0.0 - 4.7 ng/mL  1.2     7/2013.  17. Serologies Latest Ref Rng & Units 4/13/2021   Hep B Surface Ag Index <0.10   Hep B Surface Ag Interp Negative   Negative   Hep B Core Ab, Total Negative   Negative   Hep B Surface Ab mIU/mL 118.90   Hep B Surface Ab Interp NR   REACTIVE (A)   Hep C Ab NONREACTIVE   NONREACTIVE       LIVER HISTOLOGY:  2013. Reviewed by MLS. Moderate inflammation with stage 3 fibrosis. 8/2016. FibroScan performed at [facility]. EkPa was 7.4. Suggested fibrosis level is F1.    11/2017. FibroScan performed at [facility]. EkPa was 8.1. Suggested fibrosis level is F1.    1/2019. FibroScan performed at [facility]. EkPa was 6.3. Suggested fibrosis level is F1.  CAP score is 330, suggestive of significant steatosis. 12/2019. FibroScan performed at [address]. EkPa was 6.6. Suggested fibrosis level is F0-1. CAP score is 286, this is consistent with steatosis. 7/2021. FibroScan performed at [address]. EkPa was 5.4. Suggested fibrosis level is F0-1. CAP score 181, steatosis. ENDOSCOPIC PROCEDURES:  5/2013. EGD by Dr Alice Astorga. Gastritis and small gastric ulcer. 7/2013. EGD by Dr Alice Astorga. Healed gastritis and .  12/2015. ERCP. Common bile duct stricture. Stent placed. 4/2016. ERCP by Dr Abdelrahman Tesfaye. Pancreatic stricture. Covered metal stent placed. 11/2017. ERCP By Dr. Abdelrahman Tesfaye. Bile duct stricture and stented. Bile duct stone-extracted. 5/2018. ERCP by Dr. Abdelrahman Tesfaye. Bile duct stricture-stent changed. Abnormal mucosa ampulla-biopsied  9/2018. ERCP by Dr. Abdelrahman Tesfaye. Bile duct stricture-resolved. Biliary sludge-extracted. RADIOLOGY:  10/2013. MRI of liver. Diffuse swelling of pancreas with peripancreatic edema. Encasement of CDB by edematous pancreas head. Normal appearing liver. No liver mass lesions. Normal spleen. No dilated bile ducts. No bile duct strictures. No ascites. 8/2014. MRI of liver. Short yet severe stricture of the common duct within the pancreatic head without associated pancreatic ductal or intrahepatic biliary dilatation. There is questionable subtle decreased contrast enhancement within the pancreatic head. ERCP with endoscopic ultrasound suggested for further evaluation. 11/2015. MRCP. Obstruction of CBD in region of pennie hepatitis. Distal CBD is normal. Proximal to obstruction the intrahepatic bile ducts are dilated. 6/2019. MRI performed in [facility]. Per patient no findings, possible pancreatic atrophy.  2/2021. CT abdomen. No liver lesion noted. Right hydronephrosis seen. 6/2021. CT abdomen.  Infiltrative retroperitoneal mass, encasing the right common iliac artery and  right ureter, demonstrates no significant change in size since the April 2021 examination. There is moderately severe right-sided hydronephrosis and  hydroureter; the right ureteral stent is in satisfactory position. 5 mm right  lower lobe pleural-based pulmonary nodule demonstrates no significant change  since 2021. OTHER TESTIN2021. Bone marrow biopsy negative for malignancy  2021. Retroperitoneal Lymph node biopsy negative for malignancy    FOLLOW-UP:  All of the issues listed above in the Assessment and Plan were discussed with the patient. All questions were answered. The patient expressed a clear understanding of the above. [address] [place] [address] in 6 months for routine monitoring.       Naty Mcmahon MD  [address], [address] [place] [address] 22.  [phone]  [address] [place] [address]

## 2021-09-21 NOTE — Clinical Note
10/10/2021    Patient: Grady Chiang   YOB: 1950   Date of Visit: 9/21/2021     Rosalia Hernandez MD  [place] [address]  [facility] [address]  Via Fax: [phone]    Dear Rosalia Hernandez MD,      Thank you for referring Mr. Nixon Trevizo to [address] [facility] for evaluation. My notes for this consultation are attached. If you have questions, please do not hesitate to call me. I look forward to following your patient along with you.       Sincerely,    Constanza Mcdonald MD

## 2021-09-21 NOTE — PROGRESS NOTES
Identified pt with two pt identifiers(name and ). Reviewed record in preparation for visit and have obtained necessary documentation. Chief Complaint   Patient presents with    Follow-up        Health Maintenance Due   Topic    DTaP/Tdap/Td series (1 - Tdap)    Lipid Screen     Colorectal Cancer Screening Combo     Shingrix Vaccine Age 50> (1 of 2)    Pneumococcal 65+ years (1 of 1 - PPSV23)    COVID-19 Vaccine (3 - Moderna risk 3-dose series)    Flu Vaccine (1)        Visit Vitals  /71 (BP 1 Location: Left upper arm, BP Patient Position: Sitting, BP Cuff Size: Adult)   Pulse (!) 50   Temp 97.1 °F (36.2 °C) (Temporal)   Resp 16   Ht 5' 1\" (1.549 m)   Wt 123 lb 12.8 oz (56.2 kg)   BMI 23.39 kg/m²     Pain Scale: /10    Coordination of Care Questionnaire:  :   1. Have you been to the ER, urgent care clinic since your last visit? Hospitalized since your last visit? No    2. Have you seen or consulted any other health care providers outside of the [address] [place] [address] since your last visit? Include any pap smears or colon screening.  No

## 2021-09-23 LAB — IGG4 SER-MCNC: 78 MG/DL (ref 2–96)

## 2021-12-14 ENCOUNTER — HOSPITAL ENCOUNTER (OUTPATIENT)
Dept: CT IMAGING | Age: 71
Discharge: HOME | End: 2021-12-14
Attending: INTERNAL MEDICINE
Payer: COMMERCIAL

## 2021-12-14 DIAGNOSIS — K83.09 IGG4-RELATED SCLEROSING CHOLANGITIS: ICD-10-CM

## 2021-12-14 DIAGNOSIS — N13.5 RETROPERITONEAL FIBROSIS: ICD-10-CM

## 2021-12-14 DIAGNOSIS — D89.89 IGG4 RELATED DISEASE (HCC): ICD-10-CM

## 2021-12-14 DIAGNOSIS — N13.30 HYDRONEPHROSIS, UNSPECIFIED HYDRONEPHROSIS TYPE: ICD-10-CM

## 2021-12-14 PROCEDURE — 74011000636 HC RX REV CODE- 636: Performed by: RADIOLOGY

## 2021-12-14 PROCEDURE — 74177 CT ABD & PELVIS W/CONTRAST: CPT

## 2021-12-14 RX ADMIN — IOPAMIDOL 100 ML: 755 INJECTION, SOLUTION INTRAVENOUS at 09:36

## 2021-12-21 ENCOUNTER — VIRTUAL VISIT (OUTPATIENT)
Dept: RHEUMATOLOGY | Age: 71
End: 2021-12-21
Payer: COMMERCIAL

## 2021-12-21 DIAGNOSIS — K83.09 IGG4-RELATED SCLEROSING CHOLANGITIS: Primary | ICD-10-CM

## 2021-12-21 DIAGNOSIS — D89.89 IGG4 RELATED DISEASE (HCC): ICD-10-CM

## 2021-12-21 DIAGNOSIS — N13.5 RETROPERITONEAL FIBROSIS: ICD-10-CM

## 2021-12-21 DIAGNOSIS — N13.30 HYDRONEPHROSIS, UNSPECIFIED HYDRONEPHROSIS TYPE: ICD-10-CM

## 2021-12-21 DIAGNOSIS — Z79.60 LONG-TERM USE OF IMMUNOSUPPRESSANT MEDICATION: ICD-10-CM

## 2021-12-21 PROCEDURE — 99215 OFFICE O/P EST HI 40 MIN: CPT | Performed by: INTERNAL MEDICINE

## 2021-12-21 NOTE — PROGRESS NOTES
REASON FOR VISIT    This is a follow-up visit for Mr. Radha Miller for     ICD-10-CM   1. 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 [facility]  D89.89   3. 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 Services were provided through a video synchronous discussion virtually to substitute for in-person clinic visit. Immunosuppression Screening:  Quantiferon TB: negative  PPD:  Not performed  Hepatitis B: negative (4/13/2021)  Hepatitis C: negative (4/13/2021)    Therapy History includes:  Current DMARD therapy include: rituximab 1000 mg IV every 6 months (7/14/2021 to present)  Prior DMARD therapy include: None  Discontinued DMARDs because of inefficacy: None  Discontinued DMARDs because of side effects: None    HISTORY OF PRESENT ILLNESS    Mr. Radha Miller returns for a follow-up. On his last visit, I felt he had IgG4-related sclerosing cholangitis with retroperitoneal fibrosis so started rituximab infusions. His most recent dose was 7/27/2021, which he received with good tolerance. I ordered CT abdomen and pelvis done on 12/14/2021 decreased, focal retroperitoneal fibrosis around the bilateral common iliac arteries. Decreased right hydronephrosis, now mild to moderate. Right ureteral stent in place. Urothelial enhancement and renal sinus fat inflammation suggest right pyelitis. No overt pyelonephritis. Today, he feels well.  He denies abdominal pain but was recently started on omeprazole. He had a ureter stent placed 3 months ago and will follow up with urology on 1/05/2022. Most recent toxicity monitoring by blood work was done on 9/21/2021 and did not reveal any significant adverse effects. I reviewed the patient's interval medical history, surgical history, medications, and allergies. The patient has not had any interval hospital admissions, infections, or surgeries. REVIEW OF SYSTEMS    A comprehensive review of systems was performed and pertinent results are documented in the HPI, review of systems is otherwise non-contributory. PAST MEDICAL HISTORY    He has a past medical history of Liver disease. FAMILY HISTORY    His family history includes Cancer in his brother, father, mother, and sister. SOCIAL HISTORY    He reports that he has never smoked. He has never used smokeless tobacco. He reports that he does not drink alcohol and does not use drugs. MEDICATIONS    Current Outpatient Medications   Medication Sig    carvediloL (COREG) 3.125 mg tablet Take  by mouth.  tamsulosin (FLOMAX) 0.4 mg capsule Take  by mouth.  Lactobacillus acidophilus (PROBIOTIC PO) Take  by mouth.  calcium-cholecalciferol, d3, (CALCIUM 600 + D) 600-125 mg-unit tab Take  by mouth.  Omega-3 Fatty Acids (FISH OIL) 300 mg cap Take  by mouth.  GLUCOSAM/CHOND/HYALU/CF BORATE (MOVE FREE MyColorScreen PO) Take  by mouth. No current facility-administered medications for this visit. ALLERGIES    No Known Allergies    PHYSICAL EXAMINATION    There were no vitals taken for this visit. There is no height or weight on file to calculate BMI. General: Patient is alert, oriented x 3, not in acute distress, son at bedside    HEENT:   Sclerae are not injected and appear moist.  There is no alopecia. Chest:  Breathing comfortably at room air.     DATA REVIEW    Laboratory     Recent laboratory results were reviewed, summarized, and discussed with the patient. Imaging    Musculoskeletal Ultrasound    None    Radiographs    None    CT Imaging    CT Abdomen and Pelvis with contrast 12/14/2021: Abdomen: A sub-6 mm, subpleural nodule in the right lower lobe requires no follow-up. The lung bases are otherwise clear. The heart is at the upper limits of normal in size. The coronary sinus is dilated (11 mm). The distal esophagus, stomach, duodenum, liver, gallbladder, pancreas, spleen, adrenals, and left kidney are normal. Retroperitoneum: Right hydronephrosis is mild to moderate, despite an appropriately positioned ureteral stent. Hydronephrosis is decreased from 6/9/2021, however. Enhancement of the urothelium and inflammation of the renal sinus fat suggests pyelitis. No right renal perfusion defects to suggest pyelonephritis. There is mild right renal atrophy relative to the left kidney. The bilateral common iliac arteries are encased by abnormal soft tissue in the inferior retroperitoneum. This extends to the aortic bifurcation, and probably around the bilateral internal iliac arteries. This is the cause of right ureteral obstruction. On prior studies, the thickness of this rind of soft tissue was greater, more consistent with fibrosis than vasculitis. Today, it measures 6 mm in greatest thickness at the level of the right common iliac artery (3-43), versus 8 mm on 6/9/2021 and 10 mm on 3/31/2021 at similar levels. Pelvis: There is a large volume of stool throughout the colon. The small bowel, ileocecal junction, and bladder are normal. The appendix is not visualized; no pericecal inflammatory process. No free air or fluid, and no abdominopelvic lymphadenopathy. CT Abdomen and Pelvis with contrast 6/09/2021: LOWER THORAX: 5 mm pleural-based nodule in the right lower lobe, unchanged since the April 27, 2021 PET CT. LIVER: No mass. BILIARY TREE: Gallbladder is within normal limits. CBD is not dilated. SPLEEN: within normal limits.  PANCREAS: No mass or ductal dilatation. ADRENALS: Unremarkable. KIDNEYS: Moderately severe right-sided hydronephrosis and hydroureter. Right-sided ureteral stent is in satisfactory position. STOMACH: Unremarkable. SMALL BOWEL: No dilatation or wall thickening. COLON: No dilatation or wall thickening. APPENDIX: Normal. PERITONEUM: No ascites or pneumoperitoneum. RETROPERITONEUM: Infiltrative retroperitoneal mass encases the right common iliac artery and right ureter; its largest component measures 2.8 x 2.6 cm, previously 2.7 x 2.7 cm. Subcentimeter retroperitoneal lymph nodes are not significantly changed. REPRODUCTIVE ORGANS: Unremarkable URINARY BLADDER: No mass or calculus. BONES: No destructive bone lesion. ABDOMINAL WALL: No mass or hernia. PET/CT Scan 4/27/2021: HEAD/NECK: There is a 1 cm supraclavicular lymph node with SUV of 3.2. CHEST: There are normal sized mediastinal and hilar lymph nodes with slight increased metabolic activity of 2.8. ABDOMEN/PELVIS: Right ureteral stent is noted in position. There is increased soft tissue density at the aortic bifurcation with slight increased metabolic activity of 4. SKELETON: No foci of abnormal hypermetabolism in the axial and visualized appendicular skeleton. CT Abdomen and Pelvis with contrast 10/15/2015: Visualized lower Chest: Lungs/Pleura: Within normal limits Heart/vessels: Normal. Abdomen and pelvis: Liver: Normal. Biliary: Post cholecystectomy. There is new intrahepatic severe biliary dilation with ducts converging at the level of the hilum where the lumen becomes completely collapse. No clearly evident mass is identified however there is suggestion of some enhancement of the duct because of the common hepatic duct just as it exits the area of collapse. Distally the common duct distends to approximately 9 mm to 10 taper within the pancreatic head at the site of previous stricture identified on MRI within the pancreatic head.  Spleen: Normal. Pancreas: Normal.  No duct dilation. Adrenals: Normal. Urinary: Normal appearance to kidneys, ureters, and bladder. Peritoneum/Mesenteries: Normal. Gastrointestinal tract: Normal.  No dilation or inflammatory changes. Vascular: Atherosclerotic calcination is without aneurysm. Reproductive System: Seminal vesicles and prostate appear unremarkable. MSK: Spondylosis with no aggressive lesions of bone. MR Imaging    MRI Abdomen with and without contrast 11/12/2015: Liver:  No steatosis. Interval development of diffuse intrahepatic biliary ductal dilatation related to central obstruction. Subtle mild T2 hyperintensity is seen centrally near the level of obstruction. In addition, there is ill-defined masslike enhancement centrally best seen on series 9 image 39 measuring approximately 3.3 x 4.0 cm. There is nodular soft tissue thickening and enhancement extending along the common hepatic duct into the common bile duct. Gallbladder:  Mildly thickwalled, but underdistended gallbladder. No filling defect. Spleen, pancreas, and adrenal glands:   Normal in signal. No focal abnormality. Kidneys:  Symmetric enhancement. No hydronephrosis. MRCP:  Severe, diffuse intrahepatic biliary ductal dilatation related to complete central common hepatic/common bile duct obstruction near the pennie hepatis. The common bile duct is patent the distal to the level of  obstruction all the way to the ampulla with no filling defect or stricture. Normal course and caliber of the pancreatic duct. Other findings:  No ascites. No lymphadenopathy. MRI/MRCP Abdomen with and without contrast 8/09/20214: The common bile is at upper limits of normal size at 6 mm. There is a short segment stricture in the region of the pancreatic head. There is no associated biliary ductal dilatation. Although there is no definite pancreatic head mass, there may be very subtle lower signal intensity within the pancreatic head on postcontrast imaging. This is a questionable finding.  There is no intrahepatic biliary dilatation. No focal liver lesion. Liver spleen and kidneys are normal. No adenopathy. DXA     None    PATHOLOGY    Lymph node, right supraclavicular, core biopsy with touch preparation 5/21/2021: Lymphoid tissue with no definitive morphologic, immunohistochemical, or flow cytometric evidence of a lymphoproliferative disorder. No cells diagnostic for malignancy. Bone marrow, posterior iliac crest, aspirate, clot section, and decalcified core biopsy: Normocellular marrow with maturing trilineage hematopoiesis. No morphologic or immunophenotypic evidence of a lymphoproliferative disorder. Flow cytometry shows no diagnostic immunophenotypic abnormalities. Ampulla of Vater, biopsy 5/11/2018: Small bowel and ampullary mucosa with chronic active inflammation. No malignancy identified     Common bile duct biopsy 1/03/2018: Scattered detached ductal epithelial cells with reactive type changes with background fibrin and blood. Ampullary biopsy 1/03/2018: Benign ductal and small intestinal epithelium and stroma with reactive changes. Bile duct, biopsy 10/11/2017: Predominantly unremarkable small intestinal mucosa with preserved villous architecture. Scant fragment of benign bile duct mucosa with chronic inflammation     Bile duct, biopsy 4/27/2016: Benign bile duct epithelium with acute and chronic inflammation and reactive changes. Abundant granulation tissue. Negative for dysplasia or malignancy     ASSESSMENT AND PLAN    This is a follow-up visit for Mr. Jeana Frankel. 1) Likely IgG4-related sclerosing cholangitis with retroperitoneal mass. This was complicated with moderately severe right-sided hydronephrosis and hydroureter. He had a history of abdominal pain. Imaging showed severe, diffuse intrahepatic biliary ductal dilatation related to complete central common hepatic/common bile duct obstruction near the pennie hepatis.  Biopsy was inconclusive (acute and chronic inflammation and reactive changes) but no IgG4 staining performed. This was treated with biliary stent placement. He was also treated with tacrolimus 1 mg daily.     Years later, he then developed right flank pain and was found to have ureter obstruction due to mass and was referred to urology who placed a stent. I started rituximab 1000 mg every 6 months (RA protocol) which he received on 7/14/2021 and 7/27/2021 with good tolerance. He has not had abdominal pain. Repeat CT abdomen on 12/14/2021 showed decreased, focal retroperitoneal fibrosis around the bilateral common iliac arteries. I will continue rituximab. I ordered a repeat CT to be done on 3/23/2022 due to appointment with hepatology since he lives in [facility] to determine treatment response. If he continues to have improvement, then I recommend continuing rituximab. He has a follow up with urology on 1/05/2022. The patient has consented for synchronous (real-time) Telemedicine (audio-video technology) on 12/21/2021 for their care to be delivered over telemedicine in place of their regularly scheduled office visit pursuant to the emergency declaration under the [facility] waiver authority and the [employer] Act, this Virtual  Visit was conducted, with patient's consent, to reduce the patient's risk of exposure to COVID-19 and provide continuity of care for an established patient. A total of 42 minutes was spent on this visit, reviewing interval notes, interval testing results, ordering tests, refilling medications, documenting the findings in the note, patient education, counseling, and coordination of care as described above. All questions asked and answered. Services were provided through a video synchronous discussion virtually to substitute for in-person clinic visit.     TODAY'S ORDERS    Orders Placed This Encounter    CT ABD PELV W CONT    QUANTIFERON-TB PLUS(CLIENT INCUB.)     Future Appointments   Date Time Provider Marina Warner   1/11/2022  9:00 AM INFUSIONINJ_RC AOCR BS AMB   1/25/2022  9:00 AM INFUSIONINJ_RC AOCR BS AMB   3/23/2022  9:00 AM DONALD Maldonado BS AMB   7/12/2022  9:00 AM Sanna Wilson MD AOCR BS AMB   7/12/2022  9:00 AM INFUSIONINJ_RC AOCR BS AMB     Denisse Asif MD, [facility]    Adult Rheumatology   Rheumatology Ultrasound Certified  [facility]  A Part of [facility], [address] [place] [address] Road   Phone [phone]  Fax [phone]

## 2022-01-10 DIAGNOSIS — K83.09 IGG4-RELATED SCLEROSING CHOLANGITIS: Primary | ICD-10-CM

## 2022-01-11 ENCOUNTER — OFFICE VISIT (OUTPATIENT)
Dept: RHEUMATOLOGY | Age: 72
End: 2022-01-11
Payer: COMMERCIAL

## 2022-01-11 VITALS
OXYGEN SATURATION: 98 % | SYSTOLIC BLOOD PRESSURE: 146 MMHG | RESPIRATION RATE: 14 BRPM | DIASTOLIC BLOOD PRESSURE: 84 MMHG | HEART RATE: 52 BPM | TEMPERATURE: 97 F

## 2022-01-11 DIAGNOSIS — N13.5 RETROPERITONEAL FIBROSIS: ICD-10-CM

## 2022-01-11 DIAGNOSIS — Z79.60 LONG-TERM USE OF IMMUNOSUPPRESSANT MEDICATION: ICD-10-CM

## 2022-01-11 DIAGNOSIS — D89.89 IGG4 RELATED DISEASE (HCC): ICD-10-CM

## 2022-01-11 DIAGNOSIS — K83.09 IGG4-RELATED SCLEROSING CHOLANGITIS: Primary | ICD-10-CM

## 2022-01-11 LAB
BASOPHILS # BLD: 0 K/UL (ref 0–0.1)
BASOPHILS NFR BLD: 1 % (ref 0–1)
DIFFERENTIAL METHOD BLD: NORMAL
EOSINOPHIL # BLD: 0.2 K/UL (ref 0–0.4)
EOSINOPHIL NFR BLD: 3 % (ref 0–7)
ERYTHROCYTE [DISTWIDTH] IN BLOOD BY AUTOMATED COUNT: 13.1 % (ref 11.5–14.5)
HCT VFR BLD AUTO: 39.3 % (ref 36.6–50.3)
HGB BLD-MCNC: 12.6 G/DL (ref 12.1–17)
IMM GRANULOCYTES # BLD AUTO: 0 K/UL (ref 0–0.04)
IMM GRANULOCYTES NFR BLD AUTO: 0 % (ref 0–0.5)
LYMPHOCYTES # BLD: 2 K/UL (ref 0.8–3.5)
LYMPHOCYTES NFR BLD: 34 % (ref 12–49)
MCH RBC QN AUTO: 30.1 PG (ref 26–34)
MCHC RBC AUTO-ENTMCNC: 32.1 G/DL (ref 30–36.5)
MCV RBC AUTO: 94 FL (ref 80–99)
MONOCYTES # BLD: 0.7 K/UL (ref 0–1)
MONOCYTES NFR BLD: 12 % (ref 5–13)
NEUTS SEG # BLD: 2.9 K/UL (ref 1.8–8)
NEUTS SEG NFR BLD: 50 % (ref 32–75)
NRBC # BLD: 0 K/UL (ref 0–0.01)
NRBC BLD-RTO: 0 PER 100 WBC
PLATELET # BLD AUTO: 260 K/UL (ref 150–400)
PMV BLD AUTO: 9.9 FL (ref 8.9–12.9)
RBC # BLD AUTO: 4.18 M/UL (ref 4.1–5.7)
WBC # BLD AUTO: 5.9 K/UL (ref 4.1–11.1)

## 2022-01-11 PROCEDURE — 96415 CHEMO IV INFUSION ADDL HR: CPT | Performed by: INTERNAL MEDICINE

## 2022-01-11 PROCEDURE — 96374 THER/PROPH/DIAG INJ IV PUSH: CPT | Performed by: INTERNAL MEDICINE

## 2022-01-11 PROCEDURE — 96413 CHEMO IV INFUSION 1 HR: CPT | Performed by: INTERNAL MEDICINE

## 2022-01-11 PROCEDURE — 96375 TX/PRO/DX INJ NEW DRUG ADDON: CPT | Performed by: INTERNAL MEDICINE

## 2022-01-11 RX ORDER — SODIUM CHLORIDE 0.9 % (FLUSH) 0.9 %
10 SYRINGE (ML) INJECTION AS NEEDED
Status: DISCONTINUED | OUTPATIENT
Start: 2022-01-11 | End: 2022-01-11 | Stop reason: HOSPADM

## 2022-01-11 RX ORDER — SODIUM CHLORIDE 0.9 % (FLUSH) 0.9 %
10 SYRINGE (ML) INJECTION AS NEEDED
Status: CANCELLED | OUTPATIENT
Start: 2022-01-23

## 2022-01-11 RX ORDER — DIPHENHYDRAMINE HYDROCHLORIDE 50 MG/ML
25 INJECTION, SOLUTION INTRAMUSCULAR; INTRAVENOUS AS NEEDED
Status: CANCELLED
Start: 2022-01-23

## 2022-01-11 RX ORDER — ONDANSETRON 2 MG/ML
8 INJECTION INTRAMUSCULAR; INTRAVENOUS AS NEEDED
Status: CANCELLED | OUTPATIENT
Start: 2022-01-23

## 2022-01-11 RX ORDER — EPINEPHRINE 1 MG/ML
0.3 INJECTION, SOLUTION, CONCENTRATE INTRAVENOUS AS NEEDED
Status: CANCELLED | OUTPATIENT
Start: 2022-01-23

## 2022-01-11 RX ORDER — DIPHENHYDRAMINE HYDROCHLORIDE 50 MG/ML
50 INJECTION, SOLUTION INTRAMUSCULAR; INTRAVENOUS ONCE
Status: CANCELLED | OUTPATIENT
Start: 2022-01-23 | End: 2022-01-23

## 2022-01-11 RX ORDER — HYDROCORTISONE SODIUM SUCCINATE 100 MG/2ML
100 INJECTION, POWDER, FOR SOLUTION INTRAMUSCULAR; INTRAVENOUS AS NEEDED
Status: CANCELLED | OUTPATIENT
Start: 2022-01-23

## 2022-01-11 RX ORDER — HEPARIN 100 UNIT/ML
300-500 SYRINGE INTRAVENOUS AS NEEDED
Status: CANCELLED
Start: 2022-01-23

## 2022-01-11 RX ORDER — SODIUM CHLORIDE 9 MG/ML
25 INJECTION, SOLUTION INTRAVENOUS CONTINUOUS
Status: DISCONTINUED | OUTPATIENT
Start: 2022-01-11 | End: 2022-01-11 | Stop reason: HOSPADM

## 2022-01-11 RX ORDER — SODIUM CHLORIDE 9 MG/ML
25 INJECTION, SOLUTION INTRAVENOUS CONTINUOUS
Status: CANCELLED | OUTPATIENT
Start: 2022-01-23

## 2022-01-11 RX ORDER — SODIUM CHLORIDE 9 MG/ML
10 INJECTION INTRAMUSCULAR; INTRAVENOUS; SUBCUTANEOUS AS NEEDED
Status: CANCELLED | OUTPATIENT
Start: 2022-01-23

## 2022-01-11 RX ORDER — ACETAMINOPHEN 325 MG/1
650 TABLET ORAL ONCE
Status: CANCELLED | OUTPATIENT
Start: 2022-01-23 | End: 2022-01-23

## 2022-01-11 RX ORDER — ACETAMINOPHEN 325 MG/1
650 TABLET ORAL ONCE
Status: COMPLETED | OUTPATIENT
Start: 2022-01-11 | End: 2022-01-11

## 2022-01-11 RX ORDER — DIPHENHYDRAMINE HYDROCHLORIDE 50 MG/ML
50 INJECTION, SOLUTION INTRAMUSCULAR; INTRAVENOUS AS NEEDED
Status: CANCELLED
Start: 2022-01-23

## 2022-01-11 RX ORDER — ALBUTEROL SULFATE 0.83 MG/ML
2.5 SOLUTION RESPIRATORY (INHALATION) AS NEEDED
Status: CANCELLED
Start: 2022-01-23

## 2022-01-11 RX ORDER — DIPHENHYDRAMINE HYDROCHLORIDE 50 MG/ML
50 INJECTION, SOLUTION INTRAMUSCULAR; INTRAVENOUS ONCE
Status: COMPLETED | OUTPATIENT
Start: 2022-01-11 | End: 2022-01-11

## 2022-01-11 RX ORDER — ACETAMINOPHEN 325 MG/1
650 TABLET ORAL AS NEEDED
Status: CANCELLED
Start: 2022-01-23

## 2022-01-11 RX ADMIN — DIPHENHYDRAMINE HYDROCHLORIDE 50 MG: 50 INJECTION, SOLUTION INTRAMUSCULAR; INTRAVENOUS at 08:57

## 2022-01-11 RX ADMIN — Medication 10 ML: at 09:03

## 2022-01-11 RX ADMIN — ACETAMINOPHEN 650 MG: 325 TABLET ORAL at 09:00

## 2022-01-11 RX ADMIN — SODIUM CHLORIDE 25 ML/HR: 9 INJECTION, SOLUTION INTRAVENOUS at 09:05

## 2022-01-11 NOTE — PROGRESS NOTES
[facility] Rheumatology  OBIC Note    Date: 2022    Rheumatology OBIC COVID-19 SCREENING  The patient was asked the following questions and answered as documented below:    1. Do you have any symptoms of COVID-19? SOB, coughing, fever, or generally not feeling well? NO  2. Have you been exposed to COVID-19 recently? NO  3. Have you had any recent contact with family/friend that has a pending COVID test? NO    Name: Ysabel Walls  MRN: [id number]       : 1950  Diagnosis: IgG4 related disease  Treatment: Ruxience 1000mg Day 1  Referring Provider: Dr. Violette Boone provider: Dr. Og Randolph    Patient arrived to [facility] [address] [facility]. Mr. Will Delacruz allergies reviewed and he agreed to receiving today's treatment. A physical assessment was performed initially and post-treatment. Pt. denies new complaints today. Pt scheduled for stent replacement tomorrow in [place]. Mr. Samantha Petersen vitals were monitored before, during and after medication administration. Vitals:    22 1000 22 1033 22 1107 22 1140   BP: (!) 141/85 136/86 135/80 (!) 147/83   Pulse: (!) 50 (!) 53 (!) 52 (!) 55   Resp: 14 14 16 16   Temp:       SpO2: 96% 97% 98% 98%     CBC & quant TB drawn and walked to lab.     Medications given per providers orders:  Administrations This Visit     0.9% sodium chloride infusion     Admin Date  2022 Action  New Bag Dose  25 mL/hr Rate  25 mL/hr Route  IntraVENous Administered By  Sofi Green RN          acetaminophen (TYLENOL) tablet 650 mg     Admin Date  2022 Action  Given Dose  650 mg Route  Oral Administered By  Sofi Green RN          diphenhydrAMINE (BENADRYL) injection 50 mg     Admin Date  2022 Action  Given Dose  50 mg Route  IntraVENous Administered By  Sofi Green RN          methylPREDNISolone (PF) (Solu-MEDROL) injection 125 mg     Admin Date  2022 Action  Given Dose  125 mg Route  IntraVENous Administered By  Sofi Green RN riTUXimab-pvvr (Dias Raymond) 1,000 mg in 0.9% sodium chloride 250 mL infusion     Admin Date  01/11/2022 Action  New Bag Dose  1,000 mg Rate  25 mL/hr Route  IntraVENous Administered By  Stacia Patino RN          sodium chloride (NS) flush 10 mL     Admin Date  01/11/2022 Action  Given Dose  10 mL Route  IntraVENous Administered By  Stacia Patino RN            Tylenol 325mg tablets x2 (total dose 650mg)  [facility] 41036-418-03  Lot #   Exp 07/2022     Benadryl 50mg IVP  NDC 46704-767-50  Lot # [id number]  Exp 07/2023     Solumedrol 125mg   NDC 5013-0881-47  Lot # [id number]  Exp 06/2023     250ml bag 0.9% Normal Saline @ 25ml/hr for Virginia Rosales 5186-0662-46  Lot #  [id number]  Exp 10/2023     0.9% Normal Saline flush x 2  NDC 5348-5466-47  Lot # [id number]  Exp 04/2024     Ruxience 1000mg  QY 7227-1542-31  Lot # [id number]  Exp 09/2022  ---mixed in---  250ml bag 0.9% Normal Saline  [facility] 0953-4781-67  Lot # [id number]  Exp  11/2022     START: 25ml/hr @ 8628  50ml/hr @ 4302  75ml/hr @ 1010  100ml/hr @ 1870  XJJV: 4086    Lines: 24G left upper FA. Blood return noted and IV site assessed before, during, and after treatment. Line flushed with 10-30 ml's 0.9% Normal Saline solution per protocol. Access was removed from Mr. Jozef Turpin after completion of infusion/injection. Mr. Jozef Turpin tolerated the treatment without complaints and no medication reactions were seen while in presence of this RN. Patient provided with AVS, which included future appointments and written educational material regarding Ruxience. All of the patients questions were answered and then discharged ambulatory from Rheumatology OBIC in stable condition at 1225.      Future Appointments   Date Time Provider Marina Warner   1/25/2022  9:00 AM INFUSIONINJ_RC AOCR BS AMB   3/23/2022  9:00 AM Ronda MontezR BS AMB   7/12/2022  9:00 AM Milagro Hart MD AOCR BS AMB   7/12/2022  9:00 AM DION_MARVIN AOCR BS SIGIFREDO Ferro RN  January 11, 2022  2:30pm

## 2022-01-14 LAB
M TB IFN-G BLD-IMP: NEGATIVE
QUANTIFERON CRITERIA, QFI1T: NORMAL
QUANTIFERON MITOGEN VALUE: >10 IU/ML
QUANTIFERON NIL VALUE: 0.04 IU/ML
QUANTIFERON TB1 AG: 0.05 IU/ML
QUANTIFERON TB2 AG: 0.04 IU/ML

## 2022-01-17 DIAGNOSIS — N13.5 RETROPERITONEAL FIBROSIS: ICD-10-CM

## 2022-01-17 DIAGNOSIS — D89.89 IGG4 RELATED DISEASE (HCC): ICD-10-CM

## 2022-01-17 DIAGNOSIS — K83.09 IGG4-RELATED SCLEROSING CHOLANGITIS: Primary | ICD-10-CM

## 2022-01-25 ENCOUNTER — OFFICE VISIT (OUTPATIENT)
Dept: RHEUMATOLOGY | Age: 72
End: 2022-01-25
Payer: COMMERCIAL

## 2022-01-25 VITALS
SYSTOLIC BLOOD PRESSURE: 126 MMHG | DIASTOLIC BLOOD PRESSURE: 79 MMHG | RESPIRATION RATE: 16 BRPM | HEART RATE: 62 BPM | TEMPERATURE: 97 F | OXYGEN SATURATION: 98 %

## 2022-01-25 DIAGNOSIS — N13.5 RETROPERITONEAL FIBROSIS: ICD-10-CM

## 2022-01-25 DIAGNOSIS — D89.89 IGG4 RELATED DISEASE (HCC): ICD-10-CM

## 2022-01-25 DIAGNOSIS — K83.09 IGG4-RELATED SCLEROSING CHOLANGITIS: Primary | ICD-10-CM

## 2022-01-25 PROCEDURE — 96415 CHEMO IV INFUSION ADDL HR: CPT

## 2022-01-25 PROCEDURE — 96375 TX/PRO/DX INJ NEW DRUG ADDON: CPT

## 2022-01-25 PROCEDURE — 96413 CHEMO IV INFUSION 1 HR: CPT

## 2022-01-25 PROCEDURE — 96372 THER/PROPH/DIAG INJ SC/IM: CPT

## 2022-01-25 PROCEDURE — 96374 THER/PROPH/DIAG INJ IV PUSH: CPT

## 2022-01-25 RX ORDER — SODIUM CHLORIDE 0.9 % (FLUSH) 0.9 %
10 SYRINGE (ML) INJECTION AS NEEDED
Status: CANCELLED | OUTPATIENT
Start: 2022-06-28

## 2022-01-25 RX ORDER — ALBUTEROL SULFATE 0.83 MG/ML
2.5 SOLUTION RESPIRATORY (INHALATION) AS NEEDED
Status: CANCELLED
Start: 2022-06-28

## 2022-01-25 RX ORDER — ACETAMINOPHEN 325 MG/1
650 TABLET ORAL ONCE
Status: COMPLETED | OUTPATIENT
Start: 2022-01-25 | End: 2022-01-25

## 2022-01-25 RX ORDER — ACETAMINOPHEN 325 MG/1
650 TABLET ORAL AS NEEDED
Status: CANCELLED
Start: 2022-06-28

## 2022-01-25 RX ORDER — HYDROCORTISONE SODIUM SUCCINATE 100 MG/2ML
100 INJECTION, POWDER, FOR SOLUTION INTRAMUSCULAR; INTRAVENOUS AS NEEDED
Status: CANCELLED | OUTPATIENT
Start: 2022-06-28

## 2022-01-25 RX ORDER — ACETAMINOPHEN 325 MG/1
650 TABLET ORAL ONCE
Status: CANCELLED | OUTPATIENT
Start: 2022-06-28 | End: 2022-06-28

## 2022-01-25 RX ORDER — SODIUM CHLORIDE 9 MG/ML
25 INJECTION, SOLUTION INTRAVENOUS CONTINUOUS
Status: CANCELLED | OUTPATIENT
Start: 2022-06-28

## 2022-01-25 RX ORDER — DIPHENHYDRAMINE HYDROCHLORIDE 50 MG/ML
50 INJECTION, SOLUTION INTRAMUSCULAR; INTRAVENOUS ONCE
Status: CANCELLED | OUTPATIENT
Start: 2022-06-28 | End: 2022-06-28

## 2022-01-25 RX ORDER — HEPARIN 100 UNIT/ML
300-500 SYRINGE INTRAVENOUS AS NEEDED
Status: CANCELLED
Start: 2022-06-28

## 2022-01-25 RX ORDER — DIPHENHYDRAMINE HYDROCHLORIDE 50 MG/ML
25 INJECTION, SOLUTION INTRAMUSCULAR; INTRAVENOUS AS NEEDED
Status: CANCELLED
Start: 2022-06-28

## 2022-01-25 RX ORDER — SODIUM CHLORIDE 9 MG/ML
25 INJECTION, SOLUTION INTRAVENOUS CONTINUOUS
Status: DISCONTINUED | OUTPATIENT
Start: 2022-01-25 | End: 2022-01-25 | Stop reason: HOSPADM

## 2022-01-25 RX ORDER — SODIUM CHLORIDE 0.9 % (FLUSH) 0.9 %
10 SYRINGE (ML) INJECTION AS NEEDED
Status: DISCONTINUED | OUTPATIENT
Start: 2022-01-25 | End: 2022-01-25 | Stop reason: HOSPADM

## 2022-01-25 RX ORDER — DIPHENHYDRAMINE HYDROCHLORIDE 50 MG/ML
50 INJECTION, SOLUTION INTRAMUSCULAR; INTRAVENOUS AS NEEDED
Status: CANCELLED
Start: 2022-06-28

## 2022-01-25 RX ORDER — EPINEPHRINE 1 MG/ML
0.3 INJECTION, SOLUTION, CONCENTRATE INTRAVENOUS AS NEEDED
Status: CANCELLED | OUTPATIENT
Start: 2022-06-28

## 2022-01-25 RX ORDER — SODIUM CHLORIDE 9 MG/ML
10 INJECTION INTRAMUSCULAR; INTRAVENOUS; SUBCUTANEOUS AS NEEDED
Status: CANCELLED | OUTPATIENT
Start: 2022-06-28

## 2022-01-25 RX ORDER — ONDANSETRON 2 MG/ML
8 INJECTION INTRAMUSCULAR; INTRAVENOUS AS NEEDED
Status: CANCELLED | OUTPATIENT
Start: 2022-06-28

## 2022-01-25 RX ORDER — DIPHENHYDRAMINE HYDROCHLORIDE 50 MG/ML
50 INJECTION, SOLUTION INTRAMUSCULAR; INTRAVENOUS ONCE
Status: COMPLETED | OUTPATIENT
Start: 2022-01-25 | End: 2022-01-25

## 2022-01-25 RX ADMIN — Medication 10 ML: at 08:48

## 2022-01-25 RX ADMIN — ACETAMINOPHEN 650 MG: 325 TABLET ORAL at 08:42

## 2022-01-25 RX ADMIN — SODIUM CHLORIDE 25 ML/HR: 9 INJECTION, SOLUTION INTRAVENOUS at 08:48

## 2022-01-25 RX ADMIN — DIPHENHYDRAMINE HYDROCHLORIDE 50 MG: 50 INJECTION, SOLUTION INTRAMUSCULAR; INTRAVENOUS at 08:48

## 2022-01-25 NOTE — PROGRESS NOTES
[facility] Rheumatology  OBIC Note    Date: 2022    Rheumatology OBIC COVID-19 SCREENING  The patient was asked the following questions and answered as documented below:    1. Do you have any symptoms of COVID-19? SOB, coughing, fever, or generally not feeling well? NO  2. Have you been exposed to COVID-19 recently? NO  3. Have you had any recent contact with family/friend that has a pending COVID test? NO    Name: Nadege Gallagher  MRN: [id number]       : 1950  Diagnosis: IgG4-related Disease  Treatment: Ruxience 1000mg Day 15  Referring Provider: Dr. Chai De Los Santos  Supervising Provider: Dr. Katiana Brown    Patient arrived to [facility] [address] [facility]. Mr. Cass Elkins allergies reviewed and he agreed to receiving today's treatment. A physical assessment was performed initially and post-treatment. Pt. denies new complaints today. Mr. Divya Mixon vitals were monitored before, during and after medication administration.    Vitals:    22 1030 22 1123 22 1203 22 1243   BP: 125/80 126/79 129/77 126/79   Pulse: (!) 56 (!) 55 (!) 56 62   Resp: 14 14 14 16   Temp:       SpO2: 98% 98% 98% 98%     Medications given per providers orders:  Administrations This Visit     0.9% sodium chloride infusion     Admin Date  2022 Action  New Bag Dose  25 mL/hr Rate  25 mL/hr Route  IntraVENous Administered By  Yelena Beckham RN          acetaminophen (TYLENOL) tablet 650 mg     Admin Date  2022 Action  Given Dose  650 mg Route  Oral Administered By  Yelena Beckham RN          diphenhydrAMINE (BENADRYL) injection 50 mg     Admin Date  2022 Action  Given Dose  50 mg Route  IntraVENous Administered By  Yelena Beckham RN          methylPREDNISolone (PF) (Solu-MEDROL) injection 125 mg     Admin Date  2022 Action  Given Dose  125 mg Route  IntraVENous Administered By  Yelena Beckham RN          riTUXimab-pvvr (RUXIENCE) 1,000 mg in 0.9% sodium chloride 250 mL infusion     Admin Date  2022 Action  New Bag Dose  1,000 mg Rate  25 mL/hr Route  IntraVENous Administered By  Sanjeev Rojas RN          sodium chloride (NS) flush 10 mL     Admin Date  01/25/2022 Action  Given Dose  10 mL Route  IntraVENous Administered By  Sanjeev Rojas RN            Tylenol 325mg tablets x2 (total dose 650mg)  HDD 5029-2414-15  Lot # [id number]  Exp 07/2024     Benadryl 50mg IVP  NDC 35276-604-29  Lot # [id number]  Exp 07/2023     Solumedrol 125mg   NDC 0327-0705-81  Lot # [id number]  Exp 06/2023     250ml bag 0.9% Normal Saline @ 25ml/hr for Yony Oleksandr 7362-9235-02  Lot #  [id number]  Exp 10/2023     0.9% Normal Saline flush x 2  NDC 0161-5632-59  Lot # [id number]  Exp 04/2024     Ruxience 1000mg  DSW 0031-3376-65  Lot # [id number]  Exp 01/2023  ---mixed in---  250ml bag 0.9% Normal Saline  QAQ 1720-4880-71  Lot # [id number]  Exp  11/2022     START: 25ml/hr @ 5228  50ml/hr @ 2837  75ml/hr @ 5525  100ml/hr @ 3755  WZGC: 2903  *Infusion rate increased at longer intervals per family member request*    Lines: 24G left upper FA. Blood return noted and IV site assessed before, during, and after treatment. Line flushed with 10-30 ml's 0.9% Normal Saline solution per protocol. Access was removed from Mr. Parker Moon after completion of infusion/injection. Mr. Parker Moon tolerated the treatment without complaints and no medication reactions were seen while in presence of this RN. Patient provided with AVS, which included future appointments and written educational material regarding Ruxience. All of the patients questions were answered and then discharged ambulatory from Rheumatology OBIC in stable condition at 1310.      Future Appointments   Date Time Provider Marina Warner   3/23/2022  9:00 AM Ronda Mejias BS AMB   3/23/2022  1:15 PM [facility]   7/12/2022  9:00 AM Johanne Acosta MD AOCR BS AMB   7/12/2022  9:00 AM INFUSIONINJ_RC AOCR BS AMB   7/26/2022  9:00 AM INFUSIONINJ_RC AOCR BS SIGIFREDO Mancilla RN  January 25, 2022  1:45pm

## 2022-02-01 NOTE — PROGRESS NOTES
I do hereby attest that this information is true, accurate and complete to the best of my knowledge. I was available for questions and evaluation if needed. Abi Lowe MD  Adult and Pediatric Rheumatology

## 2022-02-07 NOTE — PROGRESS NOTES
I do hereby attest that this information is true, accurate and complete to the best of my knowledge. I was available for questions and evaluation if needed. Abi Alonso MD  Adult and Pediatric Rheumatology

## 2022-03-18 PROBLEM — K83.09: Status: ACTIVE | Noted: 2021-07-01

## 2022-03-18 PROBLEM — D89.84: Status: ACTIVE | Noted: 2021-07-01

## 2022-03-19 PROBLEM — N13.5 RETROPERITONEAL FIBROSIS: Status: ACTIVE | Noted: 2021-07-01

## 2022-03-19 PROBLEM — K68.2 RETROPERITONEAL FIBROSIS: Status: ACTIVE | Noted: 2021-07-01

## 2022-03-20 PROBLEM — D89.84 IGG4 RELATED DISEASE (HCC): Status: ACTIVE | Noted: 2021-07-01

## 2022-03-20 PROBLEM — D89.89 IGG4 RELATED DISEASE: Status: ACTIVE | Noted: 2021-07-01

## 2022-03-23 ENCOUNTER — OFFICE VISIT (OUTPATIENT)
Dept: HEMATOLOGY | Age: 72
End: 2022-03-23
Payer: COMMERCIAL

## 2022-03-23 ENCOUNTER — HOSPITAL ENCOUNTER (OUTPATIENT)
Dept: CT IMAGING | Age: 72
Discharge: HOME | End: 2022-03-23
Attending: INTERNAL MEDICINE
Payer: COMMERCIAL

## 2022-03-23 VITALS
HEIGHT: 61 IN | WEIGHT: 130 LBS | DIASTOLIC BLOOD PRESSURE: 80 MMHG | BODY MASS INDEX: 24.55 KG/M2 | TEMPERATURE: 97.2 F | HEART RATE: 53 BPM | SYSTOLIC BLOOD PRESSURE: 128 MMHG

## 2022-03-23 DIAGNOSIS — N13.30 HYDRONEPHROSIS, UNSPECIFIED HYDRONEPHROSIS TYPE: ICD-10-CM

## 2022-03-23 DIAGNOSIS — K83.09 IGG4-RELATED SCLEROSING CHOLANGITIS: ICD-10-CM

## 2022-03-23 DIAGNOSIS — K75.4 AUTOIMMUNE HEPATITIS (HCC): Primary | ICD-10-CM

## 2022-03-23 DIAGNOSIS — N13.5 RETROPERITONEAL FIBROSIS: ICD-10-CM

## 2022-03-23 DIAGNOSIS — D89.89 IGG4 RELATED DISEASE (HCC): ICD-10-CM

## 2022-03-23 LAB
ALBUMIN SERPL-MCNC: 4.1 G/DL (ref 3.5–5)
ALBUMIN/GLOB SERPL: 1.3 {RATIO} (ref 1.1–2.2)
ALP SERPL-CCNC: 76 U/L (ref 45–117)
ALT SERPL-CCNC: 22 U/L (ref 12–78)
ANION GAP SERPL CALC-SCNC: 2 MMOL/L (ref 5–15)
AST SERPL-CCNC: 19 U/L (ref 15–37)
BASOPHILS # BLD: 0 K/UL (ref 0–0.1)
BASOPHILS NFR BLD: 1 % (ref 0–1)
BILIRUB DIRECT SERPL-MCNC: 0.2 MG/DL (ref 0–0.2)
BILIRUB SERPL-MCNC: 0.6 MG/DL (ref 0.2–1)
BUN SERPL-MCNC: 26 MG/DL (ref 6–20)
BUN/CREAT SERPL: 22 (ref 12–20)
CALCIUM SERPL-MCNC: 9 MG/DL (ref 8.5–10.1)
CHLORIDE SERPL-SCNC: 109 MMOL/L (ref 97–108)
CO2 SERPL-SCNC: 28 MMOL/L (ref 21–32)
COMMENT, HOLDF: NORMAL
CREAT BLD-MCNC: 1.1 MG/DL (ref 0.6–1.3)
CREAT SERPL-MCNC: 1.16 MG/DL (ref 0.7–1.3)
DIFFERENTIAL METHOD BLD: ABNORMAL
EOSINOPHIL # BLD: 0.3 K/UL (ref 0–0.4)
EOSINOPHIL NFR BLD: 4 % (ref 0–7)
ERYTHROCYTE [DISTWIDTH] IN BLOOD BY AUTOMATED COUNT: 13.3 % (ref 11.5–14.5)
GLOBULIN SER CALC-MCNC: 3.1 G/DL (ref 2–4)
GLUCOSE SERPL-MCNC: 107 MG/DL (ref 65–100)
HCT VFR BLD AUTO: 41.2 % (ref 36.6–50.3)
HGB BLD-MCNC: 13 G/DL (ref 12.1–17)
IMM GRANULOCYTES # BLD AUTO: 0 K/UL (ref 0–0.04)
IMM GRANULOCYTES NFR BLD AUTO: 0 % (ref 0–0.5)
LYMPHOCYTES # BLD: 2.3 K/UL (ref 0.8–3.5)
LYMPHOCYTES NFR BLD: 35 % (ref 12–49)
MCH RBC QN AUTO: 29.7 PG (ref 26–34)
MCHC RBC AUTO-ENTMCNC: 31.6 G/DL (ref 30–36.5)
MCV RBC AUTO: 94.1 FL (ref 80–99)
MONOCYTES # BLD: 0.9 K/UL (ref 0–1)
MONOCYTES NFR BLD: 14 % (ref 5–13)
NEUTS SEG # BLD: 3 K/UL (ref 1.8–8)
NEUTS SEG NFR BLD: 46 % (ref 32–75)
NRBC # BLD: 0 K/UL (ref 0–0.01)
NRBC BLD-RTO: 0 PER 100 WBC
PLATELET # BLD AUTO: 278 K/UL (ref 150–400)
PMV BLD AUTO: 10.2 FL (ref 8.9–12.9)
POTASSIUM SERPL-SCNC: 4.6 MMOL/L (ref 3.5–5.1)
PROT SERPL-MCNC: 7.2 G/DL (ref 6.4–8.2)
RBC # BLD AUTO: 4.38 M/UL (ref 4.1–5.7)
SAMPLES BEING HELD,HOLD: NORMAL
SODIUM SERPL-SCNC: 139 MMOL/L (ref 136–145)
WBC # BLD AUTO: 6.4 K/UL (ref 4.1–11.1)

## 2022-03-23 PROCEDURE — 82565 ASSAY OF CREATININE: CPT

## 2022-03-23 PROCEDURE — 99214 OFFICE O/P EST MOD 30 MIN: CPT | Performed by: PHYSICIAN ASSISTANT

## 2022-03-23 PROCEDURE — 74177 CT ABD & PELVIS W/CONTRAST: CPT

## 2022-03-23 PROCEDURE — 74011000636 HC RX REV CODE- 636: Performed by: RADIOLOGY

## 2022-03-23 RX ORDER — OMEPRAZOLE 20 MG/1
20 CAPSULE, DELAYED RELEASE ORAL
COMMUNITY
Start: 2021-10-20 | End: 2022-03-23 | Stop reason: SDUPTHER

## 2022-03-23 RX ORDER — OMEPRAZOLE 20 MG/1
20 CAPSULE, DELAYED RELEASE ORAL DAILY
Qty: 90 CAPSULE | Refills: 2 | Status: SHIPPED | OUTPATIENT
Start: 2022-03-23

## 2022-03-23 RX ADMIN — IOPAMIDOL 100 ML: 755 INJECTION, SOLUTION INTRAVENOUS at 11:07

## 2022-03-23 NOTE — PROGRESS NOTES
[address] \[facility]\"", MD, [address], [place], [place]      Allolayinka Panchal, MATTY Douglas ACNP-BC     Trena Cornell, AGPCNP-BC   SHEILA HartmanP-JEREMIAH Martinez, [facility]-BC       Leroy Paige [facility] 136    at [address]    217 [facility], [address] [place] [address], [facility] 22.    [phone]    FAX: [address] [place] [address] Drive, [address], [address]    [phone]    FAX: [phone]     Patient Care Team:  Kathy Spicer MD as PCP - General (Internal Medicine)  Getachew Summers MD (Gastroenterology)  Antonino Garsia MD (Gastroenterology)  Wilfrido Otto MD (Hematology and Oncology)  Derrick Mcmillan MD (Urology)  Gayle Chang MD (Rheumatology)      Problem List  Date Reviewed: 1/25/2022          Codes Class Noted    IgG4-related sclerosing cholangitis ICD-10-CM: K83.09  ICD-9-CM: 576.1  7/1/2021        IgG4 related disease ([facility] [address] ICD-10-CM: Q09.48  ICD-9-CM: 279.8  7/1/2021        Retroperitoneal fibrosis ICD-10-CM: N13.5  ICD-9-CM: 593.4  7/1/2021        Autoimmune hepatitis ([facility] [address] [address] ICD-10-CM: K75.4  ICD-9-CM: 571.42  9/4/2014    Overview Signed 9/4/2014 10:29 AM by Annamaire Boone MD     Autoimmune etiology             Autoimmune sclerosing pancreatitis [facility]) ICD-10-CM: K86.1  ICD-9-CM: 577.8  6/3/2014              Christopher Aceves returns to the [facility] for management of IGG4 autoimmune cholangitis. The active problem list, all pertinent past medical history, medications, endoscopic studies, radiologic findings and laboratory findings related to the liver disorder were reviewed with the patient. The patient speaks limited [place].   The patient chose to use language translation by a family member who was with the patient during this appointment. The patient is a 71 y.o.  male who developed acute pancreatitis and was noted to have elevated liver enzymes in 2013. Imaging demonstrated enlargement of the pancreas and EUS guided biopsy was consistent with autoimmune pancreatitis. A Liver biopsy in 2013 was consistent with autimmune hepatitis and stage 3 fibrosis. He has been maintained on tacrolimus 1 mg every day since 2016. The liver transaminases have remained normal and there has been no recurrence of pancreatitis. 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 This is associated with IGG4 autoimmune pancreatitis and autoimmune cholangitis. He s now being seen by Dr Josué Escoto who started Retuximab for autoimmune retroperitoneal fibrosis and has stopped tacrolimus. He has had 2 infusions thus far and is having these done every 6 months, next due in 7/2022. He has tolerated this well. The patient does not have any symptoms which could be attributed to the liver disorder. The patient is not experiencing the following symptoms which are commonly seen in this liver disorder:   fatigue, pain in the right side over the liver, diffuse abdominal pain arthralgias, myalgias. The patient completes all daily activities without any functional limitations. ASSESSMENT AND PLAN:  Autoimmune Hepatitis/cholangitis   The diagnosis was based upon serology, liver biopsy, other coexistant autoimmune disorders    A liver biopsy performed in 2013 shows moderate inflammation and Stage 3 bridging fibrosis. Fibroscan has been performed annually since 2016. The most recent Fibroscan in 2021 demonstrated a liver stiffness of 5.4 consistent with stage 0-1 fibrosis    The patient was initially treated with steroids for both autoimmune hepatitis and pancreatitis. He was tried on 6MP but relapsed when steroids were tapered. He was intolerant to cellcept.   He was well controlled on prednisone and Azathioprine but would relapse whenever the dose of steroids was lowered to under 20 mg every day. He was started on tacrolimus in 2016 and with this was able to taper and stop both prednsione and azathioprine without relapse of ether autoimmune hepatitis or pancreatitis. He was maintained on low dose TAC 1 mg every day since 2016. He was recently found to have retroperitoneal fibrosis which is associated with IGG4 autoimmne cholangitis and immune pancreatitis. He has seen and now co-managed by Raudel Pete and now Dr Maria Luisa Ferris. Dr Stephanie Richey has started Retuximab for retroperitoneal fibrosis and stopped tacrolimus in late Summer 2021. He has not had recent labs done and this will need to be repeated. I have drawn labs today and will forward to his medical team.     Serologic testing was negative for IGG4 and positive for APRIL, ASMA. The Fibroscan will continue to be repeated annually or as often as clinically indicated to assess for fibrosis progression and/or regression. Will plan for repeat at his follow-up in 6 months. Autoimmune pancreatitis  The patient developed acute pancreatitis without obvious etiology in 2013. The pancreas was diffusely enlarged consistent with autoimmune pancreatitis. An EUS with biopsy of the pancreas demonstrated inflammation and was consistent with autoimmune pancreatitis. The patient was initially treated with steroids for both autoimmune hepatitis and pancreatitis in 2013. He recieved several different treatments for the 2 immune diseases as noted above. He was started on tacrolimus in 2016 and with this was able to taper and stop both prednsione and azathioprine without relapse of ether autoimmune hepatitis or pancreatitis. He is only being maintained on rituxan every 6 months at this point. 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 This was managed by Dr Shivam Regalado.   The CBD stricture was treated with stenting but did not resolve until a metal stent was placed in 4/2016. This was removed in 9/2018. Retroperitoneal fibrosis  The patient was found to have right hydronephrosis in 2/2021. This was treated with stenting of the ureter. He reports that he will likely need to have stent removal with Dr Jaye Garrido in 5/2022 follow-up. There were enlarged nodes in the retroperitoneum. A biopsy of the enlarged nodes was negative for malignancy. A bone marrow biopsy was negative. He is scheduled to have CT scan today for assessment of retroperitoneal fibrosis. Further imaging demonstrated uretal stcricture was due to retroperitoneal fibrosis. This is being treated by  [facility] with Retuximab. Treatment of other medical problems in patients with chronic liver disease  There are no contraindications for the patient to take most medications that are necessary for treatment of other medical issues. Normal doses of acetaminophen, as recommended on the label of the bottle, are not hepatotoxic except in the setting of daily alcohol use, even in patients with cirrhosis and can be utilized for pain. Counseling for alcohol in patients with chronic liver disease  The patient was counseled regarding alcohol consumption and the effect of alcohol on chronic liver disease. The patient does not consume any significant amount of alcohol. Vaccinations   Vaccination for viral hepatitis B is not needed. The patient has serologic evidence of prior exposure or vaccination with immunity. The patient has received 2 doses of COVID-19 vaccine. Routine vaccinations against other bacterial and viral agents can be performed as indicated. Annual flu vaccination should be administered if indicated. ALLERGIES:  No Known Allergies    MEDICATIONS  Current Outpatient Medications   Medication Sig    carvediloL (COREG) 3.125 mg tablet Take  by mouth.  tamsulosin (FLOMAX) 0.4 mg capsule Take  by mouth.     Lactobacillus acidophilus (PROBIOTIC PO) Take  by mouth.  calcium-cholecalciferol, d3, (CALCIUM 600 + D) 600-125 mg-unit tab Take  by mouth.  Omega-3 Fatty Acids (FISH OIL) 300 mg cap Take  by mouth.  GLUCOSAM/CHOND/HYALU/CF BORATE (MOVE FREE Backand PO) Take  by mouth. No current facility-administered medications for this visit. SYSTEM REVIEW NOT RELATED TO LIVER DISEASE OR REVIEWED ABOVE:  Constitution systems: Negative for fever, chills, weight gain, weight loss. Eyes: Negative for visual changes. ENT: Negative for sore throat, painful swallowing. Respiratory: Negative for cough, hemoptysis, SOB. Cardiology: Negative for chest pain, palpitations. GI:  Negative for constipation or diarrhea. Occasional heartburn symptoms. : Negative for urinary frequency, dysuria, hematuria, nocturia. Skin: Negative for rash. Hematology: Negative for easy bruising, blood clots. Musculo-skeletal: Negative for back pain, muscle pain, weakness. Neurologic: Negative for headaches, dizziness, vertigo, memory problems not related to HE. Psychology: Negative for anxiety, depression. FAMILY HISTORY:  The father  of colon cancer. The mother has the following chronic diseases: has an autoimmune disease. There is no family history of liver disease. There is a family history of autoimmune diseases. SOCIAL HISTORY:  The patient is . The patient has 2 children. The patient has never used tobacco products. The patient has never consumed significant amounts of alcohol. The patient currently works full time in the [employer] in [place]. PHYSICAL EXAMINATION:  Visit Vitals  /80 (BP 1 Location: Right arm, BP Patient Position: Sitting, BP Cuff Size: Adult)   Pulse (!) 53   Temp 97.2 °F (36.2 °C) (Temporal)   Ht 5' 1\" (1.549 m)   Wt 130 lb (59 kg)   BMI 24.56 kg/m²     General: No acute distress. Eyes: Sclera anicteric. ENT: No oral lesions.     Nodes: No adenopathy. Skin: No spider angiomata. No jaundice. No palmar erythema. Respiratory: Lungs clear to auscultation. Cardiovascular: Regular heart rate. No murmurs. No JVD. Abdomen: Soft non-tender. Liver size normal to percussion/palpation. Spleen not palpable. No obvious ascites. Extremities: No edema. No muscle wasting. No gross arthritic changes. Neurologic: Alert and oriented. Cranial nerves grossly intact. No asterixis. LABORATORY STUDIES:  [address] & Units 9/21/2021   WBC 4.1 - 11.1 K/uL 5.1   ANC 1.8 - 8.0 K/UL 1.7 (L)   HGB 12.1 - 17.0 g/dL 12.6    - 400 K/uL 247   AST 15 - 37 U/L 24   ALT 12 - 78 U/L 25   Alk Phos 45 - 117 U/L 107   Bili, Total 0.2 - 1.0 MG/DL 0.4   Bili, Direct 0.0 - 0.2 MG/DL 0.1   Albumin 3.5 - 5.0 g/dL 4.4   BUN 6 - 20 MG/DL 24 (H)   Creat 0.70 - 1.30 MG/DL 1.14   Na 136 - 145 mmol/L 141   K 3.5 - 5.1 mmol/L 4.4   Cl 97 - 108 mmol/L 112 (H)   CO2 21 - 32 mmol/L 24   Glucose 65 - 100 mg/dL 93   Additional lab values drawn at today's office visit are pending at the time of documentation. SEROLOGIES:  10/2013. HBsAntigen negative, anti-HBcore positive, anti-HBsurface positive, HBE antigen negative, Anti-HBE positive,HBV DNA undetectable, Ferritin 797. Serologies Latest Ref Rng 6/3/2014   APRIL, IFA  See patterns   APRIL Pattern  1:160 (H)   C-ANCA Neg:<1:20 titer <1:20   P-ANCA Neg:<1:20 titer <1:20   ANCA Neg:<1:20 titer <1:20   ASMCA 0 - 19 Units 25 (H)   M2 Ab 0.0 - 20.0 Units 3.8     Cancer Screening Latest Ref Rng & Units 10/11/2017 6/3/2014   CA 19-9 0 - 35 U/mL 8 69 (H)   CEA ng/mL 0.5    CEA 0.0 - 4.7 ng/mL  1.2     7/2013.  17.     Serologies Latest Ref Rng & Units 4/13/2021   Hep B Surface Ag Index <0.10   Hep B Surface Ag Interp Negative   Negative   Hep B Core Ab, Total Negative   Negative   Hep B Surface Ab mIU/mL 118.90   Hep B Surface Ab Interp NR   REACTIVE (A)   Hep C Ab NONREACTIVE   NONREACTIVE       LIVER HISTOLOGY:  2013. Reviewed by MLS. Moderate inflammation with stage 3 fibrosis. 8/2016. FibroScan performed at [facility]. EkPa was 7.4. Suggested fibrosis level is F1.    11/2017. FibroScan performed at [facility]. EkPa was 8.1. Suggested fibrosis level is F1.    1/2019. FibroScan performed at [facility]. EkPa was 6.3. Suggested fibrosis level is F1. CAP score is 330, suggestive of significant steatosis. 12/2019. FibroScan performed at [facility]. EkPa was 6.6. Suggested fibrosis level is F0-1. CAP score is 286, this is consistent with steatosis. 7/2021. FibroScan performed at [facility]. EkPa was 5.4. Suggested fibrosis level is F0-1. CAP score 181, steatosis. ENDOSCOPIC PROCEDURES:  5/2013. EGD by Dr August Horne. Gastritis and small gastric ulcer. 7/2013. EGD by Dr August Horne. Healed gastritis and .  12/2015. ERCP. Common bile duct stricture. Stent placed. 4/2016. ERCP by Dr Romulo Crane. Pancreatic stricture. Covered metal stent placed. 11/2017. ERCP By Dr. Romulo Crane. Bile duct stricture and stented. Bile duct stone-extracted. 5/2018. ERCP by Dr. Romulo Crane. Bile duct stricture-stent changed. Abnormal mucosa ampulla-biopsied  9/2018. ERCP by Dr. Romulo Crane. Bile duct stricture-resolved. Biliary sludge-extracted. RADIOLOGY:  10/2013. MRI of liver. Diffuse swelling of pancreas with peripancreatic edema. Encasement of CDB by edematous pancreas head. Normal appearing liver. No liver mass lesions. Normal spleen. No dilated bile ducts. No bile duct strictures. No ascites. 8/2014. MRI of liver. Short yet severe stricture of the common duct within the pancreatic head without associated pancreatic ductal or intrahepatic biliary dilatation. There is questionable subtle decreased contrast enhancement within the pancreatic head. ERCP with endoscopic ultrasound suggested for further evaluation. 11/2015. MRCP.  Obstruction of CBD in region of pennie hepatitis. Distal CBD is normal. Proximal to obstruction the intrahepatic bile ducts are dilated. 2019. MRI performed in [facility]. Per patient no findings, possible pancreatic atrophy.  2021. CT abdomen. No liver lesion noted. Right hydronephrosis seen. 2021. CT abdomen. Infiltrative retroperitoneal mass, encasing the right common iliac artery and right ureter, demonstrates no significant change in size since the 2021 examination. Moderately severe right-sided hydronephrosis and hydroureter; the right ureteral stent is in satisfactory position. 5 mm right lower lobe pleural-based pulmonary nodule demonstrates no significant change since 2021.  3/2022. CT abdomen. Pending at the time of office visit. OTHER TESTIN2021. Bone marrow biopsy negative for malignancy  2021. Retroperitoneal Lymph node biopsy negative for malignancy    FOLLOW-UP:  All of the issues listed above in the Assessment and Plan were discussed with the patient. All questions were answered. The patient expressed a clear understanding of the above. [address] [place] [address] in 6 months for routine monitoring and repeat Fibroscan. Will contact patient with today's labs and any directive for change in care plan.      Ru Flynn PA-C  Liver [place] [facility],  [facility] 22.  [phone]  [address] [place] [address]

## 2022-03-23 NOTE — PROGRESS NOTES
Identified pt with two pt identifiers(name and ). Reviewed record in preparation for visit and have obtained necessary documentation. Chief Complaint   Patient presents with    Other     [address] f/u with [place]:    22 0843   BP: 128/80   Pulse: (!) 53   Temp: 97.2 °F (36.2 °C)   TempSrc: Temporal   Weight: 130 lb (59 kg)   Height: 5' 1\" (1.549 m)   PainSc:   0 - No pain       Health Maintenance Review: Patient reminded of \"due or due soon\" health maintenance. I have asked the patient to contact his/her primary care provider (PCP) for follow-up on his/her health maintenance. Coordination of Care Questionnaire:  :   1) Have you been to an emergency room, urgent care, or hospitalized since your last visit? If yes, where when, and reason for visit? no       2. Have seen or consulted any other health care provider since your last visit? If yes, where when, and reason for visit? NO      Patient is accompanied by spouse I have received verbal consent from Christopher Aceves to discuss any/all medical information while they are present in the room.

## 2022-03-24 NOTE — PROGRESS NOTES
Pt notified of stable findings and continued normal liver enzymes off of oral immunosuppressants and with Rituxan only. Will forward results to Dr Zack Michelle for review as well.

## 2022-07-07 NOTE — PROGRESS NOTES
[facility]  OBIC Note    Date: 2022  Name: Adi Waterman  MRN: [id number]       : 1950  Diagnosis: IgG4-related Sclerosing Cholangitis  Treatment: Ruxience 1000mg Day 1  Referring Provider: Dr. Uzma Mayer  Supervising Provider: Dr. Uzma Mayer    Patient arrived to [facility] at [address] Mr. Sarahy Armas allergies reviewed and he agreed to receiving today's treatment. A physical assessment was performed initially and post-treatment. Pt. denies new complaints today. Dr. Uzma Mayer in [facility] to see patient today. Mr. Justin Sanchez vitals were monitored before, during and after medication administration. Vitals:    22 1017 22 1050 22 1120 22 1151   BP: 133/82 126/75 133/85 132/83   Pulse: (!) 52 (!) 51 (!) 50 (!) 53   Resp: 14 14 14 16   Temp:       SpO2: 98% 98% 98% 97%     CBC drawn and walked to [facility].     Medications given per providers orders:  Administrations This Visit     0.9% sodium chloride infusion     Admin Date  2022 Action  New Bag Dose  25 mL/hr Rate  25 mL/hr Route  IntraVENous Administered By  Diana Monzon RN          acetaminophen (TYLENOL) tablet 650 mg     Admin Date  2022 Action  Given Dose  650 mg Route  Oral Administered By  Diana Monzon RN          diphenhydrAMINE (BENADRYL) injection 50 mg     Admin Date  2022 Action  Given Dose  50 mg Route  IntraVENous Administered By  Diana Monzon RN          methylPREDNISolone (PF) (Solu-MEDROL) injection 125 mg     Admin Date  2022 Action  Given Dose  125 mg Route  IntraVENous Administered By  Diana Monzon RN          riTUXimab-pvvr (Mauro Terry) 1,000 mg in 0.9% sodium chloride 250 mL infusion     Admin Date  2022 Action  New Bag Dose  1,000 mg Rate  25 mL/hr Route  IntraVENous Administered By  Diana Monzon RN          sodium chloride (NS) flush 10 mL     Admin Date  2022 Action  Given Dose  10 mL Route  IntraVENous Administered By  Diana Monzon RN                Acetaminophen 325mg tablets x 2 (650mg total)  NDC 2821-6862-99  Lot # [id number]  Exp 12/2024     Benadryl 50mg IVP   DIN 62968-930-53  Lot # [id number]  Exp 01/2024     125mg Solumedrol IVP  [facility] 7523-1608-09  Lot # [id number]  Exp 08/2024     1000ml bag 0.9% Normal Saline @ 25ml/hr  NDC 1479-0698-48  Lot # [id number]  Exp 08/2023     0.9% Normal Saline flush x 2  NDC 6180-0143-00  Exp 04/2024     Ruxience 1000mg  BCT 2696-5555-84  Lot # [id number]  Exp 05/2023  ---mixed in---  250ml bag 0.9% Normal Saline  LEV 8880-3773-08  Lot # [id number]  Exp  1/31/2023     START: 25ml/hr @ 0177  50ml/hr @ 9285  75ml/hr @ 5374  100ml/hr @ 4603  JLNR: 3779    Lines: 24G left FA. Blood return noted and IV site assessed before, during, and after treatment. Line flushed with 10-30 ml's 0.9% Normal Saline solution per protocol. Access was removed from Mr. Nydia Mosley after completion of infusion/injection. Mr. Nydia Mosley tolerated the treatment without complaints and no medication reactions were seen while in presence of this RN. Patient provided with AVS, which included future appointments and written educational material regarding Ruxience. All of the patients questions were answered and then discharged ambulatory from Rheumatology OBIC in stable condition at 1230. Encounter routed to supervising provider for co-signing.      Future Appointments   Date Time Provider Marina Warner   7/26/2022  9:00 AM INFUSIONINJ_RC AOCR BS AMB   10/5/2022  9:00 AM DONALD Dias BS AMB   1/11/2023  9:00 AM INFUSIONINJ_RC AOCR BS AMB   1/25/2023  9:00 AM MD HELGA Ron BS AMB   1/25/2023  9:00 AM INFUSIONINJ_RC AOCR BS SIGIFREDO Nina RN  July 12, 2022

## 2022-07-11 DIAGNOSIS — K83.09 IGG4-RELATED SCLEROSING CHOLANGITIS: Primary | ICD-10-CM

## 2022-07-12 ENCOUNTER — OFFICE VISIT (OUTPATIENT)
Dept: RHEUMATOLOGY | Age: 72
End: 2022-07-12
Payer: COMMERCIAL

## 2022-07-12 ENCOUNTER — OFFICE VISIT (OUTPATIENT)
Dept: RHEUMATOLOGY | Age: 72
End: 2022-07-12

## 2022-07-12 VITALS
RESPIRATION RATE: 16 BRPM | TEMPERATURE: 97 F | DIASTOLIC BLOOD PRESSURE: 82 MMHG | HEART RATE: 54 BPM | SYSTOLIC BLOOD PRESSURE: 131 MMHG | OXYGEN SATURATION: 98 %

## 2022-07-12 VITALS
SYSTOLIC BLOOD PRESSURE: 159 MMHG | OXYGEN SATURATION: 98 % | RESPIRATION RATE: 16 BRPM | WEIGHT: 130.29 LBS | DIASTOLIC BLOOD PRESSURE: 83 MMHG | HEART RATE: 57 BPM | TEMPERATURE: 97 F | BODY MASS INDEX: 24.62 KG/M2

## 2022-07-12 DIAGNOSIS — K83.09 IGG4-RELATED SCLEROSING CHOLANGITIS: Primary | ICD-10-CM

## 2022-07-12 DIAGNOSIS — K83.09 SCLEROSING CHOLANGITIS: ICD-10-CM

## 2022-07-12 DIAGNOSIS — D89.89 IGG4 RELATED DISEASE (HCC): Primary | ICD-10-CM

## 2022-07-12 DIAGNOSIS — N13.5 RETROPERITONEAL FIBROSIS: ICD-10-CM

## 2022-07-12 DIAGNOSIS — D89.89 IGG4 RELATED DISEASE (HCC): ICD-10-CM

## 2022-07-12 PROCEDURE — 96374 THER/PROPH/DIAG INJ IV PUSH: CPT

## 2022-07-12 PROCEDURE — 96415 CHEMO IV INFUSION ADDL HR: CPT

## 2022-07-12 PROCEDURE — 96375 TX/PRO/DX INJ NEW DRUG ADDON: CPT

## 2022-07-12 PROCEDURE — 99215 OFFICE O/P EST HI 40 MIN: CPT | Performed by: PEDIATRICS

## 2022-07-12 PROCEDURE — 96413 CHEMO IV INFUSION 1 HR: CPT

## 2022-07-12 PROCEDURE — 1123F ACP DISCUSS/DSCN MKR DOCD: CPT | Performed by: PEDIATRICS

## 2022-07-12 RX ORDER — HEPARIN 100 UNIT/ML
300-500 SYRINGE INTRAVENOUS AS NEEDED
Status: CANCELLED
Start: 2022-07-24

## 2022-07-12 RX ORDER — SODIUM CHLORIDE 9 MG/ML
25 INJECTION, SOLUTION INTRAVENOUS CONTINUOUS
Status: DISCONTINUED | OUTPATIENT
Start: 2022-07-12 | End: 2022-07-12 | Stop reason: HOSPADM

## 2022-07-12 RX ORDER — SODIUM CHLORIDE 9 MG/ML
25 INJECTION, SOLUTION INTRAVENOUS CONTINUOUS
Status: CANCELLED | OUTPATIENT
Start: 2022-07-24

## 2022-07-12 RX ORDER — ONDANSETRON 2 MG/ML
8 INJECTION INTRAMUSCULAR; INTRAVENOUS AS NEEDED
Status: CANCELLED | OUTPATIENT
Start: 2022-07-24

## 2022-07-12 RX ORDER — DIPHENHYDRAMINE HYDROCHLORIDE 50 MG/ML
50 INJECTION, SOLUTION INTRAMUSCULAR; INTRAVENOUS ONCE
Status: COMPLETED | OUTPATIENT
Start: 2022-07-12 | End: 2022-07-12

## 2022-07-12 RX ORDER — ALBUTEROL SULFATE 0.83 MG/ML
2.5 SOLUTION RESPIRATORY (INHALATION) AS NEEDED
Status: CANCELLED
Start: 2022-07-24

## 2022-07-12 RX ORDER — DIPHENHYDRAMINE HYDROCHLORIDE 50 MG/ML
50 INJECTION, SOLUTION INTRAMUSCULAR; INTRAVENOUS ONCE
Status: CANCELLED | OUTPATIENT
Start: 2022-07-24 | End: 2022-07-24

## 2022-07-12 RX ORDER — ACETAMINOPHEN 325 MG/1
650 TABLET ORAL ONCE
Status: CANCELLED | OUTPATIENT
Start: 2022-07-24 | End: 2022-07-24

## 2022-07-12 RX ORDER — EPINEPHRINE 1 MG/ML
0.3 INJECTION, SOLUTION, CONCENTRATE INTRAVENOUS AS NEEDED
Status: CANCELLED | OUTPATIENT
Start: 2022-07-24

## 2022-07-12 RX ORDER — SODIUM CHLORIDE 9 MG/ML
10 INJECTION INTRAMUSCULAR; INTRAVENOUS; SUBCUTANEOUS AS NEEDED
Status: CANCELLED | OUTPATIENT
Start: 2022-07-24

## 2022-07-12 RX ORDER — DIPHENHYDRAMINE HYDROCHLORIDE 50 MG/ML
50 INJECTION, SOLUTION INTRAMUSCULAR; INTRAVENOUS AS NEEDED
Status: CANCELLED
Start: 2022-07-24

## 2022-07-12 RX ORDER — HYDROCORTISONE SODIUM SUCCINATE 100 MG/2ML
100 INJECTION, POWDER, FOR SOLUTION INTRAMUSCULAR; INTRAVENOUS AS NEEDED
Status: CANCELLED | OUTPATIENT
Start: 2022-07-24

## 2022-07-12 RX ORDER — ACETAMINOPHEN 325 MG/1
650 TABLET ORAL AS NEEDED
Status: CANCELLED
Start: 2022-07-24

## 2022-07-12 RX ORDER — ACETAMINOPHEN 325 MG/1
650 TABLET ORAL ONCE
Status: COMPLETED | OUTPATIENT
Start: 2022-07-12 | End: 2022-07-12

## 2022-07-12 RX ORDER — SODIUM CHLORIDE 0.9 % (FLUSH) 0.9 %
10 SYRINGE (ML) INJECTION AS NEEDED
Status: DISCONTINUED | OUTPATIENT
Start: 2022-07-12 | End: 2022-07-12 | Stop reason: HOSPADM

## 2022-07-12 RX ORDER — SODIUM CHLORIDE 0.9 % (FLUSH) 0.9 %
10 SYRINGE (ML) INJECTION AS NEEDED
Status: CANCELLED | OUTPATIENT
Start: 2022-07-24

## 2022-07-12 RX ORDER — DIPHENHYDRAMINE HYDROCHLORIDE 50 MG/ML
25 INJECTION, SOLUTION INTRAMUSCULAR; INTRAVENOUS AS NEEDED
Status: CANCELLED
Start: 2022-07-24

## 2022-07-12 RX ADMIN — DIPHENHYDRAMINE HYDROCHLORIDE 50 MG: 50 INJECTION, SOLUTION INTRAMUSCULAR; INTRAVENOUS at 08:56

## 2022-07-12 RX ADMIN — Medication 10 ML: at 08:56

## 2022-07-12 RX ADMIN — ACETAMINOPHEN 650 MG: 325 TABLET ORAL at 08:50

## 2022-07-12 RX ADMIN — SODIUM CHLORIDE 25 ML/HR: 9 INJECTION, SOLUTION INTRAVENOUS at 08:56

## 2022-07-12 NOTE — PROGRESS NOTES
RHEUMATOLOGY PROBLEM LIST AND CHIEF COMPLAINT  1. IgG-4 retroperitoneal fibrosis  2. Sclerosing cholangitis     Immunosuppression Screening:  Quantiferon TB: negative  PPD:  Not performed  Hepatitis B: negative (4/13/2021)  Hepatitis C: negative (4/13/2021)    Therapy History includes:  Current DMARD therapy include: rituximab 1000 mg IV every 6 months (7/14/2021 to present)  Prior DMARD therapy include: None  Discontinued DMARDs because of inefficacy: None  Discontinued DMARDs because of side effects: None      INTERVAL HISTORY  This is a 71 y.o.  male. Today, the patient complains of no pain in the joints. Location: joints  Severity:  0 on a scale of 0-10  Timing: all day   Duration:  Since last visit  Modifying factors: rituixmab  Context/Associated signs and symptoms: This is a patient that was previously seen by Dr. Romina Nogueira who I will be taking over. Since his last visit he has been doing well. He recently had a CT scan of his abdomen and pelvis which showed improvement of overall retroperitoneal fibrosis. He is currently receiving rituximab 1 g today and will receive this again 2 weeks with every 6-month cycles. He was recently seen by gastroenterology and was noted to be stable from an autoimmune hepatitis standpoint. We discussed future CT scans as well as treatment. He has no complaints today.     RHEUMATOLOGY REVIEW OF SYSTEMS   Positives as per history  Negatives as follows:  [place]:  Denies unexplained persistent fevers, weight change, chronic fatigue  HEAD/EYES:   Denies eye redness, blurry vision or sudden loss of vision, dry eyes, HA, temporal artery pain  ENT:    Denies oral/nasal ulcers, recurrent sinus infections, dry mouth  RESPIRATORY:  No pleuritic pain, history of pleural effusions, hemoptysis, exertional dyspnea  CARDIOVASCULAR: Denies chest pain, history of pericardial effusions  GASTRO:   Denies heartburn, esophageal dysmotility, abdominal pain, nausea, vomiting, diarrhea, blood in the stool  HEMATOLOGIC:  No easy bruising, purpura, swollen lymph nodes  SKIN:    Denies alopecia, ulcers, nodules, sun sensitivity, unexplained persistent rash   VASCULAR:   Denies edema, cyanosis, raynaud phenomenon  NEUROLOGIC:  Denies specific muscle weakness, paresthesias   PSYCHIATRIC:  No sleep disturbance / snoring, depression, anxiety  MSK:    No morning stiffness >1 hour, SI joint pain, persistent joint swelling, persistent joint pain    PAST MEDICAL, SOCIAL AND FAMILY HISTORY  Reviewed with patient, significant changes in medical history - yes    PHYSICAL EXAM  Blood pressure (!) 159/83, pulse (!) 57, temperature 97 °F (36.1 °C), resp. rate 16, weight 130 lb 4.7 oz (59.1 kg), SpO2 98 %. GENERAL APPEARANCE: Well-nourished, no acute distress  NECK: No adenopathy  ENT: No oral ulcers  CARDIOVASCULAR: Heart rhythm is regular. No murmur, rub, gallop  CHEST: Normal vesicular breath sounds. No wheezes, rales, pleural friction rubs  ABDOMINAL: The abdomen is soft and nontender. Bowel sounds are normal  SKIN: No rash, palpable purpura, digital ulcer, abnormal thickening   MUSCULOSKELETAL:   Upper extremities - full range of motion, no tenderness, no swelling, no synovial thickening and no deformity of joints  Lower extremities - full range of motion, no tenderness, no swelling, no synovial thickening and no deformity of joints     LABS, RADIOLOGY AND PROCEDURES  Previous labs reviewed -Yes  Previous radiology reviewed -Yes  Previous procedures reviewed -Yes  Previous medical records reviewed/summarized -Yes    CT Imaging    CT Abdomen and Pelvis with contrast 12/14/2021: Abdomen: A sub-6 mm, subpleural nodule in the right lower lobe requires no follow-up. The lung bases are otherwise clear. The heart is at the upper limits of normal in size. The coronary sinus is dilated (11 mm).  The distal esophagus, stomach, duodenum, liver, gallbladder, pancreas, spleen, adrenals, and left kidney are normal. Retroperitoneum: Right hydronephrosis is mild to moderate, despite an appropriately positioned ureteral stent. Hydronephrosis is decreased from 6/9/2021, however. Enhancement of the urothelium and inflammation of the renal sinus fat suggests pyelitis. No right renal perfusion defects to suggest pyelonephritis. There is mild right renal atrophy relative to the left kidney. The bilateral common iliac arteries are encased by abnormal soft tissue in the inferior retroperitoneum. This extends to the aortic bifurcation, and probably around the bilateral internal iliac arteries. This is the cause of right ureteral obstruction. On prior studies, the thickness of this rind of soft tissue was greater, more consistent with fibrosis than vasculitis. Today, it measures 6 mm in greatest thickness at the level of the right common iliac artery (3-43), versus 8 mm on 6/9/2021 and 10 mm on 3/31/2021 at similar levels. Pelvis: There is a large volume of stool throughout the colon. The small bowel, ileocecal junction, and bladder are normal. The appendix is not visualized; no pericecal inflammatory process. No free air or fluid, and no abdominopelvic lymphadenopathy. CT Abdomen and Pelvis with contrast 6/09/2021: LOWER THORAX: 5 mm pleural-based nodule in the right lower lobe, unchanged since the April 27, 2021 PET CT. LIVER: No mass. BILIARY TREE: Gallbladder is within normal limits. CBD is not dilated. SPLEEN: within normal limits. PANCREAS: No mass or ductal dilatation. ADRENALS: Unremarkable. KIDNEYS: Moderately severe right-sided hydronephrosis and hydroureter. Right-sided ureteral stent is in satisfactory position. STOMACH: Unremarkable. SMALL BOWEL: No dilatation or wall thickening. COLON: No dilatation or wall thickening. APPENDIX: Normal. PERITONEUM: No ascites or pneumoperitoneum.  RETROPERITONEUM: Infiltrative retroperitoneal mass encases the right common iliac artery and right ureter; its largest component measures 2.8 x 2.6 cm, previously 2.7 x 2.7 cm. Subcentimeter retroperitoneal lymph nodes are not significantly changed. REPRODUCTIVE ORGANS: Unremarkable URINARY BLADDER: No mass or calculus. BONES: No destructive bone lesion. ABDOMINAL WALL: No mass or hernia. PET/CT Scan 4/27/2021: HEAD/NECK: There is a 1 cm supraclavicular lymph node with SUV of 3.2. CHEST: There are normal sized mediastinal and hilar lymph nodes with slight increased metabolic activity of 2.8. ABDOMEN/PELVIS: Right ureteral stent is noted in position. There is increased soft tissue density at the aortic bifurcation with slight increased metabolic activity of 4. SKELETON: No foci of abnormal hypermetabolism in the axial and visualized appendicular skeleton. CT Abdomen and Pelvis with contrast 10/15/2015: Visualized lower Chest: Lungs/Pleura: Within normal limits Heart/vessels: Normal. Abdomen and pelvis: Liver: Normal. Biliary: Post cholecystectomy. There is new intrahepatic severe biliary dilation with ducts converging at the level of the hilum where the lumen becomes completely collapse. No clearly evident mass is identified however there is suggestion of some enhancement of the duct because of the common hepatic duct just as it exits the area of collapse. Distally the common duct distends to approximately 9 mm to 10 taper within the pancreatic head at the site of previous stricture identified on MRI within the pancreatic head. Spleen: Normal. Pancreas: Normal.  No duct dilation. Adrenals: Normal. Urinary: Normal appearance to kidneys, ureters, and bladder. Peritoneum/Mesenteries: Normal. Gastrointestinal tract: Normal.  No dilation or inflammatory changes. Vascular: Atherosclerotic calcination is without aneurysm. Reproductive System: Seminal vesicles and prostate appear unremarkable. MSK: Spondylosis with no aggressive lesions of bone. MR Imaging    MRI Abdomen with and without contrast 11/12/2015: Liver:  No steatosis.  Interval development of diffuse intrahepatic biliary ductal dilatation related to central obstruction. Subtle mild T2 hyperintensity is seen centrally near the level of obstruction. In addition, there is ill-defined masslike enhancement centrally best seen on series 9 image 39 measuring approximately 3.3 x 4.0 cm. There is nodular soft tissue thickening and enhancement extending along the common hepatic duct into the common bile duct. Gallbladder:  Mildly thickwalled, but underdistended gallbladder. No filling defect. Spleen, pancreas, and adrenal glands:   Normal in signal. No focal abnormality. Kidneys:  Symmetric enhancement. No hydronephrosis. MRCP:  Severe, diffuse intrahepatic biliary ductal dilatation related to complete central common hepatic/common bile duct obstruction near the pennie hepatis. The common bile duct is patent the distal to the level of  obstruction all the way to the ampulla with no filling defect or stricture. Normal course and caliber of the pancreatic duct. Other findings:  No ascites. No lymphadenopathy. MRI/MRCP Abdomen with and without contrast 8/09/20214: The common bile is at upper limits of normal size at 6 mm. There is a short segment stricture in the region of the pancreatic head. There is no associated biliary ductal dilatation. Although there is no definite pancreatic head mass, there may be very subtle lower signal intensity within the pancreatic head on postcontrast imaging. This is a questionable finding. There is no intrahepatic biliary dilatation. No focal liver lesion. Liver spleen and kidneys are normal. No adenopathy. PATHOLOGY    Lymph node, right supraclavicular, core biopsy with touch preparation 5/21/2021: Lymphoid tissue with no definitive morphologic, immunohistochemical, or flow cytometric evidence of a lymphoproliferative disorder. No cells diagnostic for malignancy.  Bone marrow, posterior iliac crest, aspirate, clot section, and decalcified core biopsy: Normocellular marrow with maturing trilineage hematopoiesis. No morphologic or immunophenotypic evidence of a lymphoproliferative disorder. Flow cytometry shows no diagnostic immunophenotypic abnormalities. Ampulla of Vater, biopsy 5/11/2018: Small bowel and ampullary mucosa with chronic active inflammation. No malignancy identified     Common bile duct biopsy 1/03/2018: Scattered detached ductal epithelial cells with reactive type changes with background fibrin and blood. Ampullary biopsy 1/03/2018: Benign ductal and small intestinal epithelium and stroma with reactive changes. Bile duct, biopsy 10/11/2017: Predominantly unremarkable small intestinal mucosa with preserved villous architecture. Scant fragment of benign bile duct mucosa with chronic inflammation     Bile duct, biopsy 4/27/2016: Benign bile duct epithelium with acute and chronic inflammation and reactive changes. Abundant granulation tissue. Negative for dysplasia or malignancy     ASSESSMENT   1.  IgG4 related sclerosing cholangitis with retroperitoneal fibrosis (Established problem -  Stable disease) -this is currently stable, with good response to rituximab infusions. I recommend he repeat a CT scan 6 months from the most previous CT scan and if this is stable and continues to show improvement we will obtain one in 2023. PLAN  1. Continue rituximab 1 g every 14 days with cycles every 6 months  2. CT scan in September or October    Abi Blanca MD  Adult and Pediatric Rheumatology     [facility], [address] [place] [address], Phone [phone], Fax [phone]     Visiting  of Pediatrics    Department of Pediatrics, [facility] of [address] [place] [address], [address] [place] [address], Phone [phone], Fax [phone]    There are no Patient Instructions on file for this visit.     cc:  Anabella Hampton MD      Total time was 40 minutes, greater than 50% of which was spent in counseling and coordination of care. The diagnosis, treatment and various other items were discussed in detail: Test results, medication options, possible side effects, lifestyle changes.

## 2022-07-12 NOTE — PROGRESS NOTES
Chief Complaint   Patient presents with    Other     [address] [address]     1. Have you been to the ER, urgent care clinic since your last visit? Hospitalized since your last visit? No    2. Have you seen or consulted any other health care providers outside of the [address] [place] [address] since your last visit? Include any pap smears or colon screening.  No

## 2022-07-13 LAB
BASOPHILS # BLD: 0 K/UL (ref 0–0.1)
BASOPHILS NFR BLD: 1 % (ref 0–1)
DIFFERENTIAL METHOD BLD: NORMAL
EOSINOPHIL # BLD: 0.2 K/UL (ref 0–0.4)
EOSINOPHIL NFR BLD: 4 % (ref 0–7)
ERYTHROCYTE [DISTWIDTH] IN BLOOD BY AUTOMATED COUNT: 14.2 % (ref 11.5–14.5)
HCT VFR BLD AUTO: 41.1 % (ref 36.6–50.3)
HGB BLD-MCNC: 13.1 G/DL (ref 12.1–17)
IMM GRANULOCYTES # BLD AUTO: 0 K/UL (ref 0–0.04)
IMM GRANULOCYTES NFR BLD AUTO: 0 % (ref 0–0.5)
LYMPHOCYTES # BLD: 2.3 K/UL (ref 0.8–3.5)
LYMPHOCYTES NFR BLD: 36 % (ref 12–49)
MCH RBC QN AUTO: 30.9 PG (ref 26–34)
MCHC RBC AUTO-ENTMCNC: 31.9 G/DL (ref 30–36.5)
MCV RBC AUTO: 96.9 FL (ref 80–99)
MONOCYTES # BLD: 0.8 K/UL (ref 0–1)
MONOCYTES NFR BLD: 13 % (ref 5–13)
NEUTS SEG # BLD: 3.1 K/UL (ref 1.8–8)
NEUTS SEG NFR BLD: 46 % (ref 32–75)
NRBC # BLD: 0 K/UL (ref 0–0.01)
NRBC BLD-RTO: 0 PER 100 WBC
PLATELET # BLD AUTO: 293 K/UL (ref 150–400)
PMV BLD AUTO: 10.2 FL (ref 8.9–12.9)
RBC # BLD AUTO: 4.24 M/UL (ref 4.1–5.7)
WBC # BLD AUTO: 6.5 K/UL (ref 4.1–11.1)

## 2022-07-15 NOTE — PROGRESS NOTES
I do hereby attest that this information is true, accurate and complete to the best of my knowledge. I was available for questions and evaluation if needed. Abi Pearson MD  Adult and Pediatric Rheumatology

## 2022-07-19 ENCOUNTER — TELEPHONE (OUTPATIENT)
Dept: RHEUMATOLOGY | Age: 72
End: 2022-07-19

## 2022-07-19 NOTE — TELEPHONE ENCOUNTER
Pt & wife called to request Dr. Annette Crisostomo or his nurse call [facility] for assisting patient to get scheduled for CT. Call [phone] for further instructions. Per pt & wife, the radiology dept would not accept paper order that was printed and given to them last week. [facility] requested our office to call them directly to get order into their system.      Please notify patient when contact has been made with hospital per family request.

## 2022-07-20 NOTE — TELEPHONE ENCOUNTER
Spoke to Radiology Scheduling dept at [facility], informed the  that Dr Rousseau Staff was not able to schedule the order in their system, the  stated that the order can be faxed over I verbally acknowledged understanding and stated I will fax over the order

## 2022-07-20 NOTE — TELEPHONE ENCOUNTER
Order faxed over to [facility] per hospitals request.  Called pt to notify that they should be good to go for scheduling CT.

## 2022-07-20 NOTE — TELEPHONE ENCOUNTER
Pt called stating the order was not received. Pt provided phone and fax number for Radiology.      Phone [phone]  Rfax [phone]

## 2022-07-25 NOTE — PROGRESS NOTES
[facility] Rheumatology  OBIC Note    Date: 2022  Name: Amelia Woo  MRN: [id number]       : 1950  Diagnosis: IgG4-related Sclerosing Cholangitis  Treatment: [id number] Day 13  Referring Provider: Dr. Omar Walters  Supervising Provider: Dr. Omar Walters    Patient arrived to [facility] at [address] . Mr. Jeana Frankel allergies reviewed and he agreed to receiving today's treatment. A physical assessment was performed initially and post-treatment. Pt. denies new complaints today. Pt reports the day after infusion two weeks ago, pt reported SOB while walking & dizziness that subsided without intervention the following day. He took his BP while feeling dizzy and his SBP was 98. When RN spoke with Dr. Omar Walters about pts symptoms, he reiterated that it is common for patient on this medication to feel those same symptoms the few days following treatment and encouraged to decrease activity for few days to allow body to recover. CT abdomen scheduled for  at [facility].      Mr. Gill vitals were monitored before, during and after medication administration. Vitals:    22 1025 22 1055 22 1151 22 1231   BP: 133/78 122/77 120/77 120/77   Pulse: (!) 55 (!) 54 (!) 56 65   Resp: 14 14 14 16   Temp:       SpO2: 98% 99% 98% 99%      Labs drawn during Day 1 infusion.  Not needed today    Medications given per providers orders:  Administrations This Visit       0.9% sodium chloride infusion       Admin Date  2022 Action  New Bag Dose  25 mL/hr Rate  25 mL/hr Route  IntraVENous Administered By  Dayana Milligan RN              acetaminophen (TYLENOL) tablet 650 mg       Admin Date  2022 Action  Given Dose  650 mg Route  Oral Administered By  Dayana Milligan RN              diphenhydrAMINE (BENADRYL) injection 50 mg       Admin Date  2022 Action  Given Dose  50 mg Route  IntraVENous Administered By  Dayana Milligan RN              methylPREDNISolone (PF) (Solu-MEDROL) injection 125 mg Admin Date  07/26/2022 Action  Given Dose  125 mg Route  IntraVENous Administered By  Matthew Gomez RN              riTUXimab-pvvr ([place]) 1,000 mg in 0.9% sodium chloride 250 mL infusion       Admin Date  07/26/2022 Action  New Bag Dose  1,000 mg Rate  25 mL/hr Route  IntraVENous Administered By  Matthew Gomez RN              sodium chloride (NS) flush 10 mL       Admin Date  07/26/2022 Action  Given Dose  10 mL Route  IntraVENous Administered By  Matthew Gomez RN                Acetaminophen 325mg tablets x 2 (650mg total)  NDC 4746-1426-04  Lot # [id number]  Exp 12/2024     Benadryl 50mg IVP   NDC 29475-568-20  Lot # [id number]  Exp 01/2024     125mg Solumedrol IVP  NDC 9334-6985-92  Lot # [id number]  Exp 08/2024     500ml bag 0.9% Normal Saline @ 25ml/hr  NDC 1232-5511-40  Lot # [id number]  Exp 12/2024    0.9% Normal Saline flush   NDC 5529-4690-76  Lot [id number]  Exp 04/2024     Ruxience 1000mg  NDC 0989-7776-56  Lot # [id number]  Exp 05/2023  ---mixed in---  250ml bag 0.9% Normal Saline  NDC 7789-3489-96  Lot # [id number]  Exp  1/31/2023     START: 25ml/hr @ 2762  50ml/hr @ 9098  75ml/hr @ 6120   100ml/hr @ 1490  STOP: 1230     Lines: LEFT FA. Blood return noted and IV site assessed before, during, and after treatment. Line flushed with 10-30 ml's 0.9% Normal Saline solution per protocol. Access was removed from Mr. Perla Jha after completion of infusion/injection. Mr. Perla Jha tolerated the treatment without complaints and no medication reactions were seen while in presence of this RN. Patient provided with AVS, which included future appointments and written educational material regarding Ruxience. All of the patients questions were answered and then discharged ambulatory from Rheumatology [facility] in stable condition at 1245. Encounter routed to supervising provider for co-signing.      Future Appointments   Date Time Provider Marina Warner   10/5/2022  9:00 AM Ronda Watts [facility]   1/11/2023  9:00 AM [facility] BS AMB   1/25/2023  9:00 AM Mayra Beck MD [facility] BS AMB   1/25/2023  9:00 AM INFUS[facility]INJ[facility] BS AMB     Jessica Rae RN  July 26, 2022

## 2022-07-26 ENCOUNTER — OFFICE VISIT (OUTPATIENT)
Dept: RHEUMATOLOGY | Age: 72
End: 2022-07-26
Payer: COMMERCIAL

## 2022-07-26 VITALS
DIASTOLIC BLOOD PRESSURE: 77 MMHG | RESPIRATION RATE: 16 BRPM | TEMPERATURE: 98 F | SYSTOLIC BLOOD PRESSURE: 120 MMHG | HEART RATE: 65 BPM | OXYGEN SATURATION: 99 %

## 2022-07-26 DIAGNOSIS — N13.5 RETROPERITONEAL FIBROSIS: ICD-10-CM

## 2022-07-26 DIAGNOSIS — D89.89 IGG4 RELATED DISEASE (HCC): ICD-10-CM

## 2022-07-26 DIAGNOSIS — K83.09 IGG4-RELATED SCLEROSING CHOLANGITIS: Primary | ICD-10-CM

## 2022-07-26 PROCEDURE — 96413 CHEMO IV INFUSION 1 HR: CPT

## 2022-07-26 PROCEDURE — 96375 TX/PRO/DX INJ NEW DRUG ADDON: CPT

## 2022-07-26 PROCEDURE — 96415 CHEMO IV INFUSION ADDL HR: CPT

## 2022-07-26 PROCEDURE — 96374 THER/PROPH/DIAG INJ IV PUSH: CPT

## 2022-07-26 RX ORDER — DIPHENHYDRAMINE HYDROCHLORIDE 50 MG/ML
25 INJECTION, SOLUTION INTRAMUSCULAR; INTRAVENOUS AS NEEDED
Start: 2022-12-27

## 2022-07-26 RX ORDER — DIPHENHYDRAMINE HYDROCHLORIDE 50 MG/ML
50 INJECTION, SOLUTION INTRAMUSCULAR; INTRAVENOUS ONCE
Status: COMPLETED | OUTPATIENT
Start: 2022-07-26 | End: 2022-07-26

## 2022-07-26 RX ORDER — ONDANSETRON 2 MG/ML
8 INJECTION INTRAMUSCULAR; INTRAVENOUS AS NEEDED
OUTPATIENT
Start: 2022-12-27

## 2022-07-26 RX ORDER — ALBUTEROL SULFATE 0.83 MG/ML
2.5 SOLUTION RESPIRATORY (INHALATION) AS NEEDED
Start: 2022-12-27

## 2022-07-26 RX ORDER — SODIUM CHLORIDE 9 MG/ML
25 INJECTION, SOLUTION INTRAVENOUS CONTINUOUS
OUTPATIENT
Start: 2022-12-27

## 2022-07-26 RX ORDER — SODIUM CHLORIDE 9 MG/ML
10 INJECTION INTRAMUSCULAR; INTRAVENOUS; SUBCUTANEOUS AS NEEDED
OUTPATIENT
Start: 2022-12-27

## 2022-07-26 RX ORDER — ACETAMINOPHEN 325 MG/1
650 TABLET ORAL ONCE
OUTPATIENT
Start: 2022-12-27 | End: 2022-12-27

## 2022-07-26 RX ORDER — DIPHENHYDRAMINE HYDROCHLORIDE 50 MG/ML
50 INJECTION, SOLUTION INTRAMUSCULAR; INTRAVENOUS AS NEEDED
Start: 2022-12-27

## 2022-07-26 RX ORDER — SODIUM CHLORIDE 0.9 % (FLUSH) 0.9 %
10 SYRINGE (ML) INJECTION AS NEEDED
OUTPATIENT
Start: 2022-12-27

## 2022-07-26 RX ORDER — ACETAMINOPHEN 325 MG/1
650 TABLET ORAL ONCE
Status: COMPLETED | OUTPATIENT
Start: 2022-07-26 | End: 2022-07-26

## 2022-07-26 RX ORDER — SODIUM CHLORIDE 9 MG/ML
25 INJECTION, SOLUTION INTRAVENOUS CONTINUOUS
Status: DISCONTINUED | OUTPATIENT
Start: 2022-07-26 | End: 2022-07-26 | Stop reason: HOSPADM

## 2022-07-26 RX ORDER — HYDROCORTISONE SODIUM SUCCINATE 100 MG/2ML
100 INJECTION, POWDER, FOR SOLUTION INTRAMUSCULAR; INTRAVENOUS AS NEEDED
OUTPATIENT
Start: 2022-12-27

## 2022-07-26 RX ORDER — EPINEPHRINE 1 MG/ML
0.3 INJECTION, SOLUTION, CONCENTRATE INTRAVENOUS AS NEEDED
OUTPATIENT
Start: 2022-12-27

## 2022-07-26 RX ORDER — DIPHENHYDRAMINE HYDROCHLORIDE 50 MG/ML
50 INJECTION, SOLUTION INTRAMUSCULAR; INTRAVENOUS ONCE
OUTPATIENT
Start: 2022-12-27 | End: 2022-12-27

## 2022-07-26 RX ORDER — ACETAMINOPHEN 325 MG/1
650 TABLET ORAL AS NEEDED
Start: 2022-12-27

## 2022-07-26 RX ORDER — HEPARIN 100 UNIT/ML
300-500 SYRINGE INTRAVENOUS AS NEEDED
Start: 2022-12-27

## 2022-07-26 RX ORDER — SODIUM CHLORIDE 0.9 % (FLUSH) 0.9 %
10 SYRINGE (ML) INJECTION AS NEEDED
Status: DISCONTINUED | OUTPATIENT
Start: 2022-07-26 | End: 2022-07-26 | Stop reason: HOSPADM

## 2022-07-26 RX ADMIN — ACETAMINOPHEN 650 MG: 325 TABLET ORAL at 08:50

## 2022-07-26 RX ADMIN — SODIUM CHLORIDE 25 ML/HR: 9 INJECTION, SOLUTION INTRAVENOUS at 09:09

## 2022-07-26 RX ADMIN — Medication 10 ML: at 09:09

## 2022-07-26 RX ADMIN — DIPHENHYDRAMINE HYDROCHLORIDE 50 MG: 50 INJECTION, SOLUTION INTRAMUSCULAR; INTRAVENOUS at 09:09

## 2022-07-26 NOTE — PATIENT INSTRUCTIONS
Dr. Janeen Parra stated that it is normal to feel dizzy & short of breath the following day or two after infusion. He recommends resting for a few days before resuming normal activities. Remember to complete your CT scan in September     Let us know if you need anything in the mean time before your next infusion/appt with Dr. Janeen Parra. I printed [employer] instructions to activate your account. You can see your results & notes from providers (including infusion notes) from your office visits. You can also send messages so you don't need to call and wait on hold.  Call me if you need help setting it up

## 2022-08-02 NOTE — PROGRESS NOTES
I do hereby attest that this information is true, accurate and complete to the best of my knowledge. I was available for questions and evaluation if needed. Abi Amaral MD  Adult and Pediatric Rheumatology

## 2022-10-05 ENCOUNTER — OFFICE VISIT (OUTPATIENT)
Dept: HEMATOLOGY | Age: 72
End: 2022-10-05
Payer: COMMERCIAL

## 2022-10-05 VITALS
TEMPERATURE: 97 F | HEART RATE: 57 BPM | HEIGHT: 61 IN | WEIGHT: 132 LBS | DIASTOLIC BLOOD PRESSURE: 76 MMHG | BODY MASS INDEX: 24.92 KG/M2 | OXYGEN SATURATION: 99 % | SYSTOLIC BLOOD PRESSURE: 124 MMHG

## 2022-10-05 DIAGNOSIS — D89.89 IGG4 RELATED DISEASE (HCC): Primary | ICD-10-CM

## 2022-10-05 PROCEDURE — 91200 LIVER ELASTOGRAPHY: CPT | Performed by: PHYSICIAN ASSISTANT

## 2022-10-05 PROCEDURE — 99214 OFFICE O/P EST MOD 30 MIN: CPT | Performed by: PHYSICIAN ASSISTANT

## 2022-10-05 PROCEDURE — 1123F ACP DISCUSS/DSCN MKR DOCD: CPT | Performed by: PHYSICIAN ASSISTANT

## 2022-10-05 NOTE — PROGRESS NOTES
[address] [facility], MD, [place]      MATTY Drew, [facility]-BC   Hamlet David, [facility]   Bradley Sim, FNDARLIN Pastor FNP-C    Mo Mattson, [facility]       Leroy Paige Arturo [address]    at [address], [facility] Dre Sellers  22. 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  ICD-9-CM: 576.1  7/1/2021        IgG4 related disease ([facility] ICD-10-CM: S20.59  ICD-9-CM: 279.8  7/1/2021        Retroperitoneal fibrosis ICD-10-CM: N13.5  ICD-9-CM: 593.4  7/1/2021        Autoimmune hepatitis ([facility] ICD-10-CM: K75.4  ICD-9-CM: 571.42  9/4/2014    Overview Signed 9/4/2014 10:29 AM by Pushpa Stevenson MD     Autoimmune etiology             Autoimmune sclerosing pancreatitis [facility]) ICD-10-CM: K86.1  ICD-9-CM: 577.8  6/3/2014           Alphonse De La Rosa returns to the [facility] for management of IGG4 autoimmune cholangitis. The active problem list, all pertinent past medical history, medications, endoscopic studies, radiologic findings and laboratory findings related to the liver disorder were reviewed with the patient. The patient speaks limited [place].   The patient chose to use language translation by a family member who was with the patient during this appointment. The patient is a 71 y.o.  male who developed acute pancreatitis and was noted to have elevated liver enzymes in 2013. Imaging demonstrated enlargement of the pancreas and EUS guided biopsy was consistent with autoimmune pancreatitis. A Liver biopsy in 2013 was consistent with autoimmune hepatitis and stage 3 fibrosis. He has been maintained on tacrolimus 1 mg daily at the time of diagnosis in 2016. This was generally well tolerated. There were a number of alternative immunosuppressants that had been used with significant side effects prior to this medication. The liver transaminases have remained normal and there has been no recurrence of pancreatitis. 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 This is associated with IGG4 autoimmune pancreatitis and autoimmune cholangitis. He is now being seen by Dr Jim Lipscomb (originally [facility]) who started Rituximab for autoimmune retroperitoneal fibrosis and has stopped tacrolimus (9/2021). He has had infusions every 6 months thus far and is tolerating this well, last 7/2022. Liver enzymes have not elevated with elimination of tacrolimus. The patient does not have any symptoms which could be attributed to the liver disorder. The patient is not experiencing the following symptoms which are commonly seen in this liver disorder:   fatigue, pain in the right side over the liver, diffuse abdominal pain arthralgias, myalgias. The patient completes all daily activities without any functional limitations. He has CT scan next week for monitoring response to rituximab infusions for Dr Jim Lipscomb. ASSESSMENT AND PLAN:  Autoimmune Hepatitis/cholangitis   The diagnosis was based upon serology, liver biopsy, other coexistant autoimmune disorders    A liver biopsy performed in 2013 shows moderate inflammation and Stage 3 bridging fibrosis. Fibroscan has been performed annually since 2016.   The most recent Fibroscan in 2021 demonstrated a liver stiffness of 5.4 consistent with stage 0-1 fibrosis. This was repeated this office visit and shows no increase in fibrosis scoring and no steatosis. The patient was initially treated with steroids for both autoimmune hepatitis and pancreatitis. He was tried on 6-MP but relapsed when steroids were tapered. He was intolerant to cellcept. He was well controlled on prednisone and Azathioprine but would relapse whenever the dose of steroids was lowered to under 20 mg every day. He was started on tacrolimus in 2016 and with this was able to taper and stop both prednsione and azathioprine without relapse of ether autoimmune hepatitis or pancreatitis. He was maintained on low dose TAC 1 mg every day from 2016 until 2021 at which time he was changed to rituximab due to recent recognition of retroperitoneal fibrosis which is associated with IgG4 autoimmne cholangitis and immune pancreatitis. He has seen and now co-managed by Kwesi Carroll and now) Dr Katiana Brown. Rituximab for retroperitoneal fibrosis and stopped tacrolimus in late Summer 2021. He has not had recent labs done and this will need to be repeated. I have drawn labs today and will forward to his medical team.     There is some discussion that the interval of the Rituxin infusions will be determined on the basis of hid CT results. I have asked that if the infusion is reduced in frequency to less than every 6 months, we be advised as we would likely repeat lab values more frequently in that setting. Serologic testing was negative for IgG4 and positive for APRIL, ASMA. The Fibroscan will continue to be repeated annually or as often as clinically indicated to assess for fibrosis progression and/or regression. Autoimmune pancreatitis  The patient developed acute pancreatitis without obvious etiology in 2013. The pancreas was diffusely enlarged consistent with autoimmune pancreatitis.   An EUS with biopsy of the pancreas demonstrated inflammation and was consistent with autoimmune pancreatitis. The patient was initially treated with steroids for both autoimmune hepatitis and pancreatitis in 2013. He recieved several different treatments for the 2 immune diseases as noted above. He was started on tacrolimus in 2016 and with this was able to taper and stop both prednsione and azathioprine without relapse of ether autoimmune hepatitis or pancreatitis. He is only being maintained on rituxan every 6 months at this point. 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 This was managed by Dr Kashif Glez. The CBD stricture was treated with stenting but did not resolve until a metal stent was placed in 4/2016. This was removed in 9/2018. Retroperitoneal fibrosis  The patient was found to have right hydronephrosis in 2/2021. This was treated with stenting of the ureter - this remains in place. There were enlarged nodes in the retroperitoneum. A biopsy of the enlarged nodes was negative for malignancy. A bone marrow biopsy was negative. He is scheduled to have CT scan next week for assessment of retroperitoneal fibrosis. Further imaging demonstrated uretal stcricture was due to retroperitoneal fibrosis. This is being treated by Dr Beto Chang with Rituximab. Treatment of other medical problems in patients with chronic liver disease  There are no contraindications for the patient to take most medications that are necessary for treatment of other medical issues. Normal doses of acetaminophen, as recommended on the label of the bottle, are not hepatotoxic except in the setting of daily alcohol use, even in patients with cirrhosis and can be utilized for pain. Counseling for alcohol in patients with chronic liver disease  The patient was counseled regarding alcohol consumption and the effect of alcohol on chronic liver disease.   The patient does not consume any significant amount of alcohol. Vaccinations   Vaccination for viral hepatitis B is not needed. The patient has serologic evidence of prior exposure or vaccination with immunity. The patient has received COVID-19 vaccine/boosters. Routine vaccinations against other bacterial and viral agents can be performed as indicated. Annual flu vaccination should be administered if indicated. ALLERGIES:  No Known Allergies    MEDICATIONS  Current Outpatient Medications   Medication Sig    omeprazole (PRILOSEC) 20 mg capsule Take 1 Capsule by mouth daily. carvediloL (COREG) 3.125 mg tablet Take  by mouth. tamsulosin (FLOMAX) 0.4 mg capsule Take  by mouth. Lactobacillus acidophilus (PROBIOTIC PO) Take  by mouth.    calcium-cholecalciferol, d3, 600-125 mg-unit tab Take  by mouth. Omega-3 Fatty Acids 300 mg cap Take  by mouth. GLUCOSAM/CHOND/HYALU/CF BORATE (MOVE FREE JOINT HEALTH PO) Take  by mouth. No current facility-administered medications for this visit. SYSTEM REVIEW NOT RELATED TO LIVER DISEASE OR REVIEWED ABOVE:  Constitution systems: Negative for fever, chills, weight gain, weight loss. Eyes: Negative for visual changes. ENT: Negative for sore throat, painful swallowing. Respiratory: Negative for cough, hemoptysis, SOB. Cardiology: Negative for chest pain, palpitations. GI:  Negative for constipation or diarrhea. Occasional heartburn symptoms. : Negative for urinary frequency, dysuria, hematuria, nocturia. Skin: Negative for rash. Hematology: Negative for easy bruising, blood clots. Musculo-skeletal: Negative for back pain, muscle pain, weakness. Neurologic: Negative for headaches, dizziness, vertigo, memory problems not related to HE. Psychology: Negative for anxiety, depression. FAMILY HISTORY:  The father  of colon cancer. The mother has the following chronic diseases: has an autoimmune disease.     There is no family history of liver disease. There is a family history of autoimmune diseases. SOCIAL HISTORY:  The patient is . The patient has 2 children. The patient has never used tobacco products. The patient has never consumed significant amounts of alcohol. The patient currently works full time in the [employer] in [place]. PHYSICAL EXAMINATION:  Visit Vitals  /76 (BP 1 Location: Right arm, BP Patient Position: Sitting, BP Cuff Size: Adult)   Pulse (!) 57   Temp 97 °F (36.1 °C) (Temporal)   Ht 5' 1\" (1.549 m)   Wt 132 lb (59.9 kg)   SpO2 99%   BMI 24.94 kg/m²     General: No acute distress. Eyes: Sclera anicteric. ENT: No oral lesions. Nodes: No adenopathy. Skin: No spider angiomata. No jaundice. No palmar erythema. Respiratory: Lungs clear to auscultation. Cardiovascular: Regular heart rate. No murmurs. No JVD. Abdomen: Soft non-tender. Liver size normal to percussion/palpation. Spleen not palpable. No obvious ascites. Extremities: No edema. No muscle wasting. No gross arthritic changes. Neurologic: Alert and oriented. Cranial nerves grossly intact. No asterixis.     LABORATORY STUDIES:  [facility] [place] of [address] [place] [address] 3/23/2022 1/11/2022   WBC 4.1 - 11.1 K/uL 6.4 5.9   ANC 1.8 - 8.0 K/UL 3.0 2.9   HGB 12.1 - 17.0 g/dL 13.0 12.6    - 400 K/uL 278 260   AST 15 - 37 U/L 19    ALT 12 - 78 U/L 22    Alk Phos 45 - 117 U/L 76    Bili, Total 0.2 - 1.0 MG/DL 0.6    Bili, Direct 0.0 - 0.2 MG/DL 0.2    Albumin 3.5 - 5.0 g/dL 4.1    BUN 6 - 20 MG/DL 26 (H)    Creat 0.70 - 1.30 MG/DL 1.16    Creat (iSTAT) 0.6 - 1.3 mg/dL 1.10    Na 136 - 145 mmol/L 139    K 3.5 - 5.1 mmol/L 4.6    Cl 97 - 108 mmol/L 109 (H)    CO2 21 - 32 mmol/L 28    Glucose 65 - 100 mg/dL 107 (H)      L[facility] [place] [facility] Latest Ref Rng & Units 9/21/2021   WBC 4.1 - 11.1 K/uL 5.1   ANC 1.8 - 8.0 K/UL 1.7 (L)   HGB 12.1 - 17.0 g/dL 12.6    - 400 K/uL 247   AST 15 - 37 U/L 24   ALT 12 - 78 U/L 25   Alk Phos 45 - 117 U/L 107   Bili, Total 0.2 - 1.0 MG/DL 0.4   Bili, Direct 0.0 - 0.2 MG/DL 0.1   Albumin 3.5 - 5.0 g/dL 4.4   BUN 6 - 20 MG/DL 24 (H)   Creat 0.70 - 1.30 MG/DL 1.14   Creat (iSTAT) 0.6 - 1.3 mg/dL    Na 136 - 145 mmol/L 141   K 3.5 - 5.1 mmol/L 4.4   Cl 97 - 108 mmol/L 112 (H)   CO2 21 - 32 mmol/L 24   Glucose 65 - 100 mg/dL 93   Additional lab values drawn at today's office visit are pending at the time of documentation. SEROLOGIES:  10/2013. HBsAntigen negative, anti-HBcore positive, anti-HBsurface positive, HBE antigen negative, Anti-HBE positive,HBV DNA undetectable, Ferritin 797. Serologies Latest Ref Rng 6/3/2014   APRIL, IFA  See patterns   APRIL Pattern  1:160 (H)   C-ANCA Neg:<1:20 titer <1:20   P-ANCA Neg:<1:20 titer <1:20   ANCA Neg:<1:20 titer <1:20   ASMCA 0 - 19 Units 25 (H)   M2 Ab 0.0 - 20.0 Units 3.8     Cancer Screening Latest Ref Rng & Units 10/11/2017 6/3/2014   CA 19-9 0 - 35 U/mL 8 69 (H)   CEA ng/mL 0.5    CEA 0.0 - 4.7 ng/mL  1.2     7/2013.  17. Serologies Latest Ref Rng & Units 4/13/2021   Hep B Surface Ag Index <0.10   Hep B Surface Ag Interp Negative   Negative   Hep B Core Ab, Total Negative   Negative   Hep B Surface Ab mIU/mL 118.90   Hep B Surface Ab Interp NR   REACTIVE (A)   Hep C Ab NONREACTIVE   NONREACTIVE       LIVER HISTOLOGY:  2013. Reviewed by MLS. Moderate inflammation with stage 3 fibrosis. 8/2016. FibroScan performed at [address]. EkPa was 7.4. Suggested fibrosis level is F1.    11/2017. FibroScan performed at [address]. EkPa was 8.1. Suggested fibrosis level is F1.    1/2019. FibroScan performed at [address]. EkPa was 6.3. Suggested fibrosis level is F1. CAP score is 330, suggestive of significant steatosis. 12/2019. FibroScan performed at [address]. EkPa was 6.6. Suggested fibrosis level is F0-1.  CAP score is 286, this is consistent with steatosis. 7/2021. [facility]. EkPa was 5.4. Suggested fibrosis level is F0-1. CAP score 181, steatosis. 10/2022. [facility] at [facility]. EkPa was 6.7. Suggested fibrosis level is F0-1. CAP score is 247, this is suggestive of       ENDOSCOPIC PROCEDURES:  5/2013. EGD by Dr Stapleton Patient. Gastritis and small gastric ulcer. 7/2013. EGD by Dr Stapleton Patient. Healed gastritis and .  12/2015. ERCP. Common bile duct stricture. Stent placed. 4/2016. ERCP by Dr Lindy Avilez. Pancreatic stricture. Covered metal stent placed. 11/2017. ERCP By Dr. Lindy Avilez. Bile duct stricture and stented. Bile duct stone-extracted. 5/2018. ERCP by Dr. Lindy Avilez. Bile duct stricture-stent changed. Abnormal mucosa ampulla-biopsied  9/2018. ERCP by Dr. Lindy Avilez. Bile duct stricture-resolved. Biliary sludge-extracted. RADIOLOGY:  10/2013. MRI of liver. Diffuse swelling of pancreas with peripancreatic edema. Encasement of CDB by edematous pancreas head. Normal appearing liver. No liver mass lesions. Normal spleen. No dilated bile ducts. No bile duct strictures. No ascites. 8/2014. MRI of liver. Short yet severe stricture of the common duct within the pancreatic head without associated pancreatic ductal or intrahepatic biliary dilatation. There is questionable subtle decreased contrast enhancement within the pancreatic head. ERCP with endoscopic ultrasound suggested for further evaluation. 11/2015. MRCP. Obstruction of CBD in region of pennie hepatitis. Distal CBD is normal. Proximal to obstruction the intrahepatic bile ducts are dilated. 6/2019. MRI performed in [facility]. Per patient no findings, possible pancreatic atrophy.  2/2021. CT abdomen. No liver lesion noted. Right hydronephrosis seen. 6/2021. CT abdomen. Infiltrative retroperitoneal mass, encasing the right common iliac artery and right ureter, demonstrates no significant change in size since the April 2021 examination. Moderately severe right-sided hydronephrosis and hydroureter; the right ureteral stent is in satisfactory position. 5 mm right lower lobe pleural-based pulmonary nodule demonstrates no significant change since 2021.  3/2022. CT abdomen. .Continued slight improvement in retroperitoneal inflammation. No liver mass/lesion. 10/2022. CT abdomen. Pending. OTHER TESTIN2021. Bone marrow biopsy negative for malignancy  2021. Retroperitoneal Lymph node biopsy negative for malignancy    FOLLOW-UP:  All of the issues listed above in the Assessment and Plan were discussed with the patient. All questions were answered. The patient expressed a clear understanding of the above. [facility] 87 in 6 months. May choose to repeat lab monitoring to quarterly if schedule of Rituxin is reduced for any reason. Will contact patient with today's labs and any directive for change in care plan.      Dwight Layton PA-C  [facility] [address] [facility] 22.  [phone]  [address] [place] [address]

## 2022-10-05 NOTE — PROGRESS NOTES
Identified pt with two pt identifiers(name and ). Reviewed record in preparation for visit and have obtained necessary documentation. Chief Complaint   Patient presents with    Hepatitis C     6month follow up with Jemima Osoriointer:    10/05/22 0847   BP: 124/76   Pulse: (!) 57   Temp: 97 °F (36.1 °C)   TempSrc: Temporal   SpO2: 99%   Weight: 132 lb (59.9 kg)   Height: 5' 1\" (1.549 m)   PainSc:   0 - No pain       Health Maintenance Review: Patient reminded of \"due or due soon\" health maintenance. I have asked the patient to contact his/her primary care provider (PCP) for follow-up on his/her health maintenance. Coordination of Care Questionnaire:  :   1) Have you been to an emergency room, urgent care, or hospitalized since your last visit? If yes, where when, and reason for visit? no       2. Have seen or consulted any other health care provider since your last visit? If yes, where when, and reason for visit? NO      Patient is accompanied by son I have received verbal consent from Yulia Muhammad to discuss any/all medical information while they are present in the room.

## 2022-10-06 LAB
ALBUMIN SERPL-MCNC: 3.9 G/DL (ref 3.5–5)
ALBUMIN/GLOB SERPL: 1.2 {RATIO} (ref 1.1–2.2)
ALP SERPL-CCNC: 90 U/L (ref 45–117)
ALT SERPL-CCNC: 35 U/L (ref 12–78)
ANION GAP SERPL CALC-SCNC: 5 MMOL/L (ref 5–15)
AST SERPL-CCNC: 24 U/L (ref 15–37)
BILIRUB DIRECT SERPL-MCNC: 0.2 MG/DL (ref 0–0.2)
BILIRUB SERPL-MCNC: 0.5 MG/DL (ref 0.2–1)
BUN SERPL-MCNC: 23 MG/DL (ref 6–20)
BUN/CREAT SERPL: 19 (ref 12–20)
CALCIUM SERPL-MCNC: 9 MG/DL (ref 8.5–10.1)
CHLORIDE SERPL-SCNC: 109 MMOL/L (ref 97–108)
CO2 SERPL-SCNC: 28 MMOL/L (ref 21–32)
CREAT SERPL-MCNC: 1.18 MG/DL (ref 0.7–1.3)
ERYTHROCYTE [DISTWIDTH] IN BLOOD BY AUTOMATED COUNT: 13.5 % (ref 11.5–14.5)
GLOBULIN SER CALC-MCNC: 3.2 G/DL (ref 2–4)
GLUCOSE SERPL-MCNC: 92 MG/DL (ref 65–100)
HCT VFR BLD AUTO: 41.2 % (ref 36.6–50.3)
HGB BLD-MCNC: 13.2 G/DL (ref 12.1–17)
MCH RBC QN AUTO: 30.1 PG (ref 26–34)
MCHC RBC AUTO-ENTMCNC: 32 G/DL (ref 30–36.5)
MCV RBC AUTO: 94.1 FL (ref 80–99)
NRBC # BLD: 0 K/UL (ref 0–0.01)
NRBC BLD-RTO: 0 PER 100 WBC
PLATELET # BLD AUTO: 245 K/UL (ref 150–400)
PMV BLD AUTO: 10.3 FL (ref 8.9–12.9)
POTASSIUM SERPL-SCNC: 4.8 MMOL/L (ref 3.5–5.1)
PROT SERPL-MCNC: 7.1 G/DL (ref 6.4–8.2)
RBC # BLD AUTO: 4.38 M/UL (ref 4.1–5.7)
SODIUM SERPL-SCNC: 142 MMOL/L (ref 136–145)
WBC # BLD AUTO: 5.7 K/UL (ref 4.1–11.1)

## 2022-10-06 NOTE — PROGRESS NOTES
Pt notified of continued stable liver enzymes, he has been off of tac for ~12 months and is getting Rituxin infusion every 6 months, will continue per rheumatology and follow-up in 6 months as scheduled.

## 2023-01-10 DIAGNOSIS — K83.09 IGG4-RELATED SCLEROSING CHOLANGITIS: Primary | ICD-10-CM

## 2023-01-10 DIAGNOSIS — N13.5 RETROPERITONEAL FIBROSIS: ICD-10-CM

## 2023-01-10 DIAGNOSIS — D89.89 IGG4 RELATED DISEASE (HCC): ICD-10-CM

## 2023-01-11 ENCOUNTER — OFFICE VISIT (OUTPATIENT)
Dept: RHEUMATOLOGY | Age: 73
End: 2023-01-11
Payer: COMMERCIAL

## 2023-01-11 ENCOUNTER — OFFICE VISIT (OUTPATIENT)
Dept: RHEUMATOLOGY | Age: 73
End: 2023-01-11

## 2023-01-11 VITALS
DIASTOLIC BLOOD PRESSURE: 84 MMHG | OXYGEN SATURATION: 99 % | TEMPERATURE: 98 F | HEART RATE: 73 BPM | RESPIRATION RATE: 18 BRPM | SYSTOLIC BLOOD PRESSURE: 135 MMHG

## 2023-01-11 DIAGNOSIS — N13.5 RETROPERITONEAL FIBROSIS: ICD-10-CM

## 2023-01-11 DIAGNOSIS — K83.09 IGG4-RELATED SCLEROSING CHOLANGITIS: Primary | ICD-10-CM

## 2023-01-11 DIAGNOSIS — D89.89 IGG4 RELATED DISEASE (HCC): ICD-10-CM

## 2023-01-11 PROCEDURE — 96374 THER/PROPH/DIAG INJ IV PUSH: CPT

## 2023-01-11 PROCEDURE — 96415 CHEMO IV INFUSION ADDL HR: CPT

## 2023-01-11 PROCEDURE — 96375 TX/PRO/DX INJ NEW DRUG ADDON: CPT

## 2023-01-11 PROCEDURE — 96413 CHEMO IV INFUSION 1 HR: CPT

## 2023-01-11 RX ORDER — ACETAMINOPHEN 325 MG/1
650 TABLET ORAL ONCE
OUTPATIENT
Start: 2023-01-22 | End: 2023-01-22

## 2023-01-11 RX ORDER — HEPARIN 100 UNIT/ML
300-500 SYRINGE INTRAVENOUS AS NEEDED
Start: 2023-01-22

## 2023-01-11 RX ORDER — EPINEPHRINE 1 MG/ML
0.3 INJECTION, SOLUTION, CONCENTRATE INTRAVENOUS AS NEEDED
OUTPATIENT
Start: 2023-01-22

## 2023-01-11 RX ORDER — ACETAMINOPHEN 325 MG/1
650 TABLET ORAL ONCE
Status: COMPLETED | OUTPATIENT
Start: 2023-01-11 | End: 2023-01-11

## 2023-01-11 RX ORDER — SODIUM CHLORIDE 9 MG/ML
10 INJECTION INTRAVENOUS AS NEEDED
OUTPATIENT
Start: 2023-01-22

## 2023-01-11 RX ORDER — DIPHENHYDRAMINE HYDROCHLORIDE 50 MG/ML
50 INJECTION, SOLUTION INTRAMUSCULAR; INTRAVENOUS AS NEEDED
Start: 2023-01-22

## 2023-01-11 RX ORDER — HYDROCORTISONE SODIUM SUCCINATE 100 MG/2ML
100 INJECTION, POWDER, FOR SOLUTION INTRAMUSCULAR; INTRAVENOUS AS NEEDED
OUTPATIENT
Start: 2023-01-22

## 2023-01-11 RX ORDER — ONDANSETRON 2 MG/ML
8 INJECTION INTRAMUSCULAR; INTRAVENOUS AS NEEDED
OUTPATIENT
Start: 2023-01-22

## 2023-01-11 RX ORDER — DIPHENHYDRAMINE HYDROCHLORIDE 50 MG/ML
50 INJECTION, SOLUTION INTRAMUSCULAR; INTRAVENOUS ONCE
OUTPATIENT
Start: 2023-01-22 | End: 2023-01-22

## 2023-01-11 RX ORDER — SODIUM CHLORIDE 9 MG/ML
25 INJECTION, SOLUTION INTRAVENOUS CONTINUOUS
Status: DISCONTINUED | OUTPATIENT
Start: 2023-01-11 | End: 2023-01-11 | Stop reason: HOSPADM

## 2023-01-11 RX ORDER — SODIUM CHLORIDE 0.9 % (FLUSH) 0.9 %
10 SYRINGE (ML) INJECTION AS NEEDED
Status: DISCONTINUED | OUTPATIENT
Start: 2023-01-11 | End: 2023-01-11 | Stop reason: HOSPADM

## 2023-01-11 RX ORDER — SODIUM CHLORIDE 9 MG/ML
25 INJECTION, SOLUTION INTRAVENOUS CONTINUOUS
OUTPATIENT
Start: 2023-01-22

## 2023-01-11 RX ORDER — ALBUTEROL SULFATE 0.83 MG/ML
2.5 SOLUTION RESPIRATORY (INHALATION) AS NEEDED
Start: 2023-01-22

## 2023-01-11 RX ORDER — SODIUM CHLORIDE 0.9 % (FLUSH) 0.9 %
10 SYRINGE (ML) INJECTION AS NEEDED
OUTPATIENT
Start: 2023-01-22

## 2023-01-11 RX ORDER — DIPHENHYDRAMINE HYDROCHLORIDE 50 MG/ML
50 INJECTION, SOLUTION INTRAMUSCULAR; INTRAVENOUS ONCE
Status: COMPLETED | OUTPATIENT
Start: 2023-01-11 | End: 2023-01-11

## 2023-01-11 RX ORDER — ACETAMINOPHEN 325 MG/1
650 TABLET ORAL AS NEEDED
Start: 2023-01-22

## 2023-01-11 RX ORDER — DIPHENHYDRAMINE HYDROCHLORIDE 50 MG/ML
25 INJECTION, SOLUTION INTRAMUSCULAR; INTRAVENOUS AS NEEDED
Start: 2023-01-22

## 2023-01-11 RX ADMIN — SODIUM CHLORIDE 25 ML/HR: 9 INJECTION, SOLUTION INTRAVENOUS at 08:44

## 2023-01-11 RX ADMIN — ACETAMINOPHEN 650 MG: 325 TABLET ORAL at 08:29

## 2023-01-11 RX ADMIN — Medication 10 ML: at 08:43

## 2023-01-11 RX ADMIN — DIPHENHYDRAMINE HYDROCHLORIDE 50 MG: 50 INJECTION, SOLUTION INTRAMUSCULAR; INTRAVENOUS at 08:43

## 2023-01-11 NOTE — PROGRESS NOTES
RHEUMATOLOGY PROBLEM LIST AND CHIEF COMPLAINT  1. IgG-4 retroperitoneal fibrosis  2. Sclerosing cholangitis     Immunosuppression Screening:  Quantiferon TB: negative  PPD:  Not performed  Hepatitis B: negative (4/13/2021)  Hepatitis C: negative (4/13/2021)    Therapy History includes:  Current DMARD therapy include: rituximab 1000 mg IV every 6 months (7/14/2021 to present)  Prior DMARD therapy include: None  Discontinued DMARDs because of inefficacy: None  Discontinued DMARDs because of side effects: None      INTERVAL HISTORY  This is a 71 y.o.  male. Today, the patient complains of no pain in the joints. Location: joints  Severity:  0 on a scale of 0-10  Timing: all day   Duration:  Since last visit  Modifying factors: rituixmab  Context/Associated signs and symptoms: The patient is here with his son who translates. He has been doing well. He continues with rituximab 1 g q6 months. Son notes that he was complaining of pain in his right leg. He has seen his PCP for this. He had a CT scan of his abdomen/pelvis on 10/2022 showing decrease in size of retroperitoneal fibrosis. His last visit with urology was in November 2022. He had his stent recently removed. He will follow up with urology again next week.      RHEUMATOLOGY REVIEW OF SYSTEMS   Positives as per history  Negatives as follows:  Sachin Rajan:  Denies unexplained persistent fevers, weight change, chronic fatigue  HEAD/EYES:   Denies eye redness, blurry vision or sudden loss of vision, dry eyes, HA, temporal artery pain  ENT:    Denies oral/nasal ulcers, recurrent sinus infections, dry mouth  RESPIRATORY:  No pleuritic pain, history of pleural effusions, hemoptysis, exertional dyspnea  CARDIOVASCULAR: Denies chest pain, history of pericardial effusions  GASTRO:   Denies heartburn, esophageal dysmotility, abdominal pain, nausea, vomiting, diarrhea, blood in the stool  HEMATOLOGIC:  No easy bruising, purpura, swollen lymph nodes  SKIN:    Denies alopecia, ulcers, nodules, sun sensitivity, unexplained persistent rash   VASCULAR:   Denies edema, cyanosis, raynaud phenomenon  NEUROLOGIC:  Denies specific muscle weakness, paresthesias   PSYCHIATRIC:  No sleep disturbance / snoring, depression, anxiety  MSK:    No morning stiffness >1 hour, SI joint pain, persistent joint swelling, persistent joint pain    PAST MEDICAL, SOCIAL AND FAMILY HISTORY  Reviewed with patient, significant changes in medical history - yes    PHYSICAL EXAM  Vitals reviewed   GENERAL APPEARANCE: Well-nourished, no acute distress  NECK: No adenopathy  ENT: No oral ulcers  CARDIOVASCULAR: Heart rhythm is regular. No murmur, rub, gallop  CHEST: Normal vesicular breath sounds. No wheezes, rales, pleural friction rubs  ABDOMINAL: The abdomen is soft and nontender. Bowel sounds are normal  SKIN: No rash, palpable purpura, digital ulcer, abnormal thickening   MUSCULOSKELETAL:   Upper extremities - full range of motion, no tenderness, no swelling, no synovial thickening and no deformity of joints  Lower extremities - full range of motion, no tenderness, no swelling, no synovial thickening and no deformity of joints except right lateral thigh pain    LABS, RADIOLOGY AND PROCEDURES  Previous labs reviewed -Yes  Previous radiology reviewed -Yes  Previous procedures reviewed -Yes  Previous medical records reviewed/summarized -Yes    CT Imaging    CT Abdomen and Pelvis with contrast 12/14/2021: Abdomen: A sub-6 mm, subpleural nodule in the right lower lobe requires no follow-up. The lung bases are otherwise clear. The heart is at the upper limits of normal in size. The coronary sinus is dilated (11 mm). The distal esophagus, stomach, duodenum, liver, gallbladder, pancreas, spleen, adrenals, and left kidney are normal. Retroperitoneum: Right hydronephrosis is mild to moderate, despite an appropriately positioned ureteral stent. Hydronephrosis is decreased from 6/9/2021, however.  Enhancement of the urothelium and inflammation of the renal sinus fat suggests pyelitis. No right renal perfusion defects to suggest pyelonephritis. There is mild right renal atrophy relative to the left kidney. The bilateral common iliac arteries are encased by abnormal soft tissue in the inferior retroperitoneum. This extends to the aortic bifurcation, and probably around the bilateral internal iliac arteries. This is the cause of right ureteral obstruction. On prior studies, the thickness of this rind of soft tissue was greater, more consistent with fibrosis than vasculitis. Today, it measures 6 mm in greatest thickness at the level of the right common iliac artery (3-43), versus 8 mm on 6/9/2021 and 10 mm on 3/31/2021 at similar levels. Pelvis: There is a large volume of stool throughout the colon. The small bowel, ileocecal junction, and bladder are normal. The appendix is not visualized; no pericecal inflammatory process. No free air or fluid, and no abdominopelvic lymphadenopathy. CT Abdomen and Pelvis with contrast 6/09/2021: LOWER THORAX: 5 mm pleural-based nodule in the right lower lobe, unchanged since the April 27, 2021 PET CT. LIVER: No mass. BILIARY TREE: Gallbladder is within normal limits. CBD is not dilated. SPLEEN: within normal limits. PANCREAS: No mass or ductal dilatation. ADRENALS: Unremarkable. KIDNEYS: Moderately severe right-sided hydronephrosis and hydroureter. Right-sided ureteral stent is in satisfactory position. STOMACH: Unremarkable. SMALL BOWEL: No dilatation or wall thickening. COLON: No dilatation or wall thickening. APPENDIX: Normal. PERITONEUM: No ascites or pneumoperitoneum. RETROPERITONEUM: Infiltrative retroperitoneal mass encases the right common iliac artery and right ureter; its largest component measures 2.8 x 2.6 cm, previously 2.7 x 2.7 cm. Subcentimeter retroperitoneal lymph nodes are not significantly changed. REPRODUCTIVE ORGANS: Unremarkable URINARY BLADDER: No mass or calculus. BONES: No destructive bone lesion. ABDOMINAL WALL: No mass or hernia. PET/CT Scan 4/27/2021: HEAD/NECK: There is a 1 cm supraclavicular lymph node with SUV of 3.2. CHEST: There are normal sized mediastinal and hilar lymph nodes with slight increased metabolic activity of 2.8. ABDOMEN/PELVIS: Right ureteral stent is noted in position. There is increased soft tissue density at the aortic bifurcation with slight increased metabolic activity of 4. SKELETON: No foci of abnormal hypermetabolism in the axial and visualized appendicular skeleton. CT Abdomen and Pelvis with contrast 10/15/2015: Visualized lower Chest: Lungs/Pleura: Within normal limits Heart/vessels: Normal. Abdomen and pelvis: Liver: Normal. Biliary: Post cholecystectomy. There is new intrahepatic severe biliary dilation with ducts converging at the level of the hilum where the lumen becomes completely collapse. No clearly evident mass is identified however there is suggestion of some enhancement of the duct because of the common hepatic duct just as it exits the area of collapse. Distally the common duct distends to approximately 9 mm to 10 taper within the pancreatic head at the site of previous stricture identified on MRI within the pancreatic head. Spleen: Normal. Pancreas: Normal.  No duct dilation. Adrenals: Normal. Urinary: Normal appearance to kidneys, ureters, and bladder. Peritoneum/Mesenteries: Normal. Gastrointestinal tract: Normal.  No dilation or inflammatory changes. Vascular: Atherosclerotic calcination is without aneurysm. Reproductive System: Seminal vesicles and prostate appear unremarkable. MSK: Spondylosis with no aggressive lesions of bone. MR Imaging    MRI Abdomen with and without contrast 11/12/2015: Liver:  No steatosis. Interval development of diffuse intrahepatic biliary ductal dilatation related to central obstruction. Subtle mild T2 hyperintensity is seen centrally near the level of obstruction.  In addition, there is ill-defined masslike enhancement centrally best seen on series 9 image 39 measuring approximately 3.3 x 4.0 cm. There is nodular soft tissue thickening and enhancement extending along the common hepatic duct into the common bile duct. Gallbladder:  Mildly thickwalled, but underdistended gallbladder. No filling defect. Spleen, pancreas, and adrenal glands:   Normal in signal. No focal abnormality. Kidneys:  Symmetric enhancement. No hydronephrosis. MRCP:  Severe, diffuse intrahepatic biliary ductal dilatation related to complete central common hepatic/common bile duct obstruction near the pennie hepatis. The common bile duct is patent the distal to the level of  obstruction all the way to the ampulla with no filling defect or stricture. Normal course and caliber of the pancreatic duct. Other findings:  No ascites. No lymphadenopathy. MRI/MRCP Abdomen with and without contrast 8/09/20214: The common bile is at upper limits of normal size at 6 mm. There is a short segment stricture in the region of the pancreatic head. There is no associated biliary ductal dilatation. Although there is no definite pancreatic head mass, there may be very subtle lower signal intensity within the pancreatic head on postcontrast imaging. This is a questionable finding. There is no intrahepatic biliary dilatation. No focal liver lesion. Liver spleen and kidneys are normal. No adenopathy. PATHOLOGY    Lymph node, right supraclavicular, core biopsy with touch preparation 5/21/2021: Lymphoid tissue with no definitive morphologic, immunohistochemical, or flow cytometric evidence of a lymphoproliferative disorder. No cells diagnostic for malignancy. Bone marrow, posterior iliac crest, aspirate, clot section, and decalcified core biopsy: Normocellular marrow with maturing trilineage hematopoiesis. No morphologic or immunophenotypic evidence of a lymphoproliferative disorder.  Flow cytometry shows no diagnostic immunophenotypic abnormalities. Ampulla of Vater, biopsy 5/11/2018: Small bowel and ampullary mucosa with chronic active inflammation. No malignancy identified       Common bile duct biopsy 1/03/2018: Scattered detached ductal epithelial cells with reactive type changes with background fibrin and blood. Ampullary biopsy 1/03/2018: Benign ductal and small intestinal epithelium and stroma with reactive changes. Bile duct, biopsy 10/11/2017: Predominantly unremarkable small intestinal mucosa with preserved villous architecture. Scant fragment of benign bile duct mucosa with chronic inflammation     Bile duct, biopsy 4/27/2016: Benign bile duct epithelium with acute and chronic inflammation and reactive changes. Abundant granulation tissue. Negative for dysplasia or malignancy     ASSESSMENT   1.  IgG4 related sclerosing cholangitis with retroperitoneal fibrosis (Established problem -  Stable disease) -this is currently stable, with good response to rituximab infusions. I recommend he repeat a CT scan 6 months from the most previous CT scan. I suspect his leg pain is from trochanteric bursitis. 2. Trochanteric bursitis -  The pain and discomfort over the lateral thigh is most likely coming from inflammation of the bursa. There is pain on the outside of the hip and thigh, pain with lying on the affected side, pain with pressure on the lateral thigh, pain with activity and pain with walking up stairs. These are all classic for this diagnosis. We reviewed rehabilitation exercises with the patient (piriformas stretch, iliotibial band stretch, straight leg stretch, wall squat with ball and gluteal strengthening). There is also relief from bursa injection with corticosteroids and lidocaine. PLAN  1. Continue rituximab 1 g every 14 days with cycles every 6 months  2. CT scan in April 2023    Mario Oswald MD  Adult and Pediatric Rheumatology     [facility], [address], [address] [facility], Phone [phone], Fax [phone]    Visiting  of Pediatrics    Department of Pediatrics, [facility], [address] [place] [address], Phone [phone], Fax [phone]    There are no Patient Instructions on file for this visit. cc:  MD Dr. Noemy Jonas - urology    Juan Alberto Coto MD, personally performed the services described in the documentation as scribed by Price Ma in my presence and have reviewed and agree with the note as scribed.

## 2023-01-11 NOTE — PROGRESS NOTES
[facility] Rheumatology  OBIC Note    Date: 2023  Name: Priti Gruber  MRN: [id number]       : 1950  Diagnosis: IgG4-related Sclerosing Cholangitis (K83.09)  Treatment: Ruxience 1000mg Day 1  Referring Provider: Dr. Emory Grullon  Supervising Provider: Dr. Emory Grullon    Patient arrived to [facility] at 0830. Mr. Shay Jackson allergies reviewed and he agreed to receiving today's treatment. A physical assessment was performed initially and post-treatment. Pt. denies new complaints today. Mr. Dahlia Cole vitals were monitored before, during and after medication administration.    Vitals:    23 0950 23 1020 23 1108 23 1201   BP: 133/81 (!) 147/81 131/82 135/84   Pulse: 60 64 73 73   Resp: 16 16 16 18   Temp:       SpO2: 99% 99% 99% 99%      CBC/CMP drawn and walked to [facility]    Medications given per providers orders:  Administrations This Visit       0.9% sodium chloride infusion       Admin Date  2023 Action  New Bag Dose  25 mL/hr Rate  25 mL/hr Route  IntraVENous Administered By  Zayra Tyler RN              acetaminophen (TYLENOL) tablet 650 mg       Admin Date  2023 Action  Given Dose  650 mg Route  Oral Administered By  Zayra Tyler RN              diphenhydrAMINE (BENADRYL) injection 50 mg       Admin Date  2023 Action  Given Dose  50 mg Route  IntraVENous Administered By  Zayra Tyler RN              methylPREDNISolone (PF) (Solu-MEDROL) injection 125 mg       Admin Date  2023 Action  Given Dose  125 mg Route  IntraVENous Administered By  Zayra Tyler RN              riTUXimab-pvvr ([facility]) 1,000 mg in 0.9% sodium chloride 250 mL infusion       Admin Date  2023 Action  New Bag Dose  1,000 mg Route  IntraVENous Administered By  Zayra Tyler RN              sodium chloride (NS) flush 10 mL       Admin Date  2023 Action  Given Dose  10 mL Route  IntraVENous Administered By  Zayra Tyler RN                Acetaminophen 325mg tablets x 2 (650mg total)  [address] 7223-3596-54  Lot # [id number]  Exp 07/2025    Benadryl 50mg IVP   [address]  86675-883-39  Lot# [id number]  Exp 07/2024    125mg Solumedrol IVP  NDC 6795-2734-19  Lot # [id number]  Exp 06/2023    250 ml bag 0.9% Normal Saline @ 25ml/hr  NDC 6715-7739-23  Lot # [id number]  Exp 12/2023    0.9% Normal Saline 10ml flush  NDC 2698206050  Lot # [id number]  Exp 04/30/2025    Ruxience 1000mg  NDC 4281-2658-10  Lot # [id number]  Exp 02/29/24  ---mixed in---  250ml bag 0.9% Normal Saline  NDC 1355-4520-31  Lot # [id number]  Exp  2/28/24     START: 25ml/hr @ 9376  50ml/hr @ 3257  75ml/hr @ 8944  100ml/hr @ 4259  STOP: 1200    Lines: LEFT outer upper AC. [facility] return noted and IV site assessed before, during, and after treatment. Line flushed with 10-30 ml's 0.9% Normal Saline solution per protocol. Access was removed from Mr. Parker Moon after completion of infusion/injection. Mr. Parker Moon tolerated the treatment without complaints and no medication reactions were seen while in presence of this RN. Patient provided with AVS, which included future appointments and written educational material regarding Ruxience. All of the patients questions were answered and then discharged ambulatory from Rheumatology OBIC in stable condition at 1225. Encounter routed to supervising provider for co-signing. Future Appointments   Date Time Provider Marina Warner   1/25/2023  9:00 AM Johanne Acosta MD [employer] BS AMB   1/25/2023  9:00 AM INF[facility]INJ[facility] BS AMB   4/12/2023  8:20 AM DONALD Mejias BS AMB     Rosa M Mancilla RN  January 11, 2023    I do hereby attest that this information is true, accurate and complete to the best of my knowledge. I was available for questions and evaluation if needed. Abi Galvez MD  Adult and Pediatric Rheumatology

## 2023-01-12 LAB
ALBUMIN SERPL-MCNC: 3.9 G/DL (ref 3.5–5)
ALBUMIN/GLOB SERPL: 1.1 (ref 1.1–2.2)
ALP SERPL-CCNC: 85 U/L (ref 45–117)
ALT SERPL-CCNC: 26 U/L (ref 12–78)
ANION GAP SERPL CALC-SCNC: 5 MMOL/L (ref 5–15)
AST SERPL-CCNC: 19 U/L (ref 15–37)
BASOPHILS # BLD: 0.1 K/UL (ref 0–0.1)
BASOPHILS NFR BLD: 1 % (ref 0–1)
BILIRUB SERPL-MCNC: 0.3 MG/DL (ref 0.2–1)
BUN SERPL-MCNC: 27 MG/DL (ref 6–20)
BUN/CREAT SERPL: 21 (ref 12–20)
CALCIUM SERPL-MCNC: 9.2 MG/DL (ref 8.5–10.1)
CHLORIDE SERPL-SCNC: 109 MMOL/L (ref 97–108)
CO2 SERPL-SCNC: 25 MMOL/L (ref 21–32)
CREAT SERPL-MCNC: 1.29 MG/DL (ref 0.7–1.3)
DIFFERENTIAL METHOD BLD: NORMAL
EOSINOPHIL # BLD: 0.1 K/UL (ref 0–0.4)
EOSINOPHIL NFR BLD: 2 % (ref 0–7)
ERYTHROCYTE [DISTWIDTH] IN BLOOD BY AUTOMATED COUNT: 13.7 % (ref 11.5–14.5)
GLOBULIN SER CALC-MCNC: 3.4 G/DL (ref 2–4)
GLUCOSE SERPL-MCNC: 113 MG/DL (ref 65–100)
HCT VFR BLD AUTO: 43.6 % (ref 36.6–50.3)
HGB BLD-MCNC: 13.6 G/DL (ref 12.1–17)
IMM GRANULOCYTES # BLD AUTO: 0 K/UL (ref 0–0.04)
IMM GRANULOCYTES NFR BLD AUTO: 0 % (ref 0–0.5)
LYMPHOCYTES # BLD: 2.6 K/UL (ref 0.8–3.5)
LYMPHOCYTES NFR BLD: 40 % (ref 12–49)
MCH RBC QN AUTO: 30.1 PG (ref 26–34)
MCHC RBC AUTO-ENTMCNC: 31.2 G/DL (ref 30–36.5)
MCV RBC AUTO: 96.5 FL (ref 80–99)
MONOCYTES # BLD: 0.9 K/UL (ref 0–1)
MONOCYTES NFR BLD: 13 % (ref 5–13)
NEUTS SEG # BLD: 2.9 K/UL (ref 1.8–8)
NEUTS SEG NFR BLD: 44 % (ref 32–75)
NRBC # BLD: 0 K/UL (ref 0–0.01)
NRBC BLD-RTO: 0 PER 100 WBC
PLATELET # BLD AUTO: 303 K/UL (ref 150–400)
PMV BLD AUTO: 10 FL (ref 8.9–12.9)
POTASSIUM SERPL-SCNC: 4.6 MMOL/L (ref 3.5–5.1)
PROT SERPL-MCNC: 7.3 G/DL (ref 6.4–8.2)
RBC # BLD AUTO: 4.52 M/UL (ref 4.1–5.7)
SODIUM SERPL-SCNC: 139 MMOL/L (ref 136–145)
WBC # BLD AUTO: 6.5 K/UL (ref 4.1–11.1)

## 2023-01-25 ENCOUNTER — OFFICE VISIT (OUTPATIENT)
Dept: RHEUMATOLOGY | Age: 73
End: 2023-01-25
Payer: COMMERCIAL

## 2023-01-25 VITALS
HEART RATE: 57 BPM | DIASTOLIC BLOOD PRESSURE: 75 MMHG | OXYGEN SATURATION: 98 % | RESPIRATION RATE: 14 BRPM | TEMPERATURE: 98 F | SYSTOLIC BLOOD PRESSURE: 119 MMHG

## 2023-01-25 DIAGNOSIS — D89.89 IGG4 RELATED DISEASE (HCC): ICD-10-CM

## 2023-01-25 DIAGNOSIS — K83.09 IGG4-RELATED SCLEROSING CHOLANGITIS: Primary | ICD-10-CM

## 2023-01-25 DIAGNOSIS — N13.5 RETROPERITONEAL FIBROSIS: ICD-10-CM

## 2023-01-25 PROCEDURE — 96413 CHEMO IV INFUSION 1 HR: CPT

## 2023-01-25 PROCEDURE — 96374 THER/PROPH/DIAG INJ IV PUSH: CPT

## 2023-01-25 PROCEDURE — 96415 CHEMO IV INFUSION ADDL HR: CPT

## 2023-01-25 PROCEDURE — 96375 TX/PRO/DX INJ NEW DRUG ADDON: CPT

## 2023-01-25 RX ORDER — HEPARIN 100 UNIT/ML
300-500 SYRINGE INTRAVENOUS AS NEEDED
Start: 2023-06-28

## 2023-01-25 RX ORDER — SODIUM CHLORIDE 0.9 % (FLUSH) 0.9 %
10 SYRINGE (ML) INJECTION AS NEEDED
OUTPATIENT
Start: 2023-06-28

## 2023-01-25 RX ORDER — SODIUM CHLORIDE 9 MG/ML
25 INJECTION, SOLUTION INTRAVENOUS CONTINUOUS
OUTPATIENT
Start: 2023-06-28

## 2023-01-25 RX ORDER — DIPHENHYDRAMINE HYDROCHLORIDE 50 MG/ML
25 INJECTION, SOLUTION INTRAMUSCULAR; INTRAVENOUS AS NEEDED
Start: 2023-06-28

## 2023-01-25 RX ORDER — SODIUM CHLORIDE 9 MG/ML
10 INJECTION INTRAVENOUS AS NEEDED
OUTPATIENT
Start: 2023-06-28

## 2023-01-25 RX ORDER — ONDANSETRON 2 MG/ML
8 INJECTION INTRAMUSCULAR; INTRAVENOUS AS NEEDED
OUTPATIENT
Start: 2023-06-28

## 2023-01-25 RX ORDER — SODIUM CHLORIDE 9 MG/ML
25 INJECTION, SOLUTION INTRAVENOUS CONTINUOUS
Status: DISCONTINUED | OUTPATIENT
Start: 2023-01-25 | End: 2023-01-25 | Stop reason: HOSPADM

## 2023-01-25 RX ORDER — DIPHENHYDRAMINE HYDROCHLORIDE 50 MG/ML
50 INJECTION, SOLUTION INTRAMUSCULAR; INTRAVENOUS ONCE
OUTPATIENT
Start: 2023-06-28 | End: 2023-06-28

## 2023-01-25 RX ORDER — EPINEPHRINE 1 MG/ML
0.3 INJECTION, SOLUTION, CONCENTRATE INTRAVENOUS AS NEEDED
OUTPATIENT
Start: 2023-06-28

## 2023-01-25 RX ORDER — ALBUTEROL SULFATE 0.83 MG/ML
2.5 SOLUTION RESPIRATORY (INHALATION) AS NEEDED
Start: 2023-06-28

## 2023-01-25 RX ORDER — ACETAMINOPHEN 325 MG/1
650 TABLET ORAL ONCE
OUTPATIENT
Start: 2023-06-28 | End: 2023-06-28

## 2023-01-25 RX ORDER — ACETAMINOPHEN 325 MG/1
650 TABLET ORAL AS NEEDED
Start: 2023-06-28

## 2023-01-25 RX ORDER — SODIUM CHLORIDE 0.9 % (FLUSH) 0.9 %
10 SYRINGE (ML) INJECTION AS NEEDED
Status: DISCONTINUED | OUTPATIENT
Start: 2023-01-25 | End: 2023-01-25 | Stop reason: HOSPADM

## 2023-01-25 RX ORDER — DIPHENHYDRAMINE HYDROCHLORIDE 50 MG/ML
50 INJECTION, SOLUTION INTRAMUSCULAR; INTRAVENOUS ONCE
Status: COMPLETED | OUTPATIENT
Start: 2023-01-25 | End: 2023-01-25

## 2023-01-25 RX ORDER — DIPHENHYDRAMINE HYDROCHLORIDE 50 MG/ML
50 INJECTION, SOLUTION INTRAMUSCULAR; INTRAVENOUS AS NEEDED
Start: 2023-06-28

## 2023-01-25 RX ORDER — HYDROCORTISONE SODIUM SUCCINATE 100 MG/2ML
100 INJECTION, POWDER, FOR SOLUTION INTRAMUSCULAR; INTRAVENOUS AS NEEDED
OUTPATIENT
Start: 2023-06-28

## 2023-01-25 RX ORDER — ACETAMINOPHEN 325 MG/1
650 TABLET ORAL ONCE
Status: COMPLETED | OUTPATIENT
Start: 2023-01-25 | End: 2023-01-25

## 2023-01-25 RX ADMIN — SODIUM CHLORIDE 25 ML/HR: 9 INJECTION, SOLUTION INTRAVENOUS at 08:46

## 2023-01-25 RX ADMIN — ACETAMINOPHEN 650 MG: 325 TABLET ORAL at 08:31

## 2023-01-25 RX ADMIN — DIPHENHYDRAMINE HYDROCHLORIDE 50 MG: 50 INJECTION, SOLUTION INTRAMUSCULAR; INTRAVENOUS at 08:44

## 2023-01-25 RX ADMIN — Medication 10 ML: at 08:46

## 2023-01-25 NOTE — PROGRESS NOTES
[facility] Rheumatology  OBIC Note    Date: 2023  Name: Rosie Marcelino  MRN: [id number]       : 1950  Diagnosis: IgG4-related Sclerosing Cholangitis (K83.09)  Treatment: Ruxience 1000mg Day 15  Referring Provider: Dr. Ashley Bello  Supervising Provider: Dr. Dick Tidwell    Patient arrived to [facility] at [address] [place] [address]. Mr. Nikki Frausto allergies reviewed and he agreed to receiving today's treatment. A physical assessment was performed initially and post-treatment. Pt. denies new complaints today. Reprinted CT abdomen order for pt per family request    Mr. Lily Ware vitals were monitored before, during and after medication administration.    Vitals:    23 0955 23 1025 23 1110 23 1205   BP: (!) 144/82 135/81 122/78 119/75   Pulse: 60 61 60 (!) 57   Resp: 14 14 16 14   Temp:       SpO2: 99% 98% 99% 98%     Medications given per providers orders:  Administrations This Visit       0.9% sodium chloride infusion       Admin Date  2023 Action  New Bag Dose  25 mL/hr Rate  25 mL/hr Route  IntraVENous Administered By  Hong Edward RN              acetaminophen (TYLENOL) tablet 650 mg       Admin Date  2023 Action  Given Dose  650 mg Route  Oral Administered By  Hong Edward RN              diphenhydrAMINE (BENADRYL) injection 50 mg       Admin Date  2023 Action  Given Dose  50 mg Route  IntraVENous Administered By  Hong Edward RN              methylPREDNISolone (PF) (Solu-MEDROL) injection 125 mg       Admin Date  2023 Action  Given Dose  125 mg Route  IntraVENous Administered By  Hong Edward RN              riTUXimab-pvvr (Jb Mondragon) 1,000 mg in 0.9% sodium chloride 250 mL infusion       Admin Date  2023 Action  New Bag Dose  1,000 mg Route  IntraVENous Administered By  Hong Edward RN              sodium chloride (NS) flush 10 mL       Admin Date  2023 Action  Given Dose  10 mL Route  IntraVENous Administered By  Hong Edward RN                Acetaminophen 325mg tablets x 2 (650mg total)  NDC 0392-8487-69  Lot # [id number]  Exp 07/2025    Benadryl 50mg IVP   [facility] 08890-582-89  Lot# [id number]  Exp 07/2024    125mg Solumedrol IVP  NDC 9860-2747-50  Lot # [id number]  Exp 08/2024    250 ml bag 0.9% Normal Saline @ 25ml/hr  NDC 7634-9142-51  Lot # [id number]  Exp 12/2023    0.9% Normal Saline 10ml flush  NDC 8868591722  Lot # [id number]  Exp 04/30/2025    Ruxience 1000mg  NDC 6546-7677-04  Lot # [id number]  Exp 02/29/24  ---mixed in---  250ml bag 0.9% Normal Saline  NDC 0260-9257-97  Lot # [id number]  Exp  7/31/24     START: 25ml/hr @ 8549  50ml/hr @ 7443  75ml/hr @ 9031  100ml/hr @ 5187  STOP: 1200    Lines: 24G LFA. Dionicio Hylton Blood return noted and IV site assessed before, during, and after treatment. Line flushed with 10-30 ml's 0.9% Normal Saline solution per protocol. Access was removed from Mr. Jennifer Viveros after completion of infusion/injection. Mr. Jennifer Viveros tolerated the treatment without complaints and no medication reactions were seen while in presence of this RN. Patient provided with AVS, which included future appointments and written educational material regarding Ruxience. All of the patients questions were answered and then discharged ambulatory from Rheumatology OBIC in stable condition at 1215. Encounter routed to supervising provider for co-signing.      Future Appointments   Date Time Provider Marina Warner   4/12/2023  8:20 AM Ronda Elmore BS AMB   7/12/2023  9:00 AM INFUSIONINJ[facility]RC [facility] BS AMB   7/26/2023  8:30 AM MD LYNN RojasCR BS AMB   7/26/2023  9:00 AM INFUSIONINJ_RC [facility]CR BS AMB     Marlon Gar RN  January 25, 2023

## 2023-02-08 ENCOUNTER — TELEPHONE (OUTPATIENT)
Dept: RHEUMATOLOGY | Age: 73
End: 2023-02-08

## 2023-02-08 NOTE — TELEPHONE ENCOUNTER
----- Message from Andrei Madrigal MD sent at 2/8/2023  3:20 PM EST -----  [phone] option1    He has had contrast with all othe CT's. Needs the contrast to evaluate the lesions.

## 2023-02-08 NOTE — TELEPHONE ENCOUNTER
Spoke to CT informed them per Dr Walterine Babinski that the patient is to receive the CT abd w contrast, CT verbally acknowledged understanding and stated that what the CT was approved and scheduled for, I verbally acknowledged understanding

## 2023-04-29 DIAGNOSIS — K83.09 IGG4-RELATED SCLEROSING CHOLANGITIS: Primary | ICD-10-CM

## 2023-07-10 NOTE — PROGRESS NOTES
[facility] Rheumatology  OBIC Note    Date: 2023  Name: Andrzej Bravo  MRN: [id number]       : 1950  Diagnosis: IgG4- related Sclerosing Cholangitis  (K83.09)  Treatment: Ruxience 1000mg Day 1   Referring Provider: Dr. Micaela Lora  Supervising Provider: Dr. Halley Cronin    Patient arrived to [facility] at 0930. Mr. Cha [place] allergies reviewed and he agreed to receiving today's treatment. A physical assessment was performed initially and post-treatment. Pt. denies new complaints today. Mr. Elba Reynoso vitals were monitored before, during and after medication administration. Vitals:    23 1010 23 1040 23 1120 23 1215   BP: 108/69 122/72 121/76 134/78   Pulse: 54 56 64 52   Resp: 14 14 14 14   Temp:       TempSrc:       SpO2:  96%  98%   Labs drawn and walked to [facility] lab.     Recent labs results:  Lab Results   Component Value Date/Time     2023 09:31 AM    K 4.9 2023 09:31 AM     2023 09:31 AM    CO2 29 2023 09:31 AM    BUN 28 2023 09:31 AM    CREATININE 1.21 2023 09:31 AM    GLUCOSE 106 2023 09:31 AM    CALCIUM 9.5 2023 09:31 AM    LABGLOM >60 2022 10:58 AM      Lab Results   Component Value Date    WBC 6.9 2023    HGB 14.3 2023    HCT 45.0 2023    MCV 95.1 2023     2023     Medications given per providers orders:  Administrations This Visit       0.9 % sodium chloride infusion       Admin Date  2023 Action  New Bag Dose  25 mL/hr Rate  25 mL/hr Route  IntraVENous Administered By  Darshan Parson RN              acetaminophen (TYLENOL) tablet 650 mg       Admin Date  2023 Action  Given Dose  650 mg Route  Oral Administered By  Darshan Parson RN              diphenhydrAMINE (BENADRYL) injection 50 mg       Admin Date  2023 Action  Given Dose  50 mg Route  IntraVENous Administered By  Darshan Parson RN              methylPREDNISolone sodium (PF) (SOLU-MEDROL PF) injection 125 mg

## 2023-07-12 ENCOUNTER — NURSE ONLY (OUTPATIENT)
Age: 73
End: 2023-07-12
Payer: COMMERCIAL

## 2023-07-12 VITALS
HEART RATE: 52 BPM | TEMPERATURE: 98 F | SYSTOLIC BLOOD PRESSURE: 134 MMHG | RESPIRATION RATE: 14 BRPM | DIASTOLIC BLOOD PRESSURE: 78 MMHG | OXYGEN SATURATION: 98 %

## 2023-07-12 DIAGNOSIS — D89.89 IGG4 RELATED DISEASE (HCC): ICD-10-CM

## 2023-07-12 DIAGNOSIS — K83.09 IGG4-RELATED SCLEROSING CHOLANGITIS: Primary | ICD-10-CM

## 2023-07-12 PROCEDURE — 96415 CHEMO IV INFUSION ADDL HR: CPT | Performed by: PEDIATRICS

## 2023-07-12 PROCEDURE — 96413 CHEMO IV INFUSION 1 HR: CPT | Performed by: PEDIATRICS

## 2023-07-12 PROCEDURE — 96375 TX/PRO/DX INJ NEW DRUG ADDON: CPT | Performed by: PEDIATRICS

## 2023-07-12 PROCEDURE — 96374 THER/PROPH/DIAG INJ IV PUSH: CPT | Performed by: PEDIATRICS

## 2023-07-12 RX ORDER — DIPHENHYDRAMINE HYDROCHLORIDE 50 MG/ML
50 INJECTION INTRAMUSCULAR; INTRAVENOUS ONCE
OUTPATIENT
Start: 2023-07-26 | End: 2023-07-26

## 2023-07-12 RX ORDER — SODIUM CHLORIDE 0.9 % (FLUSH) 0.9 %
5-40 SYRINGE (ML) INJECTION PRN
OUTPATIENT
Start: 2023-07-26

## 2023-07-12 RX ORDER — ACETAMINOPHEN 325 MG/1
650 TABLET ORAL ONCE
Status: COMPLETED | OUTPATIENT
Start: 2023-07-12 | End: 2023-07-12

## 2023-07-12 RX ORDER — ALBUTEROL SULFATE 90 UG/1
4 AEROSOL, METERED RESPIRATORY (INHALATION) PRN
OUTPATIENT
Start: 2023-07-26

## 2023-07-12 RX ORDER — SODIUM CHLORIDE 9 MG/ML
5-250 INJECTION, SOLUTION INTRAVENOUS PRN
Status: DISCONTINUED | OUTPATIENT
Start: 2023-07-12 | End: 2023-07-12 | Stop reason: HOSPADM

## 2023-07-12 RX ORDER — SODIUM CHLORIDE 0.9 % (FLUSH) 0.9 %
5-40 SYRINGE (ML) INJECTION PRN
Status: DISCONTINUED | OUTPATIENT
Start: 2023-07-12 | End: 2023-07-12 | Stop reason: HOSPADM

## 2023-07-12 RX ORDER — ACETAMINOPHEN 325 MG/1
650 TABLET ORAL
OUTPATIENT
Start: 2023-07-26

## 2023-07-12 RX ORDER — HEPARIN SODIUM 100 [USP'U]/ML
500 INJECTION, SOLUTION INTRAVENOUS PRN
OUTPATIENT
Start: 2023-07-26

## 2023-07-12 RX ORDER — SODIUM CHLORIDE 9 MG/ML
5-250 INJECTION, SOLUTION INTRAVENOUS PRN
OUTPATIENT
Start: 2023-07-26

## 2023-07-12 RX ORDER — DIPHENHYDRAMINE HYDROCHLORIDE 50 MG/ML
50 INJECTION INTRAMUSCULAR; INTRAVENOUS
OUTPATIENT
Start: 2023-07-26

## 2023-07-12 RX ORDER — DIPHENHYDRAMINE HYDROCHLORIDE 50 MG/ML
50 INJECTION INTRAMUSCULAR; INTRAVENOUS ONCE
Status: COMPLETED | OUTPATIENT
Start: 2023-07-12 | End: 2023-07-12

## 2023-07-12 RX ORDER — SODIUM CHLORIDE 9 MG/ML
INJECTION, SOLUTION INTRAVENOUS CONTINUOUS
OUTPATIENT
Start: 2023-07-26

## 2023-07-12 RX ORDER — EPINEPHRINE 1 MG/ML
0.3 INJECTION, SOLUTION, CONCENTRATE INTRAVENOUS PRN
OUTPATIENT
Start: 2023-07-26

## 2023-07-12 RX ORDER — ONDANSETRON 2 MG/ML
8 INJECTION INTRAMUSCULAR; INTRAVENOUS
OUTPATIENT
Start: 2023-07-26

## 2023-07-12 RX ORDER — MEPERIDINE HYDROCHLORIDE 25 MG/ML
12.5 INJECTION INTRAMUSCULAR; INTRAVENOUS; SUBCUTANEOUS PRN
OUTPATIENT
Start: 2023-07-26

## 2023-07-12 RX ORDER — ACETAMINOPHEN 325 MG/1
650 TABLET ORAL ONCE
OUTPATIENT
Start: 2023-07-26 | End: 2023-07-26

## 2023-07-12 RX ADMIN — DIPHENHYDRAMINE HYDROCHLORIDE 50 MG: 50 INJECTION INTRAMUSCULAR; INTRAVENOUS at 09:07

## 2023-07-12 RX ADMIN — Medication 10 ML: at 09:07

## 2023-07-12 RX ADMIN — ACETAMINOPHEN 650 MG: 325 TABLET ORAL at 08:40

## 2023-07-12 RX ADMIN — SODIUM CHLORIDE 25 ML/HR: 9 INJECTION, SOLUTION INTRAVENOUS at 09:05

## 2023-07-12 ASSESSMENT — PATIENT HEALTH QUESTIONNAIRE - PHQ9
SUM OF ALL RESPONSES TO PHQ9 QUESTIONS 1 & 2: 0
SUM OF ALL RESPONSES TO PHQ QUESTIONS 1-9: 0
SUM OF ALL RESPONSES TO PHQ QUESTIONS 1-9: 0
1. LITTLE INTEREST OR PLEASURE IN DOING THINGS: 0
SUM OF ALL RESPONSES TO PHQ QUESTIONS 1-9: 0
SUM OF ALL RESPONSES TO PHQ QUESTIONS 1-9: 0
2. FEELING DOWN, DEPRESSED OR HOPELESS: 0

## 2023-07-12 ASSESSMENT — PAIN SCALES - GENERAL
PAINLEVEL_OUTOF10: 0
PAINLEVEL_OUTOF10: 0

## 2023-07-13 LAB
BASOPHILS # BLD: 0 K/UL (ref 0–0.1)
BASOPHILS NFR BLD: 1 % (ref 0–1)
DIFFERENTIAL METHOD BLD: ABNORMAL
EOSINOPHIL # BLD: 0.2 K/UL (ref 0–0.4)
EOSINOPHIL NFR BLD: 3 % (ref 0–7)
ERYTHROCYTE [DISTWIDTH] IN BLOOD BY AUTOMATED COUNT: 14.6 % (ref 11.5–14.5)
HBV SURFACE AB SER QL: REACTIVE
HBV SURFACE AB SER-ACNC: 43.78 MIU/ML
HBV SURFACE AG SER QL: <0.1 INDEX
HBV SURFACE AG SER QL: NEGATIVE
HCT VFR BLD AUTO: 34.3 % (ref 36.6–50.3)
HGB BLD-MCNC: 12.2 G/DL (ref 12.1–17)
IMM GRANULOCYTES # BLD AUTO: 0.1 K/UL (ref 0–0.04)
IMM GRANULOCYTES NFR BLD AUTO: 1 % (ref 0–0.5)
LYMPHOCYTES # BLD: 2.2 K/UL (ref 0.8–3.5)
LYMPHOCYTES NFR BLD: 33 % (ref 12–49)
MCH RBC QN AUTO: 34.8 PG (ref 26–34)
MCHC RBC AUTO-ENTMCNC: 35.6 G/DL (ref 30–36.5)
MCV RBC AUTO: 97.7 FL (ref 80–99)
MONOCYTES # BLD: 1.2 K/UL (ref 0–1)
MONOCYTES NFR BLD: 17 % (ref 5–13)
NEUTS SEG # BLD: 3 K/UL (ref 1.8–8)
NEUTS SEG NFR BLD: 45 % (ref 32–75)
NRBC # BLD: 0 K/UL (ref 0–0.01)
NRBC BLD-RTO: 0 PER 100 WBC
PLATELET # BLD AUTO: 321 K/UL (ref 150–400)
PMV BLD AUTO: 10.1 FL (ref 8.9–12.9)
RBC # BLD AUTO: 3.51 M/UL (ref 4.1–5.7)
WBC # BLD AUTO: 6.6 K/UL (ref 4.1–11.1)

## 2023-07-19 RX ORDER — OMEPRAZOLE 20 MG/1
CAPSULE, DELAYED RELEASE ORAL
Qty: 90 CAPSULE | Refills: 3 | Status: SHIPPED | OUTPATIENT
Start: 2023-07-19 | End: 2023-07-31

## 2023-07-21 LAB — HBV CORE AB SERPL QL IA: POSITIVE

## 2023-07-24 NOTE — PROGRESS NOTES
[facility] Rheumatology  OBIC Note    Date: 2023  Name: Lindsay Johnson  MRN: [id number]       : 1950  Diagnosis: IgG4- related Sclerosing Cholangitis  (K83.09)  Treatment: Ruxience 1000mg Day 15  Referring Provider: Dr. Nery Hensley  Supervising Provider: Dr. Nery Hensley    Patient arrived to [facility] at 0671. Mr. Cristel Cardona allergies reviewed and he agreed to receiving today's treatment. A physical assessment was performed initially and post-treatment. Pt. denies new complaints today. Mr. Ashley Arce vitals were monitored before, during and after medication administration. Vitals:    23 1045 23 1115 23 1158 23 1300   BP: (!) 144/82 116/76 118/74 119/77   Pulse: 56 54 54 54   Resp: 14 14 14 14   Temp:       TempSrc:       SpO2:       CMP drawn and walked to [facility] lab for processing.     Recent labs results:  Lab Results   Component Value Date/Time     2023 09:31 AM    K 4.9 2023 09:31 AM     2023 09:31 AM    CO2 29 2023 09:31 AM    BUN 28 2023 09:31 AM    CREATININE 1.21 2023 09:31 AM    GLUCOSE 106 2023 09:31 AM    CALCIUM 9.5 2023 09:31 AM    LABGLOM >60 2022 10:58 AM      Lab Results   Component Value Date    WBC 6.6 2023    HGB 12.2 2023    HCT 34.3 (L) 2023    MCV 97.7 2023     2023   Medications given per providers orders:  Administrations This Visit       0.9 % sodium chloride infusion       Admin Date  2023 Action  New Bag Dose  25 mL/hr Rate  25 mL/hr Route  IntraVENous Administered By  Ronen Rodriguez RN              acetaminophen (TYLENOL) tablet 650 mg       Admin Date  2023 Action  Given Dose  650 mg Route  Oral Administered By  Ronen Rodriguez RN              diphenhydrAMINE (BENADRYL) injection 50 mg       Admin Date  2023 Action  Given Dose  50 mg Route  IntraVENous Administered By  Ronen Rodriguez, RN              methylPREDNISolone sodium (PF) (SOLU-MEDROL PF) injection

## 2023-07-26 ENCOUNTER — NURSE ONLY (OUTPATIENT)
Age: 73
End: 2023-07-26
Payer: COMMERCIAL

## 2023-07-26 ENCOUNTER — OFFICE VISIT (OUTPATIENT)
Age: 73
End: 2023-07-26
Payer: COMMERCIAL

## 2023-07-26 VITALS
SYSTOLIC BLOOD PRESSURE: 117 MMHG | HEART RATE: 64 BPM | OXYGEN SATURATION: 96 % | RESPIRATION RATE: 16 BRPM | DIASTOLIC BLOOD PRESSURE: 75 MMHG | WEIGHT: 129 LBS | TEMPERATURE: 98 F | BODY MASS INDEX: 24.37 KG/M2

## 2023-07-26 VITALS
SYSTOLIC BLOOD PRESSURE: 119 MMHG | HEART RATE: 54 BPM | OXYGEN SATURATION: 97 % | TEMPERATURE: 98 F | RESPIRATION RATE: 14 BRPM | DIASTOLIC BLOOD PRESSURE: 77 MMHG

## 2023-07-26 DIAGNOSIS — D89.89 IGG4 RELATED DISEASE (HCC): ICD-10-CM

## 2023-07-26 DIAGNOSIS — K83.09 IGG4-RELATED SCLEROSING CHOLANGITIS: Primary | ICD-10-CM

## 2023-07-26 PROCEDURE — 99214 OFFICE O/P EST MOD 30 MIN: CPT | Performed by: PEDIATRICS

## 2023-07-26 PROCEDURE — 1123F ACP DISCUSS/DSCN MKR DOCD: CPT | Performed by: PEDIATRICS

## 2023-07-26 PROCEDURE — 96374 THER/PROPH/DIAG INJ IV PUSH: CPT | Performed by: PEDIATRICS

## 2023-07-26 PROCEDURE — 96415 CHEMO IV INFUSION ADDL HR: CPT | Performed by: PEDIATRICS

## 2023-07-26 PROCEDURE — 96413 CHEMO IV INFUSION 1 HR: CPT | Performed by: PEDIATRICS

## 2023-07-26 PROCEDURE — 96375 TX/PRO/DX INJ NEW DRUG ADDON: CPT | Performed by: PEDIATRICS

## 2023-07-26 RX ORDER — HEPARIN 100 UNIT/ML
500 SYRINGE INTRAVENOUS PRN
OUTPATIENT
Start: 2024-01-10

## 2023-07-26 RX ORDER — EPINEPHRINE 1 MG/ML
0.3 INJECTION, SOLUTION, CONCENTRATE INTRAVENOUS PRN
OUTPATIENT
Start: 2024-01-10

## 2023-07-26 RX ORDER — ACETAMINOPHEN 325 MG/1
650 TABLET ORAL ONCE
Status: COMPLETED | OUTPATIENT
Start: 2023-07-26 | End: 2023-07-26

## 2023-07-26 RX ORDER — SODIUM CHLORIDE 9 MG/ML
5-250 INJECTION, SOLUTION INTRAVENOUS PRN
Status: DISCONTINUED | OUTPATIENT
Start: 2023-07-26 | End: 2023-07-26 | Stop reason: HOSPADM

## 2023-07-26 RX ORDER — ACETAMINOPHEN 325 MG/1
650 TABLET ORAL
OUTPATIENT
Start: 2024-01-10

## 2023-07-26 RX ORDER — ONDANSETRON 2 MG/ML
8 INJECTION INTRAMUSCULAR; INTRAVENOUS
OUTPATIENT
Start: 2024-01-10

## 2023-07-26 RX ORDER — DIPHENHYDRAMINE HYDROCHLORIDE 50 MG/ML
50 INJECTION INTRAMUSCULAR; INTRAVENOUS
OUTPATIENT
Start: 2024-01-10

## 2023-07-26 RX ORDER — DIPHENHYDRAMINE HYDROCHLORIDE 50 MG/ML
50 INJECTION INTRAMUSCULAR; INTRAVENOUS ONCE
Status: COMPLETED | OUTPATIENT
Start: 2023-07-26 | End: 2023-07-26

## 2023-07-26 RX ORDER — SODIUM CHLORIDE 9 MG/ML
5-250 INJECTION, SOLUTION INTRAVENOUS PRN
OUTPATIENT
Start: 2024-01-10

## 2023-07-26 RX ORDER — DIPHENHYDRAMINE HYDROCHLORIDE 50 MG/ML
50 INJECTION INTRAMUSCULAR; INTRAVENOUS ONCE
OUTPATIENT
Start: 2024-01-10 | End: 2024-01-10

## 2023-07-26 RX ORDER — SODIUM CHLORIDE 0.9 % (FLUSH) 0.9 %
5-40 SYRINGE (ML) INJECTION PRN
Status: DISCONTINUED | OUTPATIENT
Start: 2023-07-26 | End: 2023-07-26 | Stop reason: HOSPADM

## 2023-07-26 RX ORDER — ACETAMINOPHEN 325 MG/1
650 TABLET ORAL ONCE
OUTPATIENT
Start: 2024-01-10 | End: 2024-01-10

## 2023-07-26 RX ORDER — SODIUM CHLORIDE 9 MG/ML
INJECTION, SOLUTION INTRAVENOUS CONTINUOUS
OUTPATIENT
Start: 2024-01-10

## 2023-07-26 RX ORDER — MEPERIDINE HYDROCHLORIDE 25 MG/ML
12.5 INJECTION INTRAMUSCULAR; INTRAVENOUS; SUBCUTANEOUS PRN
OUTPATIENT
Start: 2024-01-10

## 2023-07-26 RX ORDER — ALBUTEROL SULFATE 90 UG/1
4 AEROSOL, METERED RESPIRATORY (INHALATION) PRN
OUTPATIENT
Start: 2024-01-10

## 2023-07-26 RX ORDER — SODIUM CHLORIDE 0.9 % (FLUSH) 0.9 %
5-40 SYRINGE (ML) INJECTION PRN
OUTPATIENT
Start: 2024-01-10

## 2023-07-26 RX ADMIN — SODIUM CHLORIDE 25 ML/HR: 9 INJECTION, SOLUTION INTRAVENOUS at 09:37

## 2023-07-26 RX ADMIN — ACETAMINOPHEN 650 MG: 325 TABLET ORAL at 09:26

## 2023-07-26 RX ADMIN — Medication 10 ML: at 09:37

## 2023-07-26 RX ADMIN — DIPHENHYDRAMINE HYDROCHLORIDE 50 MG: 50 INJECTION INTRAMUSCULAR; INTRAVENOUS at 09:37

## 2023-07-26 ASSESSMENT — PATIENT HEALTH QUESTIONNAIRE - PHQ9
SUM OF ALL RESPONSES TO PHQ QUESTIONS 1-9: 0
SUM OF ALL RESPONSES TO PHQ9 QUESTIONS 1 & 2: 0
SUM OF ALL RESPONSES TO PHQ QUESTIONS 1-9: 0
1. LITTLE INTEREST OR PLEASURE IN DOING THINGS: 0
SUM OF ALL RESPONSES TO PHQ QUESTIONS 1-9: 0
2. FEELING DOWN, DEPRESSED OR HOPELESS: 0
SUM OF ALL RESPONSES TO PHQ QUESTIONS 1-9: 0

## 2023-07-26 ASSESSMENT — PAIN SCALES - GENERAL
PAINLEVEL_OUTOF10: 2
PAINLEVEL_OUTOF10: 0

## 2023-07-26 ASSESSMENT — PAIN DESCRIPTION - ORIENTATION: ORIENTATION: LEFT

## 2023-07-26 ASSESSMENT — PAIN DESCRIPTION - LOCATION: LOCATION: LEG

## 2023-07-26 NOTE — PROGRESS NOTES
Chief Complaint   Patient presents with    Joint Pain     1. Have you been to the ER, urgent care clinic since your last visit? Hospitalized since your last visit? No    2. Have you seen or consulted any other health care providers outside of the [address] [place] [address] since your last visit? Include any pap smears or colon screening.  No
related to central obstruction. Subtle mild T2 hyperintensity is seen centrally near the level of obstruction. In addition, there is ill-defined masslike enhancement centrally best seen on series 9 image 39 measuring approximately 3.3 x 4.0 cm. There is nodular soft tissue thickening and enhancement extending along the common hepatic duct into the common bile duct. Gallbladder:  Mildly thickwalled, but underdistended gallbladder. No filling defect. Spleen, pancreas, and adrenal glands:   Normal in signal. No focal abnormality. Kidneys:  Symmetric enhancement. No hydronephrosis. MRCP:  Severe, diffuse intrahepatic biliary ductal dilatation related to complete central common hepatic/common bile duct obstruction near the joshua hepatis. The common bile duct is patent the distal to the level of  obstruction all the way to the ampulla with no filling defect or stricture. Normal course and caliber of the pancreatic duct. Other findings:  No ascites. No lymphadenopathy. MRI/MRCP Abdomen with and without contrast 8/09/20214: The common bile is at upper limits of normal size at 6 mm. There is a short segment stricture in the region of the pancreatic head. There is no associated biliary ductal dilatation. Although there is no definite pancreatic head mass, there may be very subtle lower signal intensity within the pancreatic head on postcontrast imaging. This is a questionable finding. There is no intrahepatic biliary dilatation. No focal liver lesion. Liver spleen and kidneys are normal. No adenopathy. PATHOLOGY    Lymph node, right supraclavicular, core biopsy with touch preparation 5/21/2021: Lymphoid tissue with no definitive morphologic, immunohistochemical, or flow cytometric evidence of a lymphoproliferative disorder. No cells diagnostic for malignancy. Bone marrow, posterior iliac crest, aspirate, clot section, and decalcified core biopsy: Normocellular marrow with maturing trilineage hematopoiesis.  No

## 2023-07-27 DIAGNOSIS — K83.09 IGG4-RELATED SCLEROSING CHOLANGITIS: ICD-10-CM

## 2023-07-27 LAB
ALBUMIN SERPL-MCNC: 3.8 G/DL (ref 3.5–5)
ALBUMIN/GLOB SERPL: 1.2 (ref 1.1–2.2)
ALP SERPL-CCNC: 94 U/L (ref 45–117)
ALT SERPL-CCNC: 31 U/L (ref 12–78)
ANION GAP SERPL CALC-SCNC: 1 MMOL/L (ref 5–15)
AST SERPL-CCNC: 15 U/L (ref 15–37)
BILIRUB SERPL-MCNC: 0.3 MG/DL (ref 0.2–1)
BUN SERPL-MCNC: 19 MG/DL (ref 6–20)
BUN/CREAT SERPL: 17 (ref 12–20)
CALCIUM SERPL-MCNC: 9 MG/DL (ref 8.5–10.1)
CHLORIDE SERPL-SCNC: 111 MMOL/L (ref 97–108)
CO2 SERPL-SCNC: 27 MMOL/L (ref 21–32)
CREAT SERPL-MCNC: 1.15 MG/DL (ref 0.7–1.3)
GLOBULIN SER CALC-MCNC: 3.3 G/DL (ref 2–4)
GLUCOSE SERPL-MCNC: 93 MG/DL (ref 65–100)
POTASSIUM SERPL-SCNC: 5.9 MMOL/L (ref 3.5–5.1)
PROT SERPL-MCNC: 7.1 G/DL (ref 6.4–8.2)
SODIUM SERPL-SCNC: 139 MMOL/L (ref 136–145)

## 2023-07-31 RX ORDER — OMEPRAZOLE 20 MG/1
CAPSULE, DELAYED RELEASE ORAL
Qty: 90 CAPSULE | Refills: 1 | Status: SHIPPED | OUTPATIENT
Start: 2023-07-31

## 2023-10-25 ENCOUNTER — TELEMEDICINE (OUTPATIENT)
Age: 73
End: 2023-10-25

## 2023-10-25 DIAGNOSIS — K75.4 AUTOIMMUNE HEPATITIS (HCC): ICD-10-CM

## 2023-10-25 DIAGNOSIS — K83.09 IGG4-RELATED SCLEROSING CHOLANGITIS: Primary | ICD-10-CM

## 2023-10-25 PROCEDURE — 99213 OFFICE O/P EST LOW 20 MIN: CPT | Performed by: PHYSICIAN ASSISTANT

## 2023-10-25 PROCEDURE — 1123F ACP DISCUSS/DSCN MKR DOCD: CPT | Performed by: PHYSICIAN ASSISTANT

## 2023-10-25 ASSESSMENT — PATIENT HEALTH QUESTIONNAIRE - PHQ9
SUM OF ALL RESPONSES TO PHQ QUESTIONS 1-9: 0
SUM OF ALL RESPONSES TO PHQ9 QUESTIONS 1 & 2: 0
2. FEELING DOWN, DEPRESSED OR HOPELESS: 0
1. LITTLE INTEREST OR PLEASURE IN DOING THINGS: 0
SUM OF ALL RESPONSES TO PHQ QUESTIONS 1-9: 0

## 2023-10-25 NOTE — PROGRESS NOTES
Identified pt with two pt identifiers(name and ). Reviewed record in preparation for visit and have obtained necessary documentation. There were no vitals filed for this visit. Health Maintenance Review: Patient reminded of \"due or due soon\" health maintenance. I have asked the patient to contact his/her primary care provider (PCP) for follow-up on his/her health maintenance. Coordination of Care Questionnaire:  :   1) Have you been to an emergency room, urgent care, or hospitalized since your last visit? If yes, where when, and reason for visit? no       2. Have seen or consulted any other health care provider since your last visit? If yes, where when, and reason for visit?  no      Patient is accompanied by son I have received verbal consent from Lindy Marin to discuss any/all medical information while they are present in the room.
Dr Elvis Hughes. There is some discussion that the interval of the Rituxin infusions will be determined on the basis of ongoing CT response results. I have asked that if the infusion is reduced in frequency to less than every 6 months, we be advised as we would likely repeat lab values more frequently in that setting. Serologic testing was negative for IgG4 and positive for ANALISA, ASMA. The Fibroscan will continue to be repeated every 12-18 months or as often as clinically indicated to assess for fibrosis progression and/or regression. Will plan on repeat assessment in 1/2024 when he is coming to [address] [place] [address] for the CT scan. We can draw any additional labs at that time as well. Autoimmune pancreatitis  The patient developed acute pancreatitis without obvious etiology in 2013. The pancreas was diffusely enlarged consistent with autoimmune pancreatitis. An EUS with biopsy of the pancreas demonstrated inflammation and was consistent with autoimmune pancreatitis. The patient was initially treated with steroids for both autoimmune hepatitis and pancreatitis in 2013. He recieved several different treatments for the 2 immune diseases as noted above. He was started on tacrolimus in 2016 and with this was able to taper and stop both prednsione and azathioprine without relapse of ether autoimmune hepatitis or pancreatitis. 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 This was managed by Dr Tayla Wallace. The CBD stricture was treated with stenting but did not resolve until a metal stent was placed in 4/2016. This was removed in 9/2018. He has had no additional issues identified on ongoing CT evaluations and no pain symptoms. Retroperitoneal fibrosis  The patient was found to have right hydronephrosis in 2/2021.     This was treated with stenting of

## 2024-01-02 ENCOUNTER — OFFICE VISIT (OUTPATIENT)
Age: 74
End: 2024-01-02
Payer: COMMERCIAL

## 2024-01-02 ENCOUNTER — HOSPITAL ENCOUNTER (OUTPATIENT)
Facility: HOSPITAL | Age: 74
Discharge: HOME | End: 2024-01-05
Attending: PEDIATRICS
Payer: COMMERCIAL

## 2024-01-02 VITALS
WEIGHT: 137.2 LBS | TEMPERATURE: 97.8 F | BODY MASS INDEX: 25.9 KG/M2 | DIASTOLIC BLOOD PRESSURE: 92 MMHG | SYSTOLIC BLOOD PRESSURE: 155 MMHG | HEART RATE: 60 BPM | OXYGEN SATURATION: 98 % | HEIGHT: 61 IN

## 2024-01-02 DIAGNOSIS — K83.09 IGG4-RELATED SCLEROSING CHOLANGITIS: ICD-10-CM

## 2024-01-02 DIAGNOSIS — K83.09 IGG4-RELATED SCLEROSING CHOLANGITIS: Primary | ICD-10-CM

## 2024-01-02 DIAGNOSIS — K75.4 AUTOIMMUNE HEPATITIS (HCC): ICD-10-CM

## 2024-01-02 DIAGNOSIS — D89.89 IGG4 RELATED DISEASE: ICD-10-CM

## 2024-01-02 LAB
BUN SERPL-MCNC: 17 MG/DL (ref 8–27)
BUN/CREAT SERPL: 15 (ref 10–24)
CALCIUM SERPL-MCNC: 9.5 MG/DL (ref 8.6–10.2)
CHLORIDE SERPL-SCNC: 104 MMOL/L (ref 96–106)
CO2 SERPL-SCNC: 24 MMOL/L (ref 20–29)
CREAT BLD-MCNC: 1.3 MG/DL (ref 0.6–1.3)
CREAT SERPL-MCNC: 1.14 MG/DL (ref 0.76–1.27)
EGFRCR SERPLBLD CKD-EPI 2021: 68 ML/MIN/1.73
ERYTHROCYTE [DISTWIDTH] IN BLOOD BY AUTOMATED COUNT: 13.4 % (ref 11.6–15.4)
GLUCOSE SERPL-MCNC: 95 MG/DL (ref 70–99)
HCT VFR BLD AUTO: 45.1 % (ref 37.5–51)
HGB BLD-MCNC: 14.5 G/DL (ref 13–17.7)
MCH RBC QN AUTO: 30 PG (ref 26.6–33)
MCHC RBC AUTO-ENTMCNC: 32.2 G/DL (ref 31.5–35.7)
MCV RBC AUTO: 93 FL (ref 79–97)
PLATELET # BLD AUTO: 303 X10E3/UL (ref 150–450)
POTASSIUM SERPL-SCNC: 5.1 MMOL/L (ref 3.5–5.2)
RBC # BLD AUTO: 4.84 X10E6/UL (ref 4.14–5.8)
SODIUM SERPL-SCNC: 143 MMOL/L (ref 134–144)
WBC # BLD AUTO: 6.5 X10E3/UL (ref 3.4–10.8)

## 2024-01-02 PROCEDURE — 1123F ACP DISCUSS/DSCN MKR DOCD: CPT | Performed by: PHYSICIAN ASSISTANT

## 2024-01-02 PROCEDURE — 82565 ASSAY OF CREATININE: CPT

## 2024-01-02 PROCEDURE — 91200 LIVER ELASTOGRAPHY: CPT | Performed by: PHYSICIAN ASSISTANT

## 2024-01-02 PROCEDURE — 74177 CT ABD & PELVIS W/CONTRAST: CPT

## 2024-01-02 PROCEDURE — 99214 OFFICE O/P EST MOD 30 MIN: CPT | Performed by: PHYSICIAN ASSISTANT

## 2024-01-02 PROCEDURE — 6360000004 HC RX CONTRAST MEDICATION: Performed by: INTERNAL MEDICINE

## 2024-01-02 RX ADMIN — IOPAMIDOL 100 ML: 755 INJECTION, SOLUTION INTRAVENOUS at 10:46

## 2024-01-02 ASSESSMENT — PATIENT HEALTH QUESTIONNAIRE - PHQ9
SUM OF ALL RESPONSES TO PHQ QUESTIONS 1-9: 0
SUM OF ALL RESPONSES TO PHQ9 QUESTIONS 1 & 2: 0
SUM OF ALL RESPONSES TO PHQ QUESTIONS 1-9: 0
1. LITTLE INTEREST OR PLEASURE IN DOING THINGS: 0
2. FEELING DOWN, DEPRESSED OR HOPELESS: 0

## 2024-01-02 NOTE — PROGRESS NOTES
Identified pt with two pt identifiers(name and ). Reviewed record in preparation for visit and have obtained necessary documentation.    Chief Complaint   Patient presents with    Other     FS     BP (!) 155/92 (Site: Left Upper Arm, Position: Sitting, Cuff Size: Medium Adult)   Pulse 60   Temp 97.8 °F (36.6 °C)   Ht 1.549 m (5' 1\")   Wt 62.2 kg (137 lb 3.2 oz)   SpO2 98%   BMI 25.92 kg/m²       1. \"Have you been to the ER, urgent care clinic since your last visit?  Hospitalized since your last visit?\" No    2. \"Have you seen or consulted any other health care providers outside of the [facility] since your last visit?\" No     Patient is accompanied by son I have received verbal consent from Marcelina Dempsey to discuss any/all medical information while they are present in the room.

## 2024-01-02 NOTE — PROGRESS NOTES
[facility]      Miguel Smallwood MD, FACP, FACG, FAASLD      MARY JANE Smith, [facility]   Kim Archer, [facility]   Rebeca Mayen, FNP-C   Axel Mckenzie, FNP-C    Juliette Martinez, [facility]    at [facility]    [address], [address]    [place]  [address] [phone]    FAX: [phone]   [facility]    at [facility]    [address], [address]    [place]  [address] [phone]    FAX: [phone]     Patient Care Team:  None, None as PCP - General    Patient Active Problem List   Diagnosis    IgG4-related sclerosing cholangitis    Autoimmune hepatitis (HCC)    Retroperitoneal fibrosis    Autoimmune sclerosing pancreatitis (HCC)    IgG4 related disease     Marcelina Dempsey returns to the Liver [facility] for management of IGG4 autoimmune cholangitis.  The active problem list, all pertinent past medical history, medications, endoscopic studies, radiologic findings and laboratory findings related to the liver disorder were reviewed with the patient.      The patient speaks limited English.  The patient chose to use language translation by a family member who was with the patient during this appointment.    The patient is a 73 y.o.  male who developed acute pancreatitis and was noted to have elevated liver enzymes in 2013.  Imaging demonstrated enlargement of the pancreas and EUS guided biopsy was consistent with autoimmune pancreatitis.  A Liver biopsy in 2013 was consistent with autoimmune hepatitis and stage 3 fibrosis.      He had been maintained on tacrolimus 1 mg daily at the time of diagnosis in 2016. This was generally well tolerated. There were a number of alternative immunosuppressants that had been used with significant side effects prior to this

## 2024-01-03 LAB
ALBUMIN SERPL-MCNC: 4.4 G/DL (ref 3.8–4.8)
ALP SERPL-CCNC: 90 IU/L (ref 44–121)
ALT SERPL-CCNC: 12 IU/L (ref 0–44)
AST SERPL-CCNC: 18 IU/L (ref 0–40)
BILIRUB DIRECT SERPL-MCNC: 0.18 MG/DL (ref 0–0.4)
BILIRUB SERPL-MCNC: 0.6 MG/DL (ref 0–1.2)
PROT SERPL-MCNC: 7.1 G/DL (ref 6–8.5)

## 2024-01-03 NOTE — RESULT ENCOUNTER NOTE
Pt's son notified of stable findings/labs. Will forward to Dr Watson, next rituximab infusion 1/17/2024. Follow-up as scheduled.

## 2024-01-16 NOTE — PROGRESS NOTES
[facility]  [facility] Note    Date: 2024  Name: Marcelina Dempsey  MRN: [id number]       : 1950  Diagnosis: IgG4- related Sclerosing Cholangitis  (K83.09)  Treatment: Ruxience 1000mg Day 1  Referring Provider: Dr. Watson  Supervising Provider: Dr. Watson    Patient arrived to [facility] at 0830.  Mr. Dempsey allergies reviewed and he agreed to receiving today's treatment. A physical assessment was performed initially and post-treatment. Pt. denies new complaints today.    Mr. Dempsey's vitals were monitored before, during and after medication administration.   Vitals:    24 0922 24 0953 24 1025 24 1105   BP: 120/76 126/77 115/74 122/76   Pulse: 56 58 61 58   Resp: 14 14 14 14   Temp:       SpO2: 99% 99% 100% 98%     Recent labs results:  Lab Results   Component Value Date/Time     2024 12:12 PM    K 5.1 2024 12:12 PM     2024 12:12 PM    CO2 24 2024 12:12 PM    BUN 17 2024 12:12 PM    CREATININE 1.14 2024 12:12 PM    CREATININE 1.30 2024 10:19 AM    GLUCOSE 95 2024 12:12 PM    CALCIUM 9.5 2024 12:12 PM    LABGLOM 68 2024 12:12 PM      Lab Results   Component Value Date    WBC 6.5 2024    HGB 14.5 2024    HCT 45.1 2024    MCV 93 2024     2024   Medications given per providers orders:  Administrations This Visit       0.9 % sodium chloride infusion       Admin Date  2024 Action  New Bag Dose  25 mL/hr Rate  25 mL/hr Route  IntraVENous Administered By  Pastora Briseno, RN              acetaminophen (TYLENOL) tablet 650 mg       Admin Date  2024 Action  Given Dose  650 mg Route  Oral Administered By  Pastora Briseno, JESE              diphenhydrAMINE (BENADRYL) injection 50 mg       Admin Date  2024 Action  Given Dose  50 mg Route  IntraVENous Administered By  Pastora Briseno, RN              methylPREDNISolone sodium succ (SOLU-MEDROL) 125 mg in sterile water 2 mL

## 2024-01-17 ENCOUNTER — NURSE ONLY (OUTPATIENT)
Age: 74
End: 2024-01-17

## 2024-01-17 ENCOUNTER — OFFICE VISIT (OUTPATIENT)
Age: 74
End: 2024-01-17

## 2024-01-17 VITALS
TEMPERATURE: 98.7 F | SYSTOLIC BLOOD PRESSURE: 116 MMHG | HEART RATE: 70 BPM | OXYGEN SATURATION: 98 % | RESPIRATION RATE: 18 BRPM | DIASTOLIC BLOOD PRESSURE: 69 MMHG

## 2024-01-17 VITALS
TEMPERATURE: 97.9 F | DIASTOLIC BLOOD PRESSURE: 76 MMHG | BODY MASS INDEX: 25.7 KG/M2 | WEIGHT: 136 LBS | RESPIRATION RATE: 18 BRPM | OXYGEN SATURATION: 99 % | SYSTOLIC BLOOD PRESSURE: 122 MMHG | HEART RATE: 58 BPM

## 2024-01-17 DIAGNOSIS — D89.89 IGG4 RELATED DISEASE: ICD-10-CM

## 2024-01-17 DIAGNOSIS — K83.09 IGG4-RELATED SCLEROSING CHOLANGITIS: Primary | ICD-10-CM

## 2024-01-17 PROCEDURE — 1123F ACP DISCUSS/DSCN MKR DOCD: CPT | Performed by: PEDIATRICS

## 2024-01-17 PROCEDURE — 99214 OFFICE O/P EST MOD 30 MIN: CPT | Performed by: PEDIATRICS

## 2024-01-17 RX ORDER — SODIUM CHLORIDE 0.9 % (FLUSH) 0.9 %
5-40 SYRINGE (ML) INJECTION PRN
Status: DISCONTINUED | OUTPATIENT
Start: 2024-01-17 | End: 2024-01-17 | Stop reason: HOSPADM

## 2024-01-17 RX ORDER — DIPHENHYDRAMINE HYDROCHLORIDE 50 MG/ML
50 INJECTION INTRAMUSCULAR; INTRAVENOUS ONCE
OUTPATIENT
Start: 2024-01-21 | End: 2024-01-21

## 2024-01-17 RX ORDER — MEPERIDINE HYDROCHLORIDE 25 MG/ML
12.5 INJECTION INTRAMUSCULAR; INTRAVENOUS; SUBCUTANEOUS PRN
OUTPATIENT
Start: 2024-01-21

## 2024-01-17 RX ORDER — SODIUM CHLORIDE 9 MG/ML
5-250 INJECTION, SOLUTION INTRAVENOUS PRN
OUTPATIENT
Start: 2024-01-21

## 2024-01-17 RX ORDER — ONDANSETRON 2 MG/ML
8 INJECTION INTRAMUSCULAR; INTRAVENOUS
OUTPATIENT
Start: 2024-01-21

## 2024-01-17 RX ORDER — SODIUM CHLORIDE 0.9 % (FLUSH) 0.9 %
5-40 SYRINGE (ML) INJECTION PRN
OUTPATIENT
Start: 2024-01-21

## 2024-01-17 RX ORDER — ACETAMINOPHEN 325 MG/1
650 TABLET ORAL ONCE
Status: COMPLETED | OUTPATIENT
Start: 2024-01-17 | End: 2024-01-17

## 2024-01-17 RX ORDER — SODIUM CHLORIDE 9 MG/ML
5-250 INJECTION, SOLUTION INTRAVENOUS PRN
Status: DISCONTINUED | OUTPATIENT
Start: 2024-01-17 | End: 2024-01-17 | Stop reason: HOSPADM

## 2024-01-17 RX ORDER — DIPHENHYDRAMINE HYDROCHLORIDE 50 MG/ML
50 INJECTION INTRAMUSCULAR; INTRAVENOUS
OUTPATIENT
Start: 2024-01-21

## 2024-01-17 RX ORDER — ALBUTEROL SULFATE 90 UG/1
4 AEROSOL, METERED RESPIRATORY (INHALATION) PRN
OUTPATIENT
Start: 2024-01-21

## 2024-01-17 RX ORDER — EPINEPHRINE 1 MG/ML
0.3 INJECTION, SOLUTION, CONCENTRATE INTRAVENOUS PRN
OUTPATIENT
Start: 2024-01-21

## 2024-01-17 RX ORDER — SODIUM CHLORIDE 9 MG/ML
INJECTION, SOLUTION INTRAVENOUS CONTINUOUS
OUTPATIENT
Start: 2024-01-21

## 2024-01-17 RX ORDER — ACETAMINOPHEN 325 MG/1
650 TABLET ORAL
OUTPATIENT
Start: 2024-01-21

## 2024-01-17 RX ORDER — ACETAMINOPHEN 325 MG/1
650 TABLET ORAL ONCE
OUTPATIENT
Start: 2024-01-21 | End: 2024-01-21

## 2024-01-17 RX ORDER — HEPARIN 100 UNIT/ML
500 SYRINGE INTRAVENOUS PRN
OUTPATIENT
Start: 2024-01-21

## 2024-01-17 RX ORDER — DIPHENHYDRAMINE HYDROCHLORIDE 50 MG/ML
50 INJECTION INTRAMUSCULAR; INTRAVENOUS ONCE
Status: COMPLETED | OUTPATIENT
Start: 2024-01-17 | End: 2024-01-17

## 2024-01-17 RX ORDER — METHYLPREDNISOLONE SODIUM SUCCINATE 125 MG/2ML
125 INJECTION, POWDER, LYOPHILIZED, FOR SOLUTION INTRAMUSCULAR; INTRAVENOUS ONCE
Qty: 1 EACH | Refills: 0 | Status: SHIPPED | OUTPATIENT
Start: 2024-01-17 | End: 2024-01-17 | Stop reason: ALTCHOICE

## 2024-01-17 RX ADMIN — Medication 10 ML: at 08:48

## 2024-01-17 RX ADMIN — DIPHENHYDRAMINE HYDROCHLORIDE 50 MG: 50 INJECTION INTRAMUSCULAR; INTRAVENOUS at 08:45

## 2024-01-17 RX ADMIN — SODIUM CHLORIDE 25 ML/HR: 9 INJECTION, SOLUTION INTRAVENOUS at 08:49

## 2024-01-17 RX ADMIN — ACETAMINOPHEN 650 MG: 325 TABLET ORAL at 08:38

## 2024-01-17 ASSESSMENT — PATIENT HEALTH QUESTIONNAIRE - PHQ9
SUM OF ALL RESPONSES TO PHQ9 QUESTIONS 1 & 2: 0
2. FEELING DOWN, DEPRESSED OR HOPELESS: 0
SUM OF ALL RESPONSES TO PHQ QUESTIONS 1-9: 0
1. LITTLE INTEREST OR PLEASURE IN DOING THINGS: 0
SUM OF ALL RESPONSES TO PHQ QUESTIONS 1-9: 0

## 2024-01-17 ASSESSMENT — PAIN SCALES - GENERAL
PAINLEVEL_OUTOF10: 0
PAINLEVEL_OUTOF10: 0

## 2024-01-17 NOTE — PROGRESS NOTES
Chief Complaint   Patient presents with    Joint Pain     1. Have you been to the ER, urgent care clinic since your last visit?  Hospitalized since your last visit?No    2. Have you seen or consulted any other health care providers outside of the [facility] since your last visit?  Include any pap smears or colon screening. No    
ordered to be done prior to next appointment  3.  Check CBC, CMP prior to next appointment  4.  Follow up at infusions    Richard Watson MD  Adult and Pediatric Rheumatology     [facility] [place]   [address] [place] [address], Phone [phone], Fax [phone]    Visiting  of Pediatrics    Department of Pediatrics, [facility]   [address], [place] [address], Phone [phone], Fax [phone]    There are no Patient Instructions on file for this visit.     cc:  None, None   Dr. Darling - urology    I, Richard Watson MD, personally performed the services described in the documentation as scribed by Mary Miramontes in my presence and have reviewed and agree with the note as scribed.

## 2024-01-24 ENCOUNTER — TELEPHONE (OUTPATIENT)
Age: 74
End: 2024-01-24

## 2024-01-24 NOTE — TELEPHONE ENCOUNTER
AISHWARYAM on Mr. Dempsey's home phone to notify of needing to cancel his day 15 infusion.  Attempted to reach pts son today to notify as well since I have not heard from them.      While on the phone, son translated it to pt and his wife.      Pt also requesting new CT order as written in Dr. Watson's note.

## 2024-04-05 ENCOUNTER — TELEPHONE (OUTPATIENT)
Age: 74
End: 2024-04-05

## 2024-04-05 NOTE — TELEPHONE ENCOUNTER
Zulema at [facility] is calling she states that a PA for Rituximab was sent to her on 3/25/24. However it was sent to the wrong department and needs to [facility] [phone] (f)

## 2024-07-10 DIAGNOSIS — K83.09 IGG4-RELATED SCLEROSING CHOLANGITIS (HCC): ICD-10-CM

## 2024-07-10 DIAGNOSIS — D89.84 IGG4 RELATED DISEASE (HCC): Primary | ICD-10-CM

## 2024-07-10 DIAGNOSIS — D89.84 IGG4-RELATED SCLEROSING CHOLANGITIS (HCC): ICD-10-CM

## 2024-07-10 RX ORDER — DIPHENHYDRAMINE HYDROCHLORIDE 50 MG/ML
50 INJECTION INTRAMUSCULAR; INTRAVENOUS ONCE
OUTPATIENT
Start: 2024-07-10 | End: 2024-07-10

## 2024-07-10 RX ORDER — ACETAMINOPHEN 325 MG/1
650 TABLET ORAL
OUTPATIENT
Start: 2024-07-10

## 2024-07-10 RX ORDER — SODIUM CHLORIDE 9 MG/ML
5-250 INJECTION, SOLUTION INTRAVENOUS PRN
OUTPATIENT
Start: 2024-07-10

## 2024-07-10 RX ORDER — SODIUM CHLORIDE 0.9 % (FLUSH) 0.9 %
5-40 SYRINGE (ML) INJECTION PRN
OUTPATIENT
Start: 2024-07-10

## 2024-07-10 RX ORDER — FAMOTIDINE 10 MG/ML
20 INJECTION, SOLUTION INTRAVENOUS
OUTPATIENT
Start: 2024-07-10

## 2024-07-10 RX ORDER — EPINEPHRINE 1 MG/ML
0.3 INJECTION, SOLUTION, CONCENTRATE INTRAVENOUS PRN
OUTPATIENT
Start: 2024-07-10

## 2024-07-10 RX ORDER — ALBUTEROL SULFATE 90 UG/1
4 AEROSOL, METERED RESPIRATORY (INHALATION) PRN
OUTPATIENT
Start: 2024-07-10

## 2024-07-10 RX ORDER — SODIUM CHLORIDE 9 MG/ML
INJECTION, SOLUTION INTRAVENOUS CONTINUOUS
OUTPATIENT
Start: 2024-07-10

## 2024-07-10 RX ORDER — DIPHENHYDRAMINE HYDROCHLORIDE 50 MG/ML
50 INJECTION INTRAMUSCULAR; INTRAVENOUS
OUTPATIENT
Start: 2024-07-10

## 2024-07-10 RX ORDER — ONDANSETRON 2 MG/ML
8 INJECTION INTRAMUSCULAR; INTRAVENOUS
OUTPATIENT
Start: 2024-07-10

## 2024-07-10 RX ORDER — MEPERIDINE HYDROCHLORIDE 50 MG/ML
12.5 INJECTION INTRAMUSCULAR; INTRAVENOUS; SUBCUTANEOUS PRN
OUTPATIENT
Start: 2024-07-10

## 2024-07-10 RX ORDER — HEPARIN SODIUM (PORCINE) LOCK FLUSH IV SOLN 100 UNIT/ML 100 UNIT/ML
500 SOLUTION INTRAVENOUS PRN
OUTPATIENT
Start: 2024-07-10

## 2024-07-10 RX ORDER — ACETAMINOPHEN 325 MG/1
650 TABLET ORAL ONCE
OUTPATIENT
Start: 2024-07-10 | End: 2024-07-10

## 2024-07-18 NOTE — PATIENT INSTRUCTIONS
Dear Valued Patient,     Attached is a document that shows several convenient locations where laboratory and imaging services are offered.  Our team is happy to assist in choosing among these options or answer other questions you may have.  Thank you for trusting us with your care.    [facility] [place] [facility]    Good Help to Those in Need®     -----------------------------------------------------------------    Thank you for visiting [facility] [place]!      For future medication refills, we require updated lab results and an upcoming appointment to be in our system prior to refilling prescriptions.  Without these two things, your refill will be DENIED.  If you miss your upcoming appointment, your refill will also be DENIED.      We appreciate your understanding of this critical clinical aspect of our practice. -Dr. Watson & Renata, NP

## 2024-07-23 ENCOUNTER — TELEMEDICINE (OUTPATIENT)
Age: 74
End: 2024-07-23
Payer: MEDICARE

## 2024-07-23 DIAGNOSIS — D89.84 IGG4-RELATED SCLEROSING CHOLANGITIS (HCC): Primary | ICD-10-CM

## 2024-07-23 DIAGNOSIS — K75.4 AUTOIMMUNE HEPATITIS (HCC): ICD-10-CM

## 2024-07-23 DIAGNOSIS — K83.09 IGG4-RELATED SCLEROSING CHOLANGITIS (HCC): Primary | ICD-10-CM

## 2024-07-23 PROCEDURE — 99213 OFFICE O/P EST LOW 20 MIN: CPT | Performed by: PHYSICIAN ASSISTANT

## 2024-07-23 PROCEDURE — 3017F COLORECTAL CA SCREEN DOC REV: CPT | Performed by: PHYSICIAN ASSISTANT

## 2024-07-23 PROCEDURE — G8427 DOCREV CUR MEDS BY ELIG CLIN: HCPCS | Performed by: PHYSICIAN ASSISTANT

## 2024-07-23 PROCEDURE — 1123F ACP DISCUSS/DSCN MKR DOCD: CPT | Performed by: PHYSICIAN ASSISTANT

## 2024-07-23 NOTE — PATIENT INSTRUCTIONS
Ask your PCP for Hemoglobin A1C blood test to check for issues with diabetes.      Please get lab work done in the next week or two.   ------------------------------------------------------------------------  Dear Valued Patient,     Attached is a document that shows several convenient locations where laboratory and imaging services are offered.  Our team is happy to assist in choosing among these options or answer other questions you may have.  Thank you for trusting us with your care.    [facility] [place] [facility]    Good Help to Those in Need®       ---------------------------------------------------------------------------  Thank you for visiting [facility] [place]!      For future medication refills, we require updated lab results and an upcoming appointment to be in our system prior to refilling prescriptions.  Without these two things, your refill will be DENIED.  If you miss your upcoming appointment, your refill will also be DENIED.      We appreciate your understanding of this critical clinical aspect of our practice. -Dr. Watson & Renata, NP

## 2024-07-23 NOTE — PROGRESS NOTES
tablet 650 mg       Admin Date  07/24/2024 Action  Given Dose  650 mg Route  Oral Administered By  Pastora Briseno RN              diphenhydrAMINE (BENADRYL) injection 50 mg       Admin Date  07/24/2024 Action  Given Dose  50 mg Route  IntraVENous Administered By  Pastora Briseno RN              methylPREDNISolone sodium succ (SOLU-MEDROL) injection 125 mg       Admin Date  07/24/2024 Action  Given Dose  125 mg Route  IntraVENous Administered By  Pastora Briseno RN              riTUXimab-pvvr (RUXIENCE) 1,000 mg in sodium chloride 0.9 % 250 mL IVPB       Admin Date  07/24/2024 Action  New Bag Dose  1,000 mg Rate  25 mL/hr Route  IntraVENous Administered By  Pastora Briseno RN              sodium chloride flush 0.9 % injection 5-40 mL       Admin Date  07/24/2024 Action  Given Dose  10 mL Route  IntraVENous Administered By  Pastora Briseno RN                Ruxience 500mg vial x 2 (Total dose 1000mg, 0mg waste)  NDC 3014-1803-10    START: 25ml/hr @ 0915  50ml/hr @ 0945  75ml/hr @ 1015  100ml/hr @1045  STOP: 1221    Acetaminophen 325 x 2 (total dose 650)  NDC 7072-9973-53  Lot #        [id number]  Exp      9/26    Diphenhydramine 50mg vial  NDC  9247-2107-25  Lot # [id number]  Exp 11/2025    Solumedrol 125mg with 2ml Sterile Water  NDC  63531-911-03  &  5340-5031-26  Lot #  [id number]          &  [id number]  Exp  6/2025                & 01/26    Normal Saline Flush 10ml  NDC 8290-605263  Lot #    [id number]  Exp     12/31/26    250ml Normal Saline bag @ 20ml/hr  NDC  3461-4776-84  Lot #  [id number]  Exp    09/2025    Lines: 24G LEFT FA.   +Blood return.   Line flushed per protocol.    Access was removed from Mr. Dempsey after completion of infusion/injection.   Treatment tolerated without complaints or reactions.   Patient provided with AVS.  Patient discharged in stable condition at 1245.  Note routed to supervising provider for co-signing.     Future Appointments   Date Time Provider Department Center   1/22/2025  9:00 AM Richard Watson MD [facility]

## 2024-07-23 NOTE — PROGRESS NOTES
[facility]      Miguel Smallwood MD, FACP, FACG, FAASLD      SHELBI Smith-GREER Moralez, [facility]   Kim Rateddi, [facility]   Rebeca Mayen, FNP-C   Axel Mckenzie, FNP-C    Juliette Martinez, [facility]    at [facility]    [address], [address]    [place]  [address] [phone]    FAX: [phone]   [facility]    at [facility]    [address]    [place]  [address] [phone]    FAX: [phone]     Patient Care Team:  None, None as PCP - General    Patient Active Problem List   Diagnosis    IgG4-related sclerosing cholangitis (HCC)    Autoimmune hepatitis (HCC)    Retroperitoneal fibrosis    Autoimmune sclerosing pancreatitis (HCC)    IgG4 related disease (HCC)     Marcelina Dempsey, was evaluated through a synchronous (real-time) audio-video encounter. The patient (or guardian if applicable) is aware that this is a billable service, which includes applicable co-pays. This Virtual Visit was conducted with patient's (and/or legal guardian's) consent. Patient identification was verified, and a caregiver was present when appropriate.   The patient was located at Home: [address] [place] [address] [place] [address]  Provider was located at Facility (Appt Dept): [address]  [place] [address]  Confirm you are appropriately licensed, registered, or certified to deliver care in the state where the patient is located as indicated above. If you are not or unsure, please re-schedule the visit: Yes, I confirm.      Total time spent for this encounter: Not billed by time    --SHELBI Smith on 7/23/2024 at 4:36 PM    An electronic signature was used to authenticate this note.     Marcelina Dempsey returns to the Liver [facility] for management of IGG4

## 2024-07-24 ENCOUNTER — NURSE ONLY (OUTPATIENT)
Age: 74
End: 2024-07-24
Payer: MEDICARE

## 2024-07-24 ENCOUNTER — OFFICE VISIT (OUTPATIENT)
Age: 74
End: 2024-07-24
Payer: MEDICARE

## 2024-07-24 VITALS
TEMPERATURE: 97.9 F | BODY MASS INDEX: 24.94 KG/M2 | RESPIRATION RATE: 16 BRPM | OXYGEN SATURATION: 98 % | SYSTOLIC BLOOD PRESSURE: 116 MMHG | WEIGHT: 132 LBS | HEART RATE: 58 BPM | DIASTOLIC BLOOD PRESSURE: 72 MMHG

## 2024-07-24 VITALS
SYSTOLIC BLOOD PRESSURE: 139 MMHG | OXYGEN SATURATION: 97 % | RESPIRATION RATE: 14 BRPM | TEMPERATURE: 97.9 F | DIASTOLIC BLOOD PRESSURE: 83 MMHG | HEART RATE: 53 BPM

## 2024-07-24 DIAGNOSIS — D89.84 IGG4-RELATED SCLEROSING CHOLANGITIS (HCC): Primary | ICD-10-CM

## 2024-07-24 DIAGNOSIS — D89.84 IGG4 RELATED DISEASE (HCC): ICD-10-CM

## 2024-07-24 DIAGNOSIS — K83.09 IGG4-RELATED SCLEROSING CHOLANGITIS (HCC): Primary | ICD-10-CM

## 2024-07-24 PROCEDURE — 99214 OFFICE O/P EST MOD 30 MIN: CPT | Performed by: PEDIATRICS

## 2024-07-24 PROCEDURE — 96413 CHEMO IV INFUSION 1 HR: CPT | Performed by: PEDIATRICS

## 2024-07-24 PROCEDURE — 3017F COLORECTAL CA SCREEN DOC REV: CPT | Performed by: PEDIATRICS

## 2024-07-24 PROCEDURE — 96374 THER/PROPH/DIAG INJ IV PUSH: CPT | Performed by: PEDIATRICS

## 2024-07-24 PROCEDURE — PBSHW PBB SHADOW CHARGE: Performed by: PEDIATRICS

## 2024-07-24 PROCEDURE — G8428 CUR MEDS NOT DOCUMENT: HCPCS | Performed by: PEDIATRICS

## 2024-07-24 PROCEDURE — 96375 TX/PRO/DX INJ NEW DRUG ADDON: CPT | Performed by: PEDIATRICS

## 2024-07-24 PROCEDURE — 96415 CHEMO IV INFUSION ADDL HR: CPT | Performed by: PEDIATRICS

## 2024-07-24 PROCEDURE — 1123F ACP DISCUSS/DSCN MKR DOCD: CPT | Performed by: PEDIATRICS

## 2024-07-24 PROCEDURE — 1036F TOBACCO NON-USER: CPT | Performed by: PEDIATRICS

## 2024-07-24 PROCEDURE — G8420 CALC BMI NORM PARAMETERS: HCPCS | Performed by: PEDIATRICS

## 2024-07-24 RX ORDER — ACETAMINOPHEN 325 MG/1
650 TABLET ORAL ONCE
Status: COMPLETED | OUTPATIENT
Start: 2024-07-24 | End: 2024-07-24

## 2024-07-24 RX ORDER — ONDANSETRON 2 MG/ML
8 INJECTION INTRAMUSCULAR; INTRAVENOUS
OUTPATIENT
Start: 2025-01-05

## 2024-07-24 RX ORDER — DIPHENHYDRAMINE HYDROCHLORIDE 50 MG/ML
50 INJECTION INTRAMUSCULAR; INTRAVENOUS ONCE
OUTPATIENT
Start: 2025-01-05 | End: 2025-01-05

## 2024-07-24 RX ORDER — SODIUM CHLORIDE 0.9 % (FLUSH) 0.9 %
5-40 SYRINGE (ML) INJECTION PRN
Status: DISCONTINUED | OUTPATIENT
Start: 2024-07-24 | End: 2024-07-24 | Stop reason: HOSPADM

## 2024-07-24 RX ORDER — SODIUM CHLORIDE 0.9 % (FLUSH) 0.9 %
5-40 SYRINGE (ML) INJECTION PRN
OUTPATIENT
Start: 2025-01-05

## 2024-07-24 RX ORDER — SODIUM CHLORIDE 9 MG/ML
5-250 INJECTION, SOLUTION INTRAVENOUS PRN
OUTPATIENT
Start: 2025-01-05

## 2024-07-24 RX ORDER — ACETAMINOPHEN 325 MG/1
650 TABLET ORAL ONCE
OUTPATIENT
Start: 2025-01-05 | End: 2025-01-05

## 2024-07-24 RX ORDER — METHYLPREDNISOLONE SODIUM SUCCINATE 125 MG/2ML
125 INJECTION, POWDER, LYOPHILIZED, FOR SOLUTION INTRAMUSCULAR; INTRAVENOUS ONCE
Status: COMPLETED | OUTPATIENT
Start: 2024-07-24 | End: 2024-07-24

## 2024-07-24 RX ORDER — EPINEPHRINE 1 MG/ML
0.3 INJECTION, SOLUTION, CONCENTRATE INTRAVENOUS PRN
OUTPATIENT
Start: 2025-01-05

## 2024-07-24 RX ORDER — SODIUM CHLORIDE 9 MG/ML
5-250 INJECTION, SOLUTION INTRAVENOUS PRN
Status: DISCONTINUED | OUTPATIENT
Start: 2024-07-24 | End: 2024-07-24 | Stop reason: HOSPADM

## 2024-07-24 RX ORDER — DIPHENHYDRAMINE HYDROCHLORIDE 50 MG/ML
50 INJECTION INTRAMUSCULAR; INTRAVENOUS ONCE
Status: COMPLETED | OUTPATIENT
Start: 2024-07-24 | End: 2024-07-24

## 2024-07-24 RX ORDER — HEPARIN SODIUM (PORCINE) LOCK FLUSH IV SOLN 100 UNIT/ML 100 UNIT/ML
500 SOLUTION INTRAVENOUS PRN
OUTPATIENT
Start: 2025-01-05

## 2024-07-24 RX ORDER — MEPERIDINE HYDROCHLORIDE 50 MG/ML
12.5 INJECTION INTRAMUSCULAR; INTRAVENOUS; SUBCUTANEOUS PRN
OUTPATIENT
Start: 2025-01-05

## 2024-07-24 RX ORDER — FAMOTIDINE 10 MG/ML
20 INJECTION, SOLUTION INTRAVENOUS
OUTPATIENT
Start: 2025-01-05

## 2024-07-24 RX ORDER — ALBUTEROL SULFATE 90 UG/1
4 AEROSOL, METERED RESPIRATORY (INHALATION) PRN
OUTPATIENT
Start: 2025-01-05

## 2024-07-24 RX ORDER — SODIUM CHLORIDE 9 MG/ML
INJECTION, SOLUTION INTRAVENOUS CONTINUOUS
OUTPATIENT
Start: 2025-01-05

## 2024-07-24 RX ORDER — ACETAMINOPHEN 325 MG/1
650 TABLET ORAL
OUTPATIENT
Start: 2025-01-05

## 2024-07-24 RX ORDER — DIPHENHYDRAMINE HYDROCHLORIDE 50 MG/ML
50 INJECTION INTRAMUSCULAR; INTRAVENOUS
OUTPATIENT
Start: 2025-01-05

## 2024-07-24 RX ADMIN — DIPHENHYDRAMINE HYDROCHLORIDE 50 MG: 50 INJECTION INTRAMUSCULAR; INTRAVENOUS at 09:06

## 2024-07-24 RX ADMIN — SODIUM CHLORIDE 1000 MG: 900 INJECTION, SOLUTION INTRAVENOUS at 09:15

## 2024-07-24 RX ADMIN — SODIUM CHLORIDE 20 ML/HR: 9 INJECTION, SOLUTION INTRAVENOUS at 09:06

## 2024-07-24 RX ADMIN — Medication 10 ML: at 09:05

## 2024-07-24 RX ADMIN — METHYLPREDNISOLONE SODIUM SUCCINATE 125 MG: 125 INJECTION INTRAMUSCULAR; INTRAVENOUS at 09:07

## 2024-07-24 RX ADMIN — ACETAMINOPHEN 650 MG: 325 TABLET ORAL at 08:49

## 2024-07-24 NOTE — PROGRESS NOTES
Chief Complaint   Patient presents with    Follow-up     IgG4-related Sclerosing Cholangitis     1. Have you been to the ER, urgent care clinic since your last visit?  Hospitalized since your last visit?No    2. Have you seen or consulted any other health care providers outside of the [facility] since your last visit?  Include any pap smears or colon screening. No    
retroperitoneal mass encases the right common iliac artery and right ureter; its largest component measures 2.8 x 2.6 cm, previously 2.7 x 2.7 cm. Subcentimeter retroperitoneal lymph nodes are not significantly changed. REPRODUCTIVE ORGANS: Unremarkable URINARY BLADDER: No mass or calculus. BONES: No destructive bone lesion.ABDOMINAL WALL: No mass or hernia.    PET/CT Scan 4/27/2021: HEAD/NECK: There is a 1 cm supraclavicular lymph node with SUV of 3.2. CHEST: There are normal sized mediastinal and hilar lymph nodes with slight increased metabolic activity of 2.8. ABDOMEN/PELVIS: Right ureteral stent is noted in position. There is increased soft tissue density at the aortic bifurcation with slight increased metabolic activity of 4. SKELETON: No foci of abnormal hypermetabolism in the axial and visualized appendicular skeleton.    CT Abdomen and Pelvis with contrast 10/15/2015: Visualized lower Chest: Lungs/Pleura: Within normal limits Heart/vessels: Normal. Abdomen and pelvis: Liver: Normal. Biliary: Post cholecystectomy. There is new intrahepatic severe biliary dilation with ducts converging at the level of the hilum where the lumen becomes completely collapse. No clearly evident mass is identified however there is suggestion of some enhancement of the duct because of the common hepatic duct just as it exits the area of collapse. Distally the common duct distends to approximately 9 mm to 10 taper within the pancreatic head at the site of previous stricture identified on MRI within the pancreatic head. Spleen: Normal. Pancreas: Normal.  No duct dilation. Adrenals: Normal. Urinary: Normal appearance to kidneys, ureters, and bladder. Peritoneum/Mesenteries: Normal. Gastrointestinal tract: Normal.  No dilation or inflammatory changes. Vascular: Atherosclerotic calcination is without aneurysm. Reproductive System: Seminal vesicles and prostate appear unremarkable.  MSK: Spondylosis with no aggressive lesions of

## 2024-11-25 ENCOUNTER — TELEPHONE (OUTPATIENT)
Age: 74
End: 2024-11-25

## 2024-11-25 NOTE — TELEPHONE ENCOUNTER
1st attempt Reached out to patient left voice message on 11/25/2024 for patient to call back into the office. Patient has an upcoming appt on 1/22/2025 that needs to be cancel due to the physician leaving the ministry at the end of the year 2024. Pt would have to reach out to listing that was provided on the letter. sdh

## 2024-12-09 ENCOUNTER — TELEPHONE (OUTPATIENT)
Age: 74
End: 2024-12-09

## 2024-12-09 NOTE — TELEPHONE ENCOUNTER
Reached out to PT with interpretor to inform that upcoming appt would need to be cancelled due to  leaving on 12/31/24. LVM requesting call back and provided office number.

## 2024-12-16 ENCOUNTER — TELEPHONE (OUTPATIENT)
Age: 74
End: 2024-12-16

## 2024-12-16 NOTE — TELEPHONE ENCOUNTER
----- Message from Dr. Richard Watson MD sent at 12/12/2024  3:20 PM EST -----  yes  ----- Message -----  From: Pastora Briseno RN  Sent: 12/12/2024   9:22 AM EST  To: Richard Watson MD    Has infusions scheduled in January. OK to proceed until he finds a new rheumatologist?

## 2025-01-08 ENCOUNTER — HOSPITAL ENCOUNTER (OUTPATIENT)
Facility: HOSPITAL | Age: 75
Discharge: HOME | End: 2025-01-11
Attending: PEDIATRICS
Payer: MEDICARE

## 2025-01-08 DIAGNOSIS — K83.09 IGG4-RELATED SCLEROSING CHOLANGITIS (HCC): ICD-10-CM

## 2025-01-08 DIAGNOSIS — D89.84 IGG4-RELATED SCLEROSING CHOLANGITIS (HCC): ICD-10-CM

## 2025-01-08 LAB — CREAT BLD-MCNC: 1.2 MG/DL (ref 0.6–1.3)

## 2025-01-08 PROCEDURE — 82565 ASSAY OF CREATININE: CPT

## 2025-01-08 PROCEDURE — 6360000004 HC RX CONTRAST MEDICATION: Performed by: RADIOLOGY

## 2025-01-08 PROCEDURE — 74177 CT ABD & PELVIS W/CONTRAST: CPT

## 2025-01-08 RX ORDER — IOPAMIDOL 755 MG/ML
100 INJECTION, SOLUTION INTRAVASCULAR
Status: COMPLETED | OUTPATIENT
Start: 2025-01-08 | End: 2025-01-08

## 2025-01-08 RX ADMIN — IOPAMIDOL 100 ML: 755 INJECTION, SOLUTION INTRAVENOUS at 10:21

## 2025-01-09 DIAGNOSIS — K83.09 IGG4-RELATED SCLEROSING CHOLANGITIS (HCC): Primary | ICD-10-CM

## 2025-01-09 DIAGNOSIS — D89.84 IGG4-RELATED SCLEROSING CHOLANGITIS (HCC): Primary | ICD-10-CM

## 2025-01-10 ENCOUNTER — TELEPHONE (OUTPATIENT)
Age: 75
End: 2025-01-10

## 2025-01-10 NOTE — TELEPHONE ENCOUNTER
----- Message from Dr. Richard Watson MD sent at 1/9/2025  6:31 PM EST -----  Please let them know the CT scan looked great. There is no residual fibrosis. I recommend stopping the infusions and checking a CT in 4 months. I will order the CT but they will have to have their new rheumatologist follow up on the results. This CT in 4 months is to make sure there is no inflammation returning.

## 2025-01-10 NOTE — TELEPHONE ENCOUNTER
Left voicemail using the  to have pt give the office a return call regarding Dr Watson message below   Please let them know the CT scan looked great. There is no residual fibrosis. I recommend stopping the infusions and checking a CT in 4 months. I will order the CT but they will have to have their new rheumatologist follow up on the results. This CT in 4 months is to make sure there is no inflammation returning.

## 2025-01-13 ENCOUNTER — TELEPHONE (OUTPATIENT)
Age: 75
End: 2025-01-13

## 2025-01-13 NOTE — TELEPHONE ENCOUNTER
PT called with son as interpretor. PT stated that he was confused on the voice mail he received and why his infusions were cancelled. Informed I wasn't sure and a message could be sent and he stated he understood. PT requesting call back

## 2025-01-13 NOTE — TELEPHONE ENCOUNTER
Spoke to pt son informed pt son per Dr Watson message below  Please let them know the CT scan looked great. There is no residual fibrosis. I recommend stopping the infusions and checking a CT in 4 months. I will order the CT but they will have to have their new rheumatologist follow up on the results. This CT in 4 months is to make sure there is no inflammation returning.   Pt son verbally acknowledged understanding and stated that he will contact his fathers insurance company to get a local rheumatologist to get his father established

## 2025-01-29 ENCOUNTER — TELEMEDICINE (OUTPATIENT)
Age: 75
End: 2025-01-29
Payer: MEDICARE

## 2025-01-29 DIAGNOSIS — K75.4 AUTOIMMUNE HEPATITIS (HCC): Primary | ICD-10-CM

## 2025-01-29 DIAGNOSIS — D89.84 IGG4-RELATED SCLEROSING CHOLANGITIS (HCC): ICD-10-CM

## 2025-01-29 DIAGNOSIS — K83.09 IGG4-RELATED SCLEROSING CHOLANGITIS (HCC): ICD-10-CM

## 2025-01-29 PROCEDURE — 1123F ACP DISCUSS/DSCN MKR DOCD: CPT | Performed by: PHYSICIAN ASSISTANT

## 2025-01-29 PROCEDURE — 1159F MED LIST DOCD IN RCRD: CPT | Performed by: PHYSICIAN ASSISTANT

## 2025-01-29 PROCEDURE — 1160F RVW MEDS BY RX/DR IN RCRD: CPT | Performed by: PHYSICIAN ASSISTANT

## 2025-01-29 PROCEDURE — G8427 DOCREV CUR MEDS BY ELIG CLIN: HCPCS | Performed by: PHYSICIAN ASSISTANT

## 2025-01-29 PROCEDURE — 3017F COLORECTAL CA SCREEN DOC REV: CPT | Performed by: PHYSICIAN ASSISTANT

## 2025-01-29 PROCEDURE — 99213 OFFICE O/P EST LOW 20 MIN: CPT | Performed by: PHYSICIAN ASSISTANT

## 2025-01-29 ASSESSMENT — ANXIETY QUESTIONNAIRES
3. WORRYING TOO MUCH ABOUT DIFFERENT THINGS: NOT AT ALL
2. NOT BEING ABLE TO STOP OR CONTROL WORRYING: NOT AT ALL
6. BECOMING EASILY ANNOYED OR IRRITABLE: NOT AT ALL
GAD7 TOTAL SCORE: 0
1. FEELING NERVOUS, ANXIOUS, OR ON EDGE: NOT AT ALL
IF YOU CHECKED OFF ANY PROBLEMS ON THIS QUESTIONNAIRE, HOW DIFFICULT HAVE THESE PROBLEMS MADE IT FOR YOU TO DO YOUR WORK, TAKE CARE OF THINGS AT HOME, OR GET ALONG WITH OTHER PEOPLE: NOT DIFFICULT AT ALL
4. TROUBLE RELAXING: NOT AT ALL
7. FEELING AFRAID AS IF SOMETHING AWFUL MIGHT HAPPEN: NOT AT ALL
5. BEING SO RESTLESS THAT IT IS HARD TO SIT STILL: NOT AT ALL

## 2025-01-29 ASSESSMENT — PATIENT HEALTH QUESTIONNAIRE - PHQ9
SUM OF ALL RESPONSES TO PHQ QUESTIONS 1-9: 0
2. FEELING DOWN, DEPRESSED OR HOPELESS: NOT AT ALL
SUM OF ALL RESPONSES TO PHQ QUESTIONS 1-9: 0
SUM OF ALL RESPONSES TO PHQ9 QUESTIONS 1 & 2: 0
1. LITTLE INTEREST OR PLEASURE IN DOING THINGS: NOT AT ALL
DEPRESSION UNABLE TO ASSESS: FUNCTIONAL CAPACITY MOTIVATION LIMITS ACCURACY

## 2025-01-29 NOTE — PROGRESS NOTES
Chief Complaint   Patient presents with    Follow-up     N/A     There were no vitals filed for this visit.  .  \"Have you been to the ER, urgent care clinic since your last visit?  Hospitalized since your last visit?\"    NO    “Have you seen or consulted any other health care providers outside of [facility] since your last visit?”    NO        “Have you had a colorectal cancer screening such as a colonoscopy/FIT/Cologuard?    YES - Type: Colonoscopy - Where: last year  Nurse/CMA to request most recent records if not in the chart     No colonoscopy on file  No cologuard on file  No FIT/FOBT on file   No flexible sigmoidoscopy on file         Click Here for Release of Records Request    
[facility]

## 2025-04-21 RX ORDER — OMEPRAZOLE 20 MG/1
20 CAPSULE, DELAYED RELEASE ORAL DAILY
Qty: 90 CAPSULE | Refills: 1 | Status: SHIPPED | OUTPATIENT
Start: 2025-04-21

## (undated) DEVICE — KIT COLON W/ 1.1OZ LUB AND 2 END

## (undated) DEVICE — DEVON™ KNEE AND BODY STRAP 60" X 3" (1.5 M X 7.6 CM): Brand: DEVON

## (undated) DEVICE — Device: Brand: SINGLE USE SOFT BRUSH

## (undated) DEVICE — ENDO CARRY-ON PROCEDURE KIT INCLUDES ENZYMATIC SPONGE, GAUZE, BIOHAZARD LABEL, TRAY, LUBRICANT, DIRTY SCOPE LABEL, WATER LABEL, TRAY, DRAWSTRING PAD, AND DEFENDO 4-PIECE KIT.: Brand: ENDO CARRY-ON PROCEDURE KIT

## (undated) DEVICE — SOLIDIFIER FLUID 3000 CC ABSORB

## (undated) DEVICE — CANN NASAL O2 CAPNOGRAPHY AD -- FILTERLINE

## (undated) DEVICE — ACCESS AND DELIVERY CATHETER: Brand: SPYSCOPE™ DS II

## (undated) DEVICE — FORCEPS BX L240CM JAW DIA2.8MM L CAP W/ NDL MIC MESH TOOTH

## (undated) DEVICE — PREP SKN CHLRAPRP SNGL 1.75ML --

## (undated) DEVICE — BAG BELONG PT PERS CLEAR HANDL

## (undated) DEVICE — CONTAINER SPEC 20 ML LID NEUT BUFF FORMALIN 10 % POLYPR STS

## (undated) DEVICE — SET EXTN TBNG L BOR 4 W STPCOCK ST 32IN PRIMING VOL 6ML

## (undated) DEVICE — BAG SPEC BIOHZD LF 2MIL 6X10IN -- CONVERT TO ITEM 357326

## (undated) DEVICE — MEDI-VAC NON-CONDUCTIVE SUCTION TUBING: Brand: CARDINAL HEALTH

## (undated) DEVICE — NDL FLTR TIP 5 MIC 18GX1.5IN --

## (undated) DEVICE — SPHINCTEROTOME: Brand: DREAMTOME™ RX 44

## (undated) DEVICE — SNARE ENDOSCP M L240CM W27MM SHTH DIA2.4MM CHN 2.8MM OVL

## (undated) DEVICE — BITEBLOCK ENDOSCP 60FR MAXI WHT POLYETH STURDY W/ VELC WVN

## (undated) DEVICE — SYR LR LCK 1ML GRAD NSAF 30ML --

## (undated) DEVICE — KENDALL RADIOLUCENT FOAM MONITORING ELECTRODE -RECTANGULAR SHAPE: Brand: KENDALL

## (undated) DEVICE — CATH IV AUTOGRD BC BLU 22GA 25 -- INSYTE

## (undated) DEVICE — NDL PRT INJ NSAF BLNT 18GX1.5 --

## (undated) DEVICE — Z DISCONTINUED NO SUB IDED SET EXTN W/ 4 W STPCOCK M SPIN LOK 36IN

## (undated) DEVICE — BW-412T DISP COMBO CLEANING BRUSH: Brand: SINGLE USE COMBINATION CLEANING BRUSH

## (undated) DEVICE — RETRIEVAL BALLOON CATHETER: Brand: EXTRACTOR™ PRO RX

## (undated) DEVICE — 1200 GUARD II KIT W/5MM TUBE W/O VAC TUBE: Brand: GUARDIAN

## (undated) DEVICE — TRNQT TEXT 1X18IN BLU LF DISP -- CONVERT TO ITEM 362165

## (undated) DEVICE — DEVICE LCK BILI RAP EXCHG OLPS --

## (undated) DEVICE — SET ADMIN 16ML TBNG L100IN 2 Y INJ SITE IV PIGGY BK DISP

## (undated) DEVICE — SYR 5ML 1/5 GRAD LL NSAF LF --

## (undated) DEVICE — WIREGUIDED CYTOLOGY BRUSH: Brand: RX CYTOLOGY BRUSH

## (undated) DEVICE — DIRECT VISUALIZATION PROBE: Brand: SPYGLASS

## (undated) DEVICE — CUFF BLD PRSS CHILD SZ 9 FOR 15-21CM LIMB VYN SFT W/O TB

## (undated) DEVICE — SYR 3ML LL TIP 1/10ML GRAD --

## (undated) DEVICE — SINGLE-USE BIOPSY FORCEPS: Brand: SPYBITE MAX

## (undated) DEVICE — NON-REM POLYHESIVE PATIENT RETURN ELECTRODE: Brand: VALLEYLAB

## (undated) DEVICE — Device